# Patient Record
Sex: MALE | Race: BLACK OR AFRICAN AMERICAN | Employment: FULL TIME | URBAN - METROPOLITAN AREA
[De-identification: names, ages, dates, MRNs, and addresses within clinical notes are randomized per-mention and may not be internally consistent; named-entity substitution may affect disease eponyms.]

---

## 2017-01-16 RX ORDER — SILDENAFIL CITRATE 100 MG/1
TABLET, FILM COATED ORAL
Qty: 9 TAB | Refills: 0 | Status: SHIPPED | OUTPATIENT
Start: 2017-01-16 | End: 2017-02-17 | Stop reason: SDUPTHER

## 2017-02-19 RX ORDER — SILDENAFIL CITRATE 100 MG/1
TABLET, FILM COATED ORAL
Qty: 9 TAB | Refills: 0 | Status: SHIPPED | OUTPATIENT
Start: 2017-02-19 | End: 2017-09-14 | Stop reason: SDUPTHER

## 2017-06-06 ENCOUNTER — OFFICE VISIT (OUTPATIENT)
Dept: INTERNAL MEDICINE CLINIC | Age: 47
End: 2017-06-06

## 2017-06-06 VITALS
BODY MASS INDEX: 39.17 KG/M2 | HEIGHT: 75 IN | RESPIRATION RATE: 18 BRPM | SYSTOLIC BLOOD PRESSURE: 127 MMHG | DIASTOLIC BLOOD PRESSURE: 82 MMHG | WEIGHT: 315 LBS | OXYGEN SATURATION: 96 % | HEART RATE: 83 BPM | TEMPERATURE: 97.3 F

## 2017-06-06 DIAGNOSIS — E78.5 HYPERLIPIDEMIA, UNSPECIFIED HYPERLIPIDEMIA TYPE: ICD-10-CM

## 2017-06-06 DIAGNOSIS — G47.30 SLEEP APNEA, UNSPECIFIED TYPE: ICD-10-CM

## 2017-06-06 DIAGNOSIS — Z79.4 UNCONTROLLED TYPE 2 DIABETES MELLITUS WITH HYPERGLYCEMIA, WITH LONG-TERM CURRENT USE OF INSULIN (HCC): Primary | ICD-10-CM

## 2017-06-06 DIAGNOSIS — E11.65 UNCONTROLLED TYPE 2 DIABETES MELLITUS WITH HYPERGLYCEMIA, WITH LONG-TERM CURRENT USE OF INSULIN (HCC): Primary | ICD-10-CM

## 2017-06-06 LAB
ALBUMIN UR QL STRIP: 150 MG/L
CHOLEST SERPL-MCNC: 200 MG/DL
CREATININE, URINE POC: 300 MG/DL
GLUCOSE POC: 188 MG/DL
HBA1C MFR BLD HPLC: 11.3 %
HDLC SERPL-MCNC: 47 MG/DL
LDL CHOLESTEROL POC: 132
MICROALBUMIN/CREAT RATIO POC: NORMAL MG/G
NON-HDL GOAL (POC): 153
TCHOL/HDL RATIO (POC): 2.8
TRIGL SERPL-MCNC: 109 MG/DL

## 2017-06-06 RX ORDER — ROSUVASTATIN CALCIUM 40 MG/1
40 TABLET, COATED ORAL
Qty: 30 TAB | Refills: 11 | Status: SHIPPED | OUTPATIENT
Start: 2017-06-06 | End: 2017-09-11 | Stop reason: SDUPTHER

## 2017-06-06 NOTE — PROGRESS NOTES
Subjective: (As above and below)     Chief Complaint   Patient presents with    Complete Physical    Foot Swelling     At night while sleeping     Moy Rojas is a 52y.o. year old male who presents for annual exam and DM2 check up. Diabetic Review of Systems - medication compliance: noncompliant much of the time, diabetic diet compliance: noncompliant much of the time, home glucose monitoring: is performed sporadically, last eye exam approximately 1 ago. He states that he checks his blood sugar occasionally and it is in the 120's. He admits that he has NOT been taking his insulin. He states that he takes the levemir approx every other day and takes the novolog twice weekly. He admits that he frequently snacks - his fiance buys the groceries and there is always junk food around. He is not physically active. He works at [employer] and is around a lot of snacks at work as well. He continues to smoke. Last eye exam approx 1 year ago. Reviewed PmHx, RxHx, FmHx, SocHx, AllgHx and updated in chart.   Family History   Problem Relation Age of Onset    Diabetes Mother      cancer, liver? hep c    Hypertension Mother     Hypertension Father     Diabetes Brother     Hypertension Brother     Hypertension Maternal Uncle     Diabetes Maternal Grandmother     Diabetes Paternal Grandmother     Hypertension Maternal Uncle     Hypertension Maternal Uncle     Diabetes Paternal Uncle        Past Medical History:   Diagnosis Date    Asthma     Diabetes ([address]     HTN (hypertension) 3/30/2010    Hypertension     Other and unspecified hyperlipidemia 3/30/2010    Sleep apnea     Type II or unspecified type diabetes mellitus without mention of complication, uncontrolled 3/30/2010      Social History     Social History    Marital status: SINGLE     Spouse name: N/A    Number of children: N/A    Years of education: N/A     Social History Main Topics    Smoking status: Current Some Day Smoker Packs/day: 1.00     Years: 15.00     Types: Cigars    Smokeless tobacco: Never Used      Comment: black and mild    Alcohol use 6.0 oz/week     10 Standard drinks or equivalent per week      Comment: rare    Drug use: No    Sexual activity: Yes     Partners: Female     Birth control/ protection: Condom     Other Topics Concern    None     Social History Narrative    6/6/17: works at [employer]          Current Outpatient Prescriptions   Medication Sig    rosuvastatin (CRESTOR) 40 mg tablet Take 1 Tab by mouth nightly.  VIAGRA 100 mg tablet TAKE 1 TABLET BY MOUTH AS NEEDED FOR SEX AS DIRECTED    albuterol (PROVENTIL HFA, VENTOLIN HFA, PROAIR HFA) 90 mcg/actuation inhaler Take 1-2 Puffs by inhalation every four (4) hours as needed for Wheezing.  enalapril-hydrochlorthiazide (VASERETIC) 10-25 mg tablet Take 2 Tabs by mouth daily.  insulin detemir (LEVEMIR FLEXTOUCH) 100 unit/mL (3 mL) inpn Inject 65 units once daily.  sitaGLIPtin-metFORMIN (JANUMET) 50-1,000 mg per tablet Take 1 Tab by mouth two (2) times daily (with meals).  insulin aspart (NOVOLOG) 100 unit/mL inpn 15 units before meals  Indications: TYPE 2 DIABETES MELLITUS    Insulin Needles, Disposable, 31 gauge x 5/16\" ndle Use with insulin 4 times a day    aspirin delayed-release 81 mg tablet Take 1 Tab by mouth daily.  amLODIPine (NORVASC) 5 mg tablet Take 1 Tab by mouth daily. No current facility-administered medications for this visit. Review of Systems:   Constitutional:    Negative for fever and chills, negative diaphoresis. HEENT:              Negative for neck pain and stiffness. Eyes:                  Negative for visual disturbance, itching, redness or discharge. Respiratory:        Negative for cough and shortness of breath. Cardiovascular:  Negative for chest pain and palpitations. Gastrointestinal: Negative for nausea, vomiting, abdominal pain, diarrhea or constipation.   Genitourinary:     Negative for dysuria and frequency. Musculoskeletal: Negative for falls, tenderness and swelling. Skin:                    Negative for rash, masses or lesions. Neurological:       Negative for dizzyness, seizure, loss of consciousness, weakness and numbness.      Objective:     Vitals:    06/06/17 0817   BP: 127/82   Pulse: 83   Resp: 18   Temp: 97.3 °F (36.3 °C)   TempSrc: Oral   SpO2: 96%   Weight: (!) 350 lb 14.4 oz (159.2 kg)   Height: 6' 3\" (1.905 m)       Results for orders placed or performed in visit on 06/06/17   AMB POC GLUCOSE BLOOD, BY GLUCOSE MONITORING DEVICE   Result Value Ref Range    Glucose  mg/dL   AMB POC HEMOGLOBIN A1C   Result Value Ref Range    Hemoglobin A1c (POC) 11.3 %   AMB POC URINE, MICROALBUMIN, SEMIQUANT (3 RESULTS)   Result Value Ref Range    ALBUMIN, URINE  Negative mg/L    CREATININE, URINE  mg/dL    Microalbumin/creat ratio (POC) 30-300mg/g mg/g   AMB POC LIPID PROFILE   Result Value Ref Range    Cholesterol (POC) 200     Triglycerides (POC) 109     HDL Cholesterol (POC) 47     LDL Cholesterol (POC) 132     Non-HDL Goal (POC) 153     TChol/HDL Ratio (POC) 2.8          Physical Examination: General appearance - alert, well appearing, and in no distress  Mental status - alert, oriented to person, place, and time  Ears - bilateral TM's and external ear canals normal  Neck - supple, no significant adenopathy  Chest - clear to auscultation, no wheezes, rales or rhonchi, symmetric air entry  Heart - normal rate, regular rhythm, normal S1, S2, no murmurs, rubs, clicks or gallops  Extremities - peripheral pulses normal, no pedal edema, no clubbing or cyanosis      Diabetic foot exam:     Left:    Vibratory sensation normal    Proprioception normal   Sharp/dull discrimination normal    Filament test normal sensation with micro filament   Pulse DP: 2+ (normal)   Pulse PT: 2+ (normal)   Deformities: dry skin  Right:    Vibratory sensation normal   Proprioception normal   Sharp/dull discrimination normal   Filament test normal sensation with micro filament   Pulse DP: 2+ (normal)   Pulse PT: 2+ (normal)   Deformities: Mild - dry skin    Assessment/ Plan:   Follow-up Disposition:  Return in about 3 months (around 9/6/2017). NP student reviewed foot care basics with patient. Ting Jones discussion about need to take meds and improve lifestyle - discussed complications of DM2. [facility] endo, DM ed    1. Uncontrolled type 2 diabetes mellitus with hyperglycemia, with long-term current use of insulin ([facility])    - AMB POC GLUCOSE BLOOD, BY GLUCOSE MONITORING DEVICE  - AMB POC HEMOGLOBIN A1C  - AMB POC URINE, MICROALBUMIN, SEMIQUANT (3 RESULTS)  - AMB POC LIPID PROFILE  -  DIABETES FOOT EXAM  - REFERRAL TO DIABETIC EDUCATION  - REFERRAL TO ENDOCRINOLOGY  - METABOLIC PANEL, BASIC    2. Hyperlipidemia, unspecified hyperlipidemia type  increase  - rosuvastatin (CRESTOR) 40 mg tablet; Take 1 Tab by mouth nightly. Dispense: 30 Tab; Refill: 11    3. Sleep apnea, unspecified type  Routine follow up  - REFERRAL TO SLEEP STUDIES        I have discussed the diagnosis with the patient and the intended plan as seen in the above orders. The patient has received an after-visit summary and questions were answered concerning future plans. Pt conveyed understanding of plan. Medication Side Effects and Warnings were discussed with patient: yes  Patient Labs were reviewed: yes  Patient Past Records were reviewed:  yes    Aida Jung.  Diana Officer, NP

## 2017-06-06 NOTE — MR AVS SNAPSHOT
Visit Information Date & Time Provider Department Dept. Phone Encounter #  
 6/6/2017  8:15 AM Lesa Remy, [address] [id number] Follow-up Instructions Return in about 3 months (around 9/6/2017). Upcoming Health Maintenance Date Due  
 FOOT EXAM Q1 5/6/1980 Pneumococcal 19-64 Medium Risk (1 of 1 - PPSV23) 5/6/1989 DTaP/Tdap/Td series (1 - Tdap) 5/6/1991 MICROALBUMIN Q1 3/5/2015 EYE EXAM RETINAL OR DILATED Q1 2/10/2016 HEMOGLOBIN A1C Q6M 12/15/2016 LIPID PANEL Q1 6/15/2017 INFLUENZA AGE 9 TO ADULT 8/1/2017 Allergies as of 6/6/2017  Review Complete On: 6/6/2017 By: Keyanna Goel LPN Severity Noted Reaction Type Reactions Diazepam  08/17/2016    Other (comments) Caused hallucinations, paranoia Shellfish Derived  05/10/2015    Angioedema Current Immunizations  Never Reviewed No immunizations on file. Not reviewed this visit You Were Diagnosed With   
  
 Codes Comments Uncontrolled type 2 diabetes mellitus with hyperglycemia, with long-term current use of insulin (HCC)    -  Primary ICD-10-CM: E11.65, Z79.4 ICD-9-CM: 250.02, V58.67 Hyperlipidemia, unspecified hyperlipidemia type     ICD-10-CM: E78.5 ICD-9-CM: 272.4 Sleep apnea, unspecified type     ICD-10-CM: G47.30 ICD-9-CM: 780.57 Vitals BP Pulse Temp Resp Height(growth percentile) Weight(growth percentile) 127/82 (BP 1 Location: Right arm, BP Patient Position: Sitting) 83 97.3 °F (36.3 °C) (Oral) 18 6' 3\" (1.905 m) (!) 350 lb 14.4 oz (159.2 kg) SpO2 BMI Smoking Status 96% 43.86 kg/m2 Current Some Day Smoker BMI and BSA Data Body Mass Index Body Surface Area  
 43.86 kg/m 2 2.9 m 2 Preferred Pharmacy Pharmacy Name Phone [facility] [address] [employer] [address] [place] [address]  [place] [phone] Your Updated Medication List  
  
   
This list is accurate as of: 6/6/17  9:05 AM.  Always use your most recent med list.  
  
  
  
  
 albuterol 90 mcg/actuation inhaler Commonly known as:  PROVENTIL HFA, VENTOLIN HFA, PROAIR HFA Take 1-2 Puffs by inhalation every four (4) hours as needed for Wheezing. amLODIPine 5 mg tablet Commonly known as:  [place]radle Take 1 Tab by mouth daily. aspirin delayed-release 81 mg tablet Take 1 Tab by mouth daily. enalapril-hydroCHLOROthiazide 10-25 mg tablet Commonly known as:  Espinoza Chiquito Take 2 Tabs by mouth daily. insulin aspart 100 unit/mL Inpn Commonly known as:  Kenyatta Fus 15 units before meals  Indications: TYPE 2 DIABETES MELLITUS  
  
 insulin detemir 100 unit/mL (3 mL) Inpn Commonly known as:  Cruzito Caballero Inject 65 units once daily. Insulin Needles (Disposable) 31 gauge x 5/16\" Ndle Use with insulin 4 times a day  
  
 rosuvastatin 40 mg tablet Commonly known as:  CRESTOR Take 1 Tab by mouth nightly. SITagliptin-metFORMIN 50-1,000 mg per tablet Commonly known as:  Sherol Yulissa Take 1 Tab by mouth two (2) times daily (with meals). VIAGRA 100 mg tablet Generic drug:  sildenafil citrate TAKE 1 TABLET BY MOUTH AS NEEDED FOR SEX AS DIRECTED Prescriptions Sent to Pharmacy Refills  
 rosuvastatin (CRESTOR) 40 mg tablet 11 Sig: Take 1 Tab by mouth nightly. Class: Normal  
 Pharmacy: [facility] [address] Ph #: [phone] Route: Oral  
  
We Performed the Following AMB POC GLUCOSE BLOOD, BY GLUCOSE MONITORING DEVICE [75723 CPT(R)] AMB POC HEMOGLOBIN A1C [28634 CPT(R)] AMB POC LIPID PROFILE [34231 CPT(R)] AMB POC URINE, MICROALBUMIN, SEMIQUANT (3 RESULTS) [69731 CPT(R)]  DIABETES FOOT EXAM [7 Custom] METABOLIC PANEL, BASIC [40907 CPT(R)] REFERRAL TO DIABETIC EDUCATION [REF20 Custom] Comments:  
 Please evaluate patient for DM2 Please schedule and authorize patient for services monthly REFERRAL TO ENDOCRINOLOGY [WWG82 Custom] REFERRAL TO SLEEP STUDIES [REF99 Custom] Comments:  
 Please evaluate patient for general follow up for sleep apnea Follow-up Instructions Return in about 3 months (around 9/6/2017). Referral Information Referral ID Referred By Referred To [id number] Torsten GIMENEZ Not Available Visits Status Start Date End Date 1 New Request 6/6/17 6/6/18 If your referral has a status of pending review or denied, additional information will be sent to support the outcome of this decision. Referral ID Referred By Referred To [id number] Torsten Tanner MD  
   [address] [place] [address] [place] [address] Phone: [phone] Fax: [phone] Visits Status Start Date End Date 1 New Request 6/6/17 6/6/18 If your referral has a status of pending review or denied, additional information will be sent to support the outcome of this decision. Referral ID Referred By Referred To [id number] Torsten Lowe [address] [address] [address] Phone: [phone] Fax: [phone] Visits Status Start Date End Date 1 New Request 6/6/17 6/6/18 If your referral has a status of pending review or denied, additional information will be sent to support the outcome of this decision. Patient Instructions 1. You diabetes is very poorly controlled - you MUST take your medication as prescribed - your kidneys are already showing some changes from diabetes 2. At this point, I would like you to set up with an endocrinologist - a diabetes specialist - please call to make this appointment. 3. If you are due for you eye exam, please schedule this 4. The diabetic education class will be calling you - it would be nice if you can arrange to have some classes with them 5. Follow up with sleep studies for sleep apnea 6. Your blood pressure is great today! 7. We are going to increase your cholesterol medication crestor to 40mg nightly - the refills will be 40mg tabs but you can take 2 of the 20mg tabs until you run out Learning About Meal Planning for Diabetes Why plan your meals? Meal planning can be a key part of managing diabetes. Planning meals and snacks with the right balance of carbohydrate, protein, and fat can help you keep your blood sugar at the target level you set with your doctor. You don't have to eat special foods. You can eat what your family eats, including sweets once in a while. But you do have to pay attention to how often you eat and how much you eat of certain foods. You may want to work with a dietitian or a certified diabetes educator. He or she can give you tips and meal ideas and can answer your questions about meal planning. This health professional can also help you reach a healthy weight if that is one of your goals. What plan is right for you? Your dietitian or diabetes educator may suggest that you start with the plate format or carbohydrate counting. The plate format The plate format is a simple way to help you manage how you eat. You plan meals by learning how much space each food should take on a plate. Using the plate format helps you spread carbohydrate throughout the day. It can make it easier to keep your blood sugar level within your target range. It also helps you see if you're eating healthy portion sizes. To use the plate format, you put non-starchy vegetables on half your plate. Add meat or meat substitutes on one-quarter of the plate. Put a grain or starchy vegetable (such as brown rice or a potato) on the final quarter of the plate.  You can add a small piece of fruit and some low-fat or fat-free milk or yogurt, depending on your carbohydrate goal for each meal. 
Here are some tips for using the plate format: · Make sure that you are not using an oversized plate. A 9-inch plate is best. Many restaurants use larger plates. · Get used to using the plate format at home. Then you can use it when you eat out. · Write down your questions about using the plate format. Talk to your doctor, a dietitian, or a diabetes educator about your concerns. Carbohydrate counting With carbohydrate counting, you plan meals based on the amount of carbohydrate in each food. Carbohydrate raises blood sugar higher and more quickly than any other nutrient. It is found in desserts, breads and cereals, and fruit. It's also found in starchy vegetables such as potatoes and corn, grains such as rice and pasta, and milk and yogurt. Spreading carbohydrate throughout the day helps keep your blood sugar levels within your target range. Your daily amount depends on several things, including your weight, how active you are, which diabetes medicines you take, and what your goals are for your blood sugar levels. A registered dietitian or diabetes educator can help you plan how much carbohydrate to include in each meal and snack. A guideline for your daily amount of carbohydrate is: · 45 to 60 grams at each meal. That's about the same as 3 to 4 carbohydrate servings. · 15 to 20 grams at each snack. That's about the same as 1 carbohydrate serving. The Nutrition Facts label on packaged foods tells you how much carbohydrate is in a serving of the food. First, look at the serving size on the food label. Is that the amount you eat in a serving? All of the nutrition information on a food label is based on that serving size. So if you eat more or less than that, you'll need to adjust the other numbers. Total carbohydrate is the next thing you need to look for on the label.  If you count carbohydrate servings, one serving of carbohydrate is 15 grams. For foods that don't come with labels, such as fresh fruits and vegetables, you'll need a guide that lists carbohydrate in these foods. Ask your doctor, dietitian, or diabetes educator about books or other nutrition guides you can use. If you take insulin, you need to know how many grams of carbohydrate are in a meal. This lets you know how much rapid-acting insulin to take before you eat. If you use an insulin pump, you get a constant rate of insulin during the day. So the pump must be programmed at meals to give you extra insulin to cover the rise in blood sugar after meals. When you know how much carbohydrate you will eat, you can take the right amount of insulin. Or, if you always use the same amount of insulin, you need to make sure that you eat the same amount of carbohydrate at meals. If you need more help to understand carbohydrate counting and food labels, ask your doctor, dietitian, or diabetes educator. How do you get started with meal planning? Here are some tips to get started: 
· Plan your meals a week at a time. Don't forget to include snacks too. · Use cookbooks or online recipes to plan several main meals. Plan some quick meals for busy nights. You also can double some recipes that freeze well. Then you can save half for other busy nights when you don't have time to cook. · Make sure you have the ingredients you need for your recipes. If you're running low on basic items, put these items on your shopping list too. · List foods that you use to make breakfasts, lunches, and snacks. List plenty of fruits and vegetables. · Post this list on the refrigerator. Add to it as you think of more things you need. · Take the list to the store to do your weekly shopping. Follow-up care is a key part of your treatment and safety.  Be sure to make and go to all appointments, and call your doctor if you are having problems. It's also a good idea to know your test results and keep a list of the medicines you take. Where can you learn more? Go to http://kimo-cherry.info/. Priya Cramer in the search box to learn more about \"Learning About Meal Planning for Diabetes. \" Current as of: October 26, 2016 Content Version: 11.2 © 6908-7932 [employer]. Care instructions adapted under license by [employer] (which disclaims liability or warranty for this information). If you have questions about a medical condition or this instruction, always ask your healthcare professional. [place] [address] any warranty or liability for your use of this information. Diabetes Foot Health: Care Instructions Your Care Instructions When you have diabetes, your feet need extra care and attention. Diabetes can damage the nerve endings and blood vessels in your feet, making you less likely to notice when your feet are injured. Diabetes also limits your body's ability to fight infection and get blood to areas that need it. If you get a minor foot injury, it could become an ulcer or a serious infection. With good foot care, you can prevent most of these problems. Caring for your feet can be quick and easy. Most of the care can be done when you are bathing or getting ready for bed. Follow-up care is a key part of your treatment and safety. Be sure to make and go to all appointments, and call your doctor if you are having problems. Its also a good idea to know your test results and keep a list of the medicines you take. How can you care for yourself at home? · Keep your blood sugar close to normal by watching what and how much you eat, monitoring blood sugar, taking medicines if prescribed, and getting regular exercise. · Do not smoke. Smoking affects blood flow and can make foot problems worse.  If you need help quitting, talk to your doctor about stop-smoking programs and medicines. These can increase your chances of quitting for good. · Eat a diet that is low in fats. High fat intake can cause fat to build up in your blood vessels and decrease blood flow. · Inspect your feet daily for blisters, cuts, cracks, or sores. If you cannot see well, use a mirror or have someone help you. · Take care of your feet: 
[facility] AUTHORITY your feet every day. Use warm (not hot) water. Check the water temperature with your wrists or other part of your body, not your feet. ¨ Dry your feet well. Pat them dry. Do not rub the skin on your feet too hard. Dry well between your toes. If the skin on your feet stays moist, bacteria or a fungus can grow, which can lead to infection. ¨ Keep your skin soft. Use moisturizing skin cream to keep the skin on your feet soft and prevent calluses and cracks. But do not put the cream between your toes, and stop using any cream that causes a rash. ¨ Clean underneath your toenails carefully. Do not use a sharp object to clean underneath your toenails. Use the blunt end of a nail file or other rounded tool. ¨ Trim and file your toenails straight across to prevent ingrown toenails. Use a nail clipper, not scissors. Use an emery board to smooth the edges. · Change socks daily. Socks without seams are best, because seams often rub the feet. You can find socks for people with diabetes from specialty catalogs. · Look inside your shoes every day for things like gravel or torn linings, which could cause blisters or sores. · Buy shoes that fit well: 
¨ Look for shoes that have plenty of space around the toes. This helps prevent bunions and blisters. ¨ Try on shoes while wearing the kind of socks you will usually wear with the shoes. ¨ Avoid plastic shoes. They may rub your feet and cause blisters. Good shoes should be made of materials that are flexible and breathable, such as leather or cloth. ¨ Break in new shoes slowly by wearing them for no more than an hour a day for several days. Take extra time to check your feet for red areas, blisters, or other problems after you wear new shoes. · Do not go barefoot. Do not wear sandals, and do not wear shoes with very thin soles. Thin soles are easy to puncture. They also do not protect your feet from hot pavement or cold weather. · Have your doctor check your feet during each visit. If you have a foot problem, see your doctor. Do not try to treat an early foot problem at home. Home remedies or treatments that you can buy without a prescription (such as corn removers) can be harmful. · Always get early treatment for foot problems. A minor irritation can lead to a major problem if not properly cared for early. When should you call for help? Call your doctor now or seek immediate medical care if: 
· You have a foot sore, an ulcer or break in the skin that is not healing after 4 days, bleeding corns or calluses, or an ingrown toenail. · You have blue or black areas, which can mean bruising or blood flow problems. · You have peeling skin or tiny blisters between your toes or cracking or oozing of the skin. · You have a fever for more than 24 hours and a foot sore. · You have new numbness or tingling in your feet that does not go away after you move your feet or change positions. · You have unexplained or unusual swelling of the foot or ankle. Watch closely for changes in your health, and be sure to contact your doctor if: 
· You cannot do proper foot care. Where can you learn more? Go to http://kimo-cherry.info/. Enter A739 in the search box to learn more about \"Diabetes Foot Health: Care Instructions. \" Current as of: May 23, 2016 Content Version: 11.2 © 6590-6279 [employer].  Care instructions adapted under license by [employer] (which disclaims liability or warranty for this information). If you have questions about a medical condition or this instruction, always ask your healthcare professional. [address] any warranty or liability for your use of this information. Introducing [facility] HEALTH SERVICES! [facility] introduces The Society patient portal. Now you can access parts of your medical record, email your doctor's office, and request medication refills online. 1. In your internet browser, go to [email or web address] 2. Click on the First Time User? Click Here link in the Sign In box. You will see the New Member Sign Up page. 3. Enter your The Society Access Code exactly as it appears below. You will not need to use this code after youve completed the sign-up process. If you do not sign up before the expiration date, you must request a new code. · The Society Access Code: [email or web address] Expires: 9/4/2017  8:07 AM 
 
4. Enter the last four digits of your Social Security Number (xxxx) and Date of Birth (mm/dd/yyyy) as indicated and click Submit. You will be taken to the next sign-up page. 5. Create a The Society ID. This will be your The Society login ID and cannot be changed, so think of one that is secure and easy to remember. 6. Create a The Society password. You can change your password at any time. 7. Enter your Password Reset Question and Answer. This can be used at a later time if you forget your password. 8. Enter your e-mail address. You will receive e-mail notification when new information is available in [address] 9. Click Sign Up. You can now view and download portions of your medical record. 10. Click the Download Summary menu link to download a portable copy of your medical information. If you have questions, please visit the Frequently Asked Questions section of the The Society website. Remember, The Society is NOT to be used for urgent needs. For medical emergencies, dial 911. Now available from your iPhone and Android! Please provide this summary of care documentation to your next provider. Your primary care clinician is listed as Rodolfo Vasquez. If you have any questions after today's visit, please call [phone].

## 2017-06-06 NOTE — PROGRESS NOTES
Pt here for   Chief Complaint   Patient presents with    Complete Physical    Foot Swelling     At night while sleeping     1. Have you been to the ER, urgent care clinic since your last visit? Hospitalized since your last visit? No    2. Have you seen or consulted any other health care providers outside of the [address] [place] [address] since your last visit? Include any pap smears or colon screening.  no     Pt denies pain at this time

## 2017-06-06 NOTE — PATIENT INSTRUCTIONS
1. You diabetes is very poorly controlled - you MUST take your medication as prescribed - your kidneys are already showing some changes from diabetes    2. At this point, I would like you to set up with an endocrinologist - a diabetes specialist - please call to make this appointment. 3. If you are due for you eye exam, please schedule this    4. The diabetic education class will be calling you - it would be nice if you can arrange to have some classes with them    5. Follow up with sleep studies for sleep apnea    6. Your blood pressure is great today! 7. We are going to increase your cholesterol medication crestor to 40mg nightly - the refills will be 40mg tabs but you can take 2 of the 20mg tabs until you run out         Learning About Meal Planning for Diabetes  Why plan your meals? Meal planning can be a key part of managing diabetes. Planning meals and snacks with the right balance of carbohydrate, protein, and fat can help you keep your blood sugar at the target level you set with your doctor. You don't have to eat special foods. You can eat what your family eats, including sweets once in a while. But you do have to pay attention to how often you eat and how much you eat of certain foods. You may want to work with a dietitian or a certified diabetes educator. He or she can give you tips and meal ideas and can answer your questions about meal planning. This health professional can also help you reach a healthy weight if that is one of your goals. What plan is right for you? Your dietitian or diabetes educator may suggest that you start with the plate format or carbohydrate counting. The plate format  The plate format is a simple way to help you manage how you eat. You plan meals by learning how much space each food should take on a plate. Using the plate format helps you spread carbohydrate throughout the day. It can make it easier to keep your blood sugar level within your target range.  It also helps you see if you're eating healthy portion sizes. To use the plate format, you put non-starchy vegetables on half your plate. Add meat or meat substitutes on one-quarter of the plate. Put a grain or starchy vegetable (such as brown rice or a potato) on the final quarter of the plate. You can add a small piece of fruit and some low-fat or fat-free milk or yogurt, depending on your carbohydrate goal for each meal.  Here are some tips for using the plate format:  · Make sure that you are not using an oversized plate. A 9-inch plate is best. Many restaurants use larger plates. · Get used to using the plate format at home. Then you can use it when you eat out. · Write down your questions about using the plate format. Talk to your doctor, a dietitian, or a diabetes educator about your concerns. Carbohydrate counting  With carbohydrate counting, you plan meals based on the amount of carbohydrate in each food. Carbohydrate raises blood sugar higher and more quickly than any other nutrient. It is found in desserts, breads and cereals, and fruit. It's also found in starchy vegetables such as potatoes and corn, grains such as rice and pasta, and milk and yogurt. Spreading carbohydrate throughout the day helps keep your blood sugar levels within your target range. Your daily amount depends on several things, including your weight, how active you are, which diabetes medicines you take, and what your goals are for your blood sugar levels. A registered dietitian or diabetes educator can help you plan how much carbohydrate to include in each meal and snack. A guideline for your daily amount of carbohydrate is:  · 45 to 60 grams at each meal. That's about the same as 3 to 4 carbohydrate servings. · 15 to 20 grams at each snack. That's about the same as 1 carbohydrate serving. The Nutrition Facts label on packaged foods tells you how much carbohydrate is in a serving of the food.  First, look at the serving size on the food label. Is that the amount you eat in a serving? All of the nutrition information on a food label is based on that serving size. So if you eat more or less than that, you'll need to adjust the other numbers. Total carbohydrate is the next thing you need to look for on the label. If you count carbohydrate servings, one serving of carbohydrate is 15 grams. For foods that don't come with labels, such as fresh fruits and vegetables, you'll need a guide that lists carbohydrate in these foods. Ask your doctor, dietitian, or diabetes educator about books or other nutrition guides you can use. If you take insulin, you need to know how many grams of carbohydrate are in a meal. This lets you know how much rapid-acting insulin to take before you eat. If you use an insulin pump, you get a constant rate of insulin during the day. So the pump must be programmed at meals to give you extra insulin to cover the rise in blood sugar after meals. When you know how much carbohydrate you will eat, you can take the right amount of insulin. Or, if you always use the same amount of insulin, you need to make sure that you eat the same amount of carbohydrate at meals. If you need more help to understand carbohydrate counting and food labels, ask your doctor, dietitian, or diabetes educator. How do you get started with meal planning? Here are some tips to get started:  · Plan your meals a week at a time. Don't forget to include snacks too. · Use cookbooks or online recipes to plan several main meals. Plan some quick meals for busy nights. You also can double some recipes that freeze well. Then you can save half for other busy nights when you don't have time to cook. · Make sure you have the ingredients you need for your recipes. If you're running low on basic items, put these items on your shopping list too. · List foods that you use to make breakfasts, lunches, and snacks. List plenty of fruits and vegetables.   · Post this list on the refrigerator. Add to it as you think of more things you need. · Take the list to the store to do your weekly shopping. Follow-up care is a key part of your treatment and safety. Be sure to make and go to all appointments, and call your doctor if you are having problems. It's also a good idea to know your test results and keep a list of the medicines you take. Where can you learn more? Go to http://kmio-cherry.info/. Virginia Roman in the search box to learn more about \"Learning About Meal Planning for Diabetes. \"  Current as of: October 26, 2016  Content Version: 11.2  © 0661-1888 [employer]. Care instructions adapted under license by [employer] (which disclaims liability or warranty for this information). If you have questions about a medical condition or this instruction, always ask your healthcare professional. [place] [address] any warranty or liability for your use of this information. Diabetes Foot Health: Care Instructions  Your Care Instructions    When you have diabetes, your feet need extra care and attention. Diabetes can damage the nerve endings and blood vessels in your feet, making you less likely to notice when your feet are injured. Diabetes also limits your body's ability to fight infection and get blood to areas that need it. If you get a minor foot injury, it could become an ulcer or a serious infection. With good foot care, you can prevent most of these problems. Caring for your feet can be quick and easy. Most of the care can be done when you are bathing or getting ready for bed. Follow-up care is a key part of your treatment and safety. Be sure to make and go to all appointments, and call your doctor if you are having problems. Its also a good idea to know your test results and keep a list of the medicines you take. How can you care for yourself at home?   · Keep your blood sugar close to normal by watching what and how much you eat, monitoring blood sugar, taking medicines if prescribed, and getting regular exercise. · Do not smoke. Smoking affects blood flow and can make foot problems worse. If you need help quitting, talk to your doctor about stop-smoking programs and medicines. These can increase your chances of quitting for good. · Eat a diet that is low in fats. High fat intake can cause fat to build up in your blood vessels and decrease blood flow. · Inspect your feet daily for blisters, cuts, cracks, or sores. If you cannot see well, use a mirror or have someone help you. · Take care of your feet:  [facility] your feet every day. Use warm (not hot) water. Check the water temperature with your wrists or other part of your body, not your feet. ¨ Dry your feet well. Pat them dry. Do not rub the skin on your feet too hard. Dry well between your toes. If the skin on your feet stays moist, bacteria or a fungus can grow, which can lead to infection. ¨ Keep your skin soft. Use moisturizing skin cream to keep the skin on your feet soft and prevent calluses and cracks. But do not put the cream between your toes, and stop using any cream that causes a rash. ¨ Clean underneath your toenails carefully. Do not use a sharp object to clean underneath your toenails. Use the blunt end of a nail file or other rounded tool. ¨ Trim and file your toenails straight across to prevent ingrown toenails. Use a nail clipper, not scissors. Use an emery board to smooth the edges. · Change socks daily. Socks without seams are best, because seams often rub the feet. You can find socks for people with diabetes from specialty catalogs. · Look inside your shoes every day for things like gravel or torn linings, which could cause blisters or sores. · Buy shoes that fit well:  ¨ Look for shoes that have plenty of space around the toes. This helps prevent bunions and blisters.   ¨ Try on shoes while wearing the kind of socks you will usually wear with the shoes. ¨ Avoid plastic shoes. They may rub your feet and cause blisters. Good shoes should be made of materials that are flexible and breathable, such as leather or cloth. ¨ Break in new shoes slowly by wearing them for no more than an hour a day for several days. Take extra time to check your feet for red areas, blisters, or other problems after you wear new shoes. · Do not go barefoot. Do not wear sandals, and do not wear shoes with very thin soles. Thin soles are easy to puncture. They also do not protect your feet from hot pavement or cold weather. · Have your doctor check your feet during each visit. If you have a foot problem, see your doctor. Do not try to treat an early foot problem at home. Home remedies or treatments that you can buy without a prescription (such as corn removers) can be harmful. · Always get early treatment for foot problems. A minor irritation can lead to a major problem if not properly cared for early. When should you call for help? Call your doctor now or seek immediate medical care if:  · You have a foot sore, an ulcer or break in the skin that is not healing after 4 days, bleeding corns or calluses, or an ingrown toenail. · You have blue or black areas, which can mean bruising or blood flow problems. · You have peeling skin or tiny blisters between your toes or cracking or oozing of the skin. · You have a fever for more than 24 hours and a foot sore. · You have new numbness or tingling in your feet that does not go away after you move your feet or change positions. · You have unexplained or unusual swelling of the foot or ankle. Watch closely for changes in your health, and be sure to contact your doctor if:  · You cannot do proper foot care. Where can you learn more? Go to http://kimo-cherry.info/. Enter A739 in the search box to learn more about \"Diabetes Foot Health: Care Instructions. \"  Current as of: May 23, 2016  Content Version: 11.2  © 4828-3993 [employer]. Care instructions adapted under license by [employer] (which disclaims liability or warranty for this information). If you have questions about a medical condition or this instruction, always ask your healthcare professional. [address] any warranty or liability for your use of this information.

## 2017-07-27 ENCOUNTER — TELEPHONE (OUTPATIENT)
Dept: INTERNAL MEDICINE CLINIC | Age: 47
End: 2017-07-27

## 2017-07-27 NOTE — TELEPHONE ENCOUNTER
Pt is calling to get your medication released because they have been delayed due to Dr. Fuad Benson leaving the practice.  Pt is having trouble acquiring his Rs.'s Now and best contact number is [phone].

## 2017-07-28 RX ORDER — INSULIN DETEMIR 100 [IU]/ML
INJECTION, SOLUTION SUBCUTANEOUS
Qty: 15 ML | Refills: 0 | Status: SHIPPED | OUTPATIENT
Start: 2017-07-28 | End: 2017-09-11 | Stop reason: SDUPTHER

## 2017-08-07 ENCOUNTER — TELEPHONE (OUTPATIENT)
Dept: INTERNAL MEDICINE CLINIC | Age: 47
End: 2017-08-07

## 2017-08-07 RX ORDER — ALBUTEROL SULFATE 90 UG/1
1-2 AEROSOL, METERED RESPIRATORY (INHALATION)
Qty: 1 INHALER | Refills: 11 | Status: SHIPPED | OUTPATIENT
Start: 2017-08-07 | End: 2018-10-12 | Stop reason: SDUPTHER

## 2017-08-08 VITALS
TEMPERATURE: 98 F | BODY MASS INDEX: 39.17 KG/M2 | WEIGHT: 315 LBS | OXYGEN SATURATION: 98 % | DIASTOLIC BLOOD PRESSURE: 95 MMHG | HEART RATE: 99 BPM | RESPIRATION RATE: 20 BRPM | HEIGHT: 75 IN | SYSTOLIC BLOOD PRESSURE: 147 MMHG

## 2017-08-08 PROCEDURE — 99282 EMERGENCY DEPT VISIT SF MDM: CPT

## 2017-08-09 ENCOUNTER — HOSPITAL ENCOUNTER (EMERGENCY)
Age: 47
Discharge: HOME | End: 2017-08-09
Attending: EMERGENCY MEDICINE
Payer: COMMERCIAL

## 2017-08-09 ENCOUNTER — APPOINTMENT (OUTPATIENT)
Dept: GENERAL RADIOLOGY | Age: 47
End: 2017-08-09
Attending: EMERGENCY MEDICINE
Payer: COMMERCIAL

## 2017-08-09 DIAGNOSIS — J06.9 ACUTE UPPER RESPIRATORY INFECTION: Primary | ICD-10-CM

## 2017-08-09 DIAGNOSIS — R03.0 ELEVATED BLOOD PRESSURE READING: ICD-10-CM

## 2017-08-09 PROCEDURE — 74011250637 HC RX REV CODE- 250/637: Performed by: PHYSICIAN ASSISTANT

## 2017-08-09 PROCEDURE — 71020 XR CHEST PA LAT: CPT

## 2017-08-09 RX ORDER — HYDROCODONE BITARTRATE AND ACETAMINOPHEN 5; 325 MG/1; MG/1
1 TABLET ORAL
Qty: 20 TAB | Refills: 0 | Status: SHIPPED | OUTPATIENT
Start: 2017-08-09 | End: 2017-09-11

## 2017-08-09 RX ORDER — PREDNISONE 20 MG/1
60 TABLET ORAL
Status: DISCONTINUED | OUTPATIENT
Start: 2017-08-09 | End: 2017-08-09

## 2017-08-09 RX ORDER — NAPROXEN 250 MG/1
500 TABLET ORAL
Status: COMPLETED | OUTPATIENT
Start: 2017-08-09 | End: 2017-08-09

## 2017-08-09 RX ORDER — PREDNISONE 20 MG/1
TABLET ORAL
Status: DISCONTINUED
Start: 2017-08-09 | End: 2017-08-09 | Stop reason: HOSPADM

## 2017-08-09 RX ORDER — NAPROXEN 500 MG/1
500 TABLET ORAL
Qty: 20 TAB | Refills: 0 | Status: SHIPPED | OUTPATIENT
Start: 2017-08-09 | End: 2017-09-11

## 2017-08-09 RX ORDER — BENZONATATE 100 MG/1
200 CAPSULE ORAL
Qty: 30 CAP | Refills: 0 | Status: SHIPPED | OUTPATIENT
Start: 2017-08-09 | End: 2017-08-16

## 2017-08-09 RX ORDER — NAPROXEN 250 MG/1
TABLET ORAL
Status: DISCONTINUED
Start: 2017-08-09 | End: 2017-08-09 | Stop reason: HOSPADM

## 2017-08-09 RX ADMIN — NAPROXEN 500 MG: 250 TABLET ORAL at 01:34

## 2017-08-09 NOTE — ED PROVIDER NOTES
HPI Comments: Lyric Perez is a 52 y.o. male with PMHx of asthma, DM, HTN, and sleep apnea, presenting ambulatory to ED c/o persistent cough that is intermittently productive for the past four weeks. Pt notes his symptoms first began in conjunction with cold symptoms and fever, which resolved but the cough remained. He reports he is having intermittent SOB with the persistent cough. Pt notes symptoms are unchanged with taking OTC cold/cough medications and his inhaler. He reports history of asthma. Pt denies recent long trips or hormone therapy. He denies history of MI or kidney/liver/thyroid disease. Pt reports he is diabetic and notes his BGL has been ~ 120 recently. He notes he currently smokes. Pt specifically denies fever, sore throat, ear pain, or CP. PCP: Toni Dixon MD  Social Hx: current every day smoker (0.5 ppd);  + EtOH (6 oz/week); - drug use. There are no other complaints, changes, or physical findings at this time. Written by SYDNEY Machado, as dictated by Carmen Tipton PA-C. The history is provided by the patient.         Past Medical History:   Diagnosis Date    Asthma     Diabetes ([facility] [address]     HTN (hypertension) 3/30/2010    Hypertension     Other and unspecified hyperlipidemia 3/30/2010    Sleep apnea     Type II or unspecified type diabetes mellitus without mention of complication, uncontrolled 3/30/2010       Past Surgical History:   Procedure Laterality Date    ABDOMEN SURGERY PROC UNLISTED      hernia repair         Family History:   Problem Relation Age of Onset    Diabetes Mother      cancer, liver? hep c    Hypertension Mother     Hypertension Father     Diabetes Brother     Hypertension Brother     Hypertension Maternal Uncle     Diabetes Maternal Grandmother     Diabetes Paternal Grandmother     Hypertension Maternal Uncle     Hypertension Maternal Uncle     Diabetes Paternal Uncle        Social History     Social History    Marital status: SINGLE     Spouse name: N/A    Number of children: N/A    Years of education: N/A     Occupational History    Not on file. Social History Main Topics    Smoking status: Current Every Day Smoker     Packs/day: 0.50     Years: 22.00     Types: Cigars    Smokeless tobacco: Never Used      Comment: black and mild    Alcohol use 6.0 oz/week     10 Standard drinks or equivalent per week      Comment: rare    Drug use: No    Sexual activity: Yes     Partners: Female     Birth control/ protection: Condom     Other Topics Concern    Not on file     Social History Narrative    6/6/17: works at [employer]: D[employer]am and [employer] derived    Review of Systems   Constitutional: Negative. Negative for fever. HENT: Negative. Negative for ear pain and sore throat. Eyes: Negative. Respiratory: Positive for cough and shortness of breath. Cardiovascular: Negative. Negative for chest pain. Gastrointestinal: Negative. Negative for constipation, diarrhea, nausea and vomiting. Denies liver disease   Endocrine:        Denies thyroid disease   Genitourinary: Negative. Negative for dysuria. Denies kidney disease   Musculoskeletal: Negative. Skin: Negative. Neurological: Negative. All other systems reviewed and are negative. Vitals:    08/08/17 2354   BP: (!) 147/95   Pulse: 99   Resp: 20   Temp: 98 °F (36.7 °C)   SpO2: 98%   Weight: (!) 160.9 kg (354 lb 11.5 oz)   Height: 6' 3\" (1.905 m)            Physical Exam   Constitutional: He is oriented to person, place, and time. He appears well-developed and well-nourished. No distress. Overweight;   HENT:   Head: Normocephalic and atraumatic. Right Ear: External ear normal.   Left Ear: External ear normal.   Nose: Nose normal.   Mouth/Throat: Oropharynx is clear and moist. No oropharyngeal exudate. Eyes: Conjunctivae and EOM are normal. Pupils are equal, round, and reactive to light.  Right eye exhibits no discharge. Left eye exhibits no discharge. No scleral icterus. Neck: Normal range of motion. Neck supple. No tracheal deviation present. Cardiovascular: Normal rate, regular rhythm, normal heart sounds and intact distal pulses. Exam reveals no gallop and no friction rub. No murmur heard. Pulmonary/Chest: Effort normal and breath sounds normal. No respiratory distress. He has no wheezes. He has no rales. He exhibits no tenderness. Slight dry cough on exam;   Abdominal: Soft. Bowel sounds are normal. He exhibits no distension and no mass. There is no tenderness. There is no rebound and no guarding. Musculoskeletal: He exhibits no edema or tenderness. Lymphadenopathy:     He has no cervical adenopathy. Neurological: He is alert and oriented to person, place, and time. No cranial nerve deficit. Coordination normal.   Skin: Skin is warm and dry. No rash noted. No erythema. Psychiatric: He has a normal mood and affect. His behavior is normal. Judgment and thought content normal.   Nursing note and vitals reviewed. MDM  Number of Diagnoses or Management Options  Acute upper respiratory infection:   Elevated blood pressure reading:   Diagnosis management comments: DDx: PNA, URI. Amount and/or Complexity of Data Reviewed  Tests in the radiology section of CPT®: ordered and reviewed  Review and summarize past medical records: yes  Independent visualization of images, tracings, or specimens: yes    Patient Progress  Patient progress: stable    Procedures    TOBACCO COUNSELING:  Upon evaluation, pt expressed that they are a current tobacco user. Pt has been counseled on the dangers of smoking and was encouraged to quit as soon as possible in order to decrease further risks to their health. Pt has conveyed their understanding of the risks involved should they continue to use tobacco products. Progress Note:  1:35 AM  Pt reports he takes BP medication and took his medication today.  He denies HA, dizziness, or blurry vision. Written by Temitope Stevenson ED Scribe, as dictated by Deidre Georges PA-C. IMAGING RESULTS:  INDICATION:  cough      COMPARISON: 10/29/2016     FINDINGS: PA and lateral views of the chest demonstrate a stable  cardiomediastinal silhouette and clear lungs bilaterally. The visualized osseous  structures are unremarkable.     IMPRESSION  IMPRESSION: No acute process            MEDICATIONS GIVEN:  Medications   naproxen (NAPROSYN) 250 mg tablet (not administered)   predniSONE (DELTASONE) 20 mg tablet (not administered)   naproxen (NAPROSYN) tablet 500 mg (500 mg Oral Given 8/9/17 0134)       IMPRESSION:  1. Acute upper respiratory infection    2. Elevated blood pressure reading        PLAN:  1. Current Discharge Medication List      START taking these medications    Details   benzonatate (TESSALON PERLES) 100 mg capsule Take 2 Caps by mouth three (3) times daily as needed for Cough for up to 7 days. Qty: 30 Cap, Refills: 0      HYDROcodone-acetaminophen (NORCO) 5-325 mg per tablet Take 1 Tab by mouth every six (6) hours as needed for Pain. Max Daily Amount: 4 Tabs. Qty: 20 Tab, Refills: 0      naproxen (NAPROSYN) 500 mg tablet Take 1 Tab by mouth every twelve (12) hours as needed for Pain. Qty: 20 Tab, Refills: 0           2. Follow-up Information     Follow up With Details Comments 0902 Keira De La Torre MD   [facility]  [address] [facility]  [phone]      [facility] EMERGENCY DEPT  If symptoms worsen [address] [place] [address] Drive  [address] [facility]  [phone]        Return to ED if worse     DISCHARGE NOTE  1:37 AM  The patient has been re-evaluated and is ready for discharge. Reviewed available results with patient. Counseled pt on diagnosis and care plan. Pt has expressed understanding, and all questions have been answered. Pt agrees with plan and agrees to F/U as recommended, or return to the ED if their sxs worsen.  Discharge instructions have been provided and explained to the pt, along with reasons to return to the ED. Written by Bob Garner, ED Scribe, as dictated by Shon Cole PA-C. This note is prepared by Bob Garner, acting as Scribe for [employer]. Shon Cole PA-C: The scribe's documentation has been prepared under my direction and personally reviewed by me in its entirety. I confirm that the note above accurately reflects all work, treatment, procedures, and medical decision making performed by me.

## 2017-08-09 NOTE — ED NOTES
ORESTES Bowden at bedside to provide discharge paperwork. Vital signs stable. Pt in no apparent distress at this time. Mental status at baseline. Ambulatory to waiting room with steady gate, discharge paperwork in hand. Accompanied by self.

## 2017-08-09 NOTE — ED TRIAGE NOTES
Patient arrives ambulatory to ED with complaint of cough for 4 weeks; patient states sputum was green and is now more clear. Patient has had upper mid back pain that started about two weeks ago. Patient has some shortness of breath. Patient denies chest pain. Patient had a fever when he \"initially caught cold. \" Patient denies N/V/D.

## 2017-08-09 NOTE — LETTER
[address] 
[facility] EMERGENCY DEPT 
[address] [place] [address] [address] [id number] [phone] Work/School Note Date: 8/8/2017 To Whom It May concern: 
 
Lyric Perez was seen and treated today in the emergency room by the following provider(s): 
Attending Provider: Kaiden Catalan. MD Padmini 
Physician Assistant: Kennis Cogan., PA. Lyric Perez may return to work on 8/12/17 or sooner, if feeling better. Sincerely, Kennis Cogan., PA

## 2017-08-09 NOTE — DISCHARGE INSTRUCTIONS
Elevated Blood Pressure: Care Instructions  Your Care Instructions    Blood pressure is a measure of how hard the blood pushes against the walls of your arteries. It's normal for blood pressure to go up and down throughout the day. But if it stays up over time, you have high blood pressure. Two numbers tell you your blood pressure. The first number is the systolic pressure. It shows how hard the blood pushes when your heart is pumping. The second number is the diastolic pressure. It shows how hard the blood pushes between heartbeats, when your heart is relaxed and filling with blood. An ideal blood pressure in adults is less than 120/80 (say \"120 over 80\"). High blood pressure is 140/90 or higher. You have high blood pressure if your top number is 140 or higher or your bottom number is 90 or higher, or both. The main test for high blood pressure is simple, fast, and painless. To diagnose high blood pressure, your doctor will test your blood pressure at different times. After testing your blood pressure, your doctor may ask you to test it again when you are home. If you are diagnosed with high blood pressure, you can work with your doctor to make a long-term plan to manage it. Follow-up care is a key part of your treatment and safety. Be sure to make and go to all appointments, and call your doctor if you are having problems. It's also a good idea to know your test results and keep a list of the medicines you take. How can you care for yourself at home? · Do not smoke. Smoking increases your risk for heart attack and stroke. If you need help quitting, talk to your doctor about stop-smoking programs and medicines. These can increase your chances of quitting for good. · Stay at a healthy weight. · Try to limit how much sodium you eat to less than 2,300 milligrams (mg) a day. Your doctor may ask you to try to eat less than 1,500 mg a day. · Be physically active.  Get at least 30 minutes of exercise on most days of the week. Walking is a good choice. You also may want to do other activities, such as running, swimming, cycling, or playing tennis or team sports. · Avoid or limit alcohol. Talk to your doctor about whether you can drink any alcohol. · Eat plenty of fruits, vegetables, and low-fat dairy products. Eat less saturated and total fats. · Learn how to check your blood pressure at home. When should you call for help? Call your doctor now or seek immediate medical care if:  · Your blood pressure is much higher than normal (such as 180/110 or higher). · You think high blood pressure is causing symptoms such as:  ¨ Severe headache. ¨ Blurry vision. Watch closely for changes in your health, and be sure to contact your doctor if:  · You do not get better as expected. Where can you learn more? Go to http://kimoAeroFarmscherry.info/. Enter O597 in the search box to learn more about \"Elevated Blood Pressure: Care Instructions. \"  Current as of: April 3, 2017  Content Version: 11.3  © 2330-7887 [employer]. Care instructions adapted under license by [employer] (which disclaims liability or warranty for this information). If you have questions about a medical condition or this instruction, always ask your healthcare professional. [address] any warranty or liability for your use of this information. Saline Nasal Washes: Care Instructions  Your Care Instructions  Saline nasal washes help keep the nasal passages open by washing out thick or dried mucus. This simple remedy can help relieve symptoms of allergies, sinusitis, and colds. It also can make the nose feel more comfortable by keeping the mucous membranes moist. You may notice a little burning sensation in your nose the first few times you use the solution, but this usually gets better in a few days. Follow-up care is a key part of your treatment and safety.  Be sure to make and go to all appointments, and call your doctor if you are having problems. It's also a good idea to know your test results and keep a list of the medicines you take. How can you care for yourself at home? · You can buy premixed saline solution in a squeeze bottle or other sinus rinse products at a drugstore. Read and follow the instructions on the label. · You also can make your own saline solution by adding 1 teaspoon of salt and 1 teaspoon of baking soda to 2 cups of distilled water. · If you use a homemade solution, pour a small amount into a clean bowl. Using a rubber bulb syringe, squeeze the syringe and place the tip in the salt water. Pull a small amount of the salt water into the syringe by relaxing your hand. · Sit down with your head tilted slightly back. Do not lie down. Put the tip of the bulb syringe or the squeeze bottle a little way into one of your nostrils. Gently drip or squirt a few drops into the nostril. Repeat with the other nostril. Some sneezing and gagging are normal at first.  · Gently blow your nose. · Wipe the syringe or bottle tip clean after each use. · Repeat this 2 or 3 times a day. · Use nasal washes gently if you have nosebleeds often. When should you call for help? Watch closely for changes in your health, and be sure to contact your doctor if:  · You often get nosebleeds. · You have problems doing the nasal washes. Where can you learn more? Go to http://kimo-cherry.info/. Enter [id number] [id number] in the search box to learn more about \"Saline Nasal Washes: Care Instructions. \"  Current as of: May 4, 2017  Content Version: 11.3  © 0957-5027 [employer]. Care instructions adapted under license by [employer] (which disclaims liability or warranty for this information).  If you have questions about a medical condition or this instruction, always ask your healthcare professional. [address] any warranty or liability for your use of this information. Upper Respiratory Infection (Cold): Care Instructions  Your Care Instructions    An upper respiratory infection, or URI, is an infection of the nose, sinuses, or throat. URIs are spread by coughs, sneezes, and direct contact. The common cold is the most frequent kind of URI. The flu and sinus infections are other kinds of URIs. Almost all URIs are caused by viruses. Antibiotics won't cure them. But you can treat most infections with home care. This may include drinking lots of fluids and taking over-the-counter pain medicine. You will probably feel better in 4 to 10 days. The doctor has checked you carefully, but problems can develop later. If you notice any problems or new symptoms, get medical treatment right away. Follow-up care is a key part of your treatment and safety. Be sure to make and go to all appointments, and call your doctor if you are having problems. It's also a good idea to know your test results and keep a list of the medicines you take. How can you care for yourself at home? · To prevent dehydration, drink plenty of fluids, enough so that your urine is light yellow or clear like water. Choose water and other caffeine-free clear liquids until you feel better. If you have kidney, heart, or liver disease and have to limit fluids, talk with your doctor before you increase the amount of fluids you drink. · Take an over-the-counter pain medicine, such as acetaminophen (Tylenol), ibuprofen (Advil, Motrin), or naproxen (Aleve). Read and follow all instructions on the label. · Before you use cough and cold medicines, check the label. These medicines may not be safe for young children or for people with certain health problems. · Be careful when taking over-the-counter cold or flu medicines and Tylenol at the same time. Many of these medicines have acetaminophen, which is Tylenol. Read the labels to make sure that you are not taking more than the recommended dose.  Too much acetaminophen (Tylenol) can be harmful. · Get plenty of rest.  · Do not smoke or allow others to smoke around you. If you need help quitting, talk to your doctor about stop-smoking programs and medicines. These can increase your chances of quitting for good. When should you call for help? Call 911 anytime you think you may need emergency care. For example, call if:  · You have severe trouble breathing. Call your doctor now or seek immediate medical care if:  · You seem to be getting much sicker. · You have new or worse trouble breathing. · You have a new or higher fever. · You have a new rash. Watch closely for changes in your health, and be sure to contact your doctor if:  · You have a new symptom, such as a sore throat, an earache, or sinus pain. · You cough more deeply or more often, especially if you notice more mucus or a change in the color of your mucus. · You do not get better as expected. Where can you learn more? Go to http://kimo-cherry.info/. Enter W791 in the search box to learn more about \"Upper Respiratory Infection (Cold): Care Instructions. \"  Current as of: March 25, 2017  Content Version: 11.3  © 0182-1414 [employer]. Care instructions adapted under license by [employer] (which disclaims liability or warranty for this information). If you have questions about a medical condition or this instruction, always ask your healthcare professional. [place] [address] any warranty or liability for your use of this information.

## 2017-08-29 ENCOUNTER — TELEPHONE (OUTPATIENT)
Dept: DIABETES SERVICES | Age: 47
End: 2017-08-29

## 2017-09-11 ENCOUNTER — OFFICE VISIT (OUTPATIENT)
Dept: INTERNAL MEDICINE CLINIC | Age: 47
End: 2017-09-11

## 2017-09-11 VITALS
HEIGHT: 75 IN | SYSTOLIC BLOOD PRESSURE: 126 MMHG | WEIGHT: 315 LBS | TEMPERATURE: 97.7 F | HEART RATE: 91 BPM | DIASTOLIC BLOOD PRESSURE: 74 MMHG | RESPIRATION RATE: 18 BRPM | BODY MASS INDEX: 39.17 KG/M2 | OXYGEN SATURATION: 95 %

## 2017-09-11 DIAGNOSIS — I10 ESSENTIAL HYPERTENSION: ICD-10-CM

## 2017-09-11 DIAGNOSIS — Z79.4 UNCONTROLLED TYPE 2 DIABETES MELLITUS WITH HYPERGLYCEMIA, WITH LONG-TERM CURRENT USE OF INSULIN (HCC): Primary | ICD-10-CM

## 2017-09-11 DIAGNOSIS — E11.65 UNCONTROLLED TYPE 2 DIABETES MELLITUS WITH HYPERGLYCEMIA, WITH LONG-TERM CURRENT USE OF INSULIN (HCC): Primary | ICD-10-CM

## 2017-09-11 DIAGNOSIS — Z23 ENCOUNTER FOR IMMUNIZATION: ICD-10-CM

## 2017-09-11 DIAGNOSIS — E78.2 MIXED HYPERLIPIDEMIA: ICD-10-CM

## 2017-09-11 PROBLEM — G47.30 SLEEP APNEA: Status: ACTIVE | Noted: 2017-09-11

## 2017-09-11 LAB
CHOLEST SERPL-MCNC: 213 MG/DL
GLUCOSE POC: 264 MG/DL
HBA1C MFR BLD HPLC: 12.7 %
HDLC SERPL-MCNC: 49 MG/DL
LDL CHOLESTEROL POC: 126 MG/DL
NON-HDL GOAL (POC): 164
TCHOL/HDL RATIO (POC): 2.6
TRIGL SERPL-MCNC: 192 MG/DL

## 2017-09-11 RX ORDER — ENALAPRIL MALEATE AND HYDROCHLOROTHIAZIDE 10; 25 MG/1; MG/1
TABLET ORAL
Qty: 180 TAB | Refills: 0 | Status: SHIPPED | OUTPATIENT
Start: 2017-09-11 | End: 2017-12-20 | Stop reason: SDUPTHER

## 2017-09-11 RX ORDER — INSULIN ASPART 100 [IU]/ML
INJECTION, SOLUTION INTRAVENOUS; SUBCUTANEOUS
Qty: 5 PEN | Refills: 11 | Status: CANCELLED | OUTPATIENT
Start: 2017-09-11

## 2017-09-11 RX ORDER — ATORVASTATIN CALCIUM 80 MG/1
80 TABLET, FILM COATED ORAL DAILY
Qty: 90 TAB | Refills: 3 | Status: SHIPPED | OUTPATIENT
Start: 2017-09-11 | End: 2018-02-01

## 2017-09-11 RX ORDER — INSULIN ASPART 100 [IU]/ML
INJECTION, SOLUTION INTRAVENOUS; SUBCUTANEOUS
Qty: 5 PEN | Refills: 11 | Status: SHIPPED | OUTPATIENT
Start: 2017-09-11 | End: 2018-02-01

## 2017-09-11 RX ORDER — ROSUVASTATIN CALCIUM 40 MG/1
40 TABLET, COATED ORAL
Qty: 90 TAB | Refills: 3 | Status: SHIPPED | OUTPATIENT
Start: 2017-09-11 | End: 2017-09-11

## 2017-09-11 NOTE — PROGRESS NOTES
Subjective: (As above and below)     Chief Complaint   Patient presents with    Follow-up     Diabetes recheck    Medication Refill     Kateryna Ramachandran is a 52y.o. year old male who presents for follow up on DM2. At last visit, A1c was very uncontrolled and he admitted to poor adherence. Since visit, he has NOT followed up with DM ed - states they were playing phone tag. He has also not seen sleep studies for routine f/u on cpap machine. He has also not scheduled with endo. Diabetic Review of Systems - medication compliance: noncompliant much of the time, diabetic diet compliance: probably noncompliant though I cannot elicit that specific history, home glucose monitoring: is performed sporadically, acute symptoms are: none    He states that he takes his levemir daily but takes humalog maybe once daily. He attributes this to not eating regularly. He does, however state that he eats 2 meals daily. He admits to snacking at work through [employer]. He does not bring DM log, he reports he gets sugars in the 120s-200's. Reviewed PmHx, RxHx, FmHx, SocHx, AllgHx and updated in chart.   Family History   Problem Relation Age of Onset    Diabetes Mother      cancer, liver? hep c    Hypertension Mother     Hypertension Father     Diabetes Brother     Hypertension Brother     Hypertension Maternal Uncle     Diabetes Maternal Grandmother     Diabetes Paternal Grandmother     Hypertension Maternal Uncle     Hypertension Maternal Uncle     Diabetes Paternal Uncle        Past Medical History:   Diagnosis Date    Asthma     Diabetes ([facility] [address]     HTN (hypertension) 3/30/2010    Hypertension     Other and unspecified hyperlipidemia 3/30/2010    Sleep apnea     Type II or unspecified type diabetes mellitus without mention of complication, uncontrolled 3/30/2010      Social History     Social History    Marital status: SINGLE     Spouse name: N/A    Number of children: N/A    Years of education: N/A Social History Main Topics    Smoking status: Current Every Day Smoker     Packs/day: 0.50     Years: 22.00     Types: Cigars    Smokeless tobacco: Never Used      Comment: black and mild    Alcohol use 6.0 oz/week     10 Standard drinks or equivalent per week      Comment: rare    Drug use: No    Sexual activity: Yes     Partners: Female     Birth control/ protection: Condom     Other Topics Concern    None     Social History Narrative    6/6/17: works at [employer]          Current Outpatient Prescriptions   Medication Sig    SITagliptin-metFORMIN (JANUMET) 50-1,000 mg per tablet Take 1 Tab by mouth two (2) times daily (with meals).  enalapril-hydroCHLOROthiazide (VASERETIC) 10-25 mg tablet TAKE 2 TABLETS BY MOUTH DAILY    insulin aspart (NOVOLOG) 100 unit/mL inpn 15 units before meals  Indications: type 2 diabetes mellitus (Patient taking differently: Before breakfast, lunch, and dinner. 15 units before meals  Indications: type 2 diabetes mellitus)    insulin detemir (LEVEMIR FLEXTOUCH) 100 unit/mL (3 mL) inpn INJECT 65 UNITS UNDER THE SKIN DAILY    atorvastatin (LIPITOR) 80 mg tablet Take 1 Tab by mouth daily.  albuterol (PROVENTIL HFA, VENTOLIN HFA, PROAIR HFA) 90 mcg/actuation inhaler Take 1-2 Puffs by inhalation every four (4) hours as needed for Wheezing.  VIAGRA 100 mg tablet TAKE 1 TABLET BY MOUTH AS NEEDED FOR SEX AS DIRECTED    Insulin Needles, Disposable, 31 gauge x 5/16\" ndle Use with insulin 4 times a day    aspirin delayed-release 81 mg tablet Take 1 Tab by mouth daily. No current facility-administered medications for this visit. Review of Systems:   Constitutional:    Negative for fever and chills, negative diaphoresis. HEENT:              Negative for neck pain and stiffness. Eyes:                  Negative for visual disturbance, itching, redness or discharge. Respiratory:        Negative for cough and shortness of breath.    Cardiovascular:  Negative for chest pain and palpitations. Gastrointestinal: Negative for nausea, vomiting, abdominal pain, diarrhea or constipation. Genitourinary:     Negative for dysuria and frequency. Musculoskeletal: Negative for falls, tenderness and swelling. Skin:                    Negative for rash, masses or lesions. Neurological:       Negative for dizzyness, seizure, loss of consciousness, weakness and numbness. Objective:     Vitals:    09/11/17 0850   BP: 126/74   Pulse: 91   Resp: 18   Temp: 97.7 °F (36.5 °C)   TempSrc: Oral   SpO2: 95%   Weight: (!) 352 lb 1.6 oz (159.7 kg)   Height: 6' 3\" (1.905 m)       Results for orders placed or performed in visit on 09/11/17   AMB POC HEMOGLOBIN A1C   Result Value Ref Range    Hemoglobin A1c (POC) 12.7 %   AMB POC GLUCOSE BLOOD, BY GLUCOSE MONITORING DEVICE   Result Value Ref Range    Glucose  mg/dL   AMB POC LIPID PROFILE   Result Value Ref Range    Cholesterol (POC) 213     Triglycerides (POC) 192     HDL Cholesterol (POC) 49     LDL Cholesterol (POC) 126 MG/DL    Non-HDL Goal (POC) 164     TChol/HDL Ratio (POC) 2.6          Physical Examination: General appearance - alert, well appearing, and in no distress  Chest - clear to auscultation, no wheezes, rales or rhonchi, symmetric air entry  Heart - normal rate, regular rhythm, normal S1, S2, no murmurs, rubs, clicks or gallops  Extremities -No lower extremity edema       Assessment/ Plan:   Follow-up Disposition:  Return in about 3 months (around 12/11/2017) for dm. He is not taking insulin regularly. A1c is WORSE than before, discussed ramifications of these numbers (risk for stroke, blindness, kidney failure). He is provided with DM ed contact info, reprinted sleep study info and endo. Informed him that he NEEDS to start taking his meds as instructed.      1. Uncontrolled type 2 diabetes mellitus with hyperglycemia, with long-term current use of insulin (HCC)    - AMB POC HEMOGLOBIN A1C  - AMB POC GLUCOSE BLOOD, BY GLUCOSE MONITORING DEVICE  - SITagliptin-metFORMIN (JANUMET) 50-1,000 mg per tablet; Take 1 Tab by mouth two (2) times daily (with meals). Dispense: 180 Tab; Refill: 0  - insulin aspart (NOVOLOG) 100 unit/mL inpn; 15 units before meals  Indications: type 2 diabetes mellitus (Patient taking differently: Before breakfast, lunch, and dinner. 15 units before meals  Indications: type 2 diabetes mellitus)  Dispense: 5 Pen; Refill: 11    2. Mixed hyperlipidemia  Change from rosuvastatin to atorvastatin  - AMB POC LIPID PROFILE  - atorvastatin (LIPITOR) 80 mg tablet; Take 1 Tab by mouth daily. Dispense: 90 Tab; Refill: 3    3. Essential hypertension    - enalapril-hydroCHLOROthiazide (VASERETIC) 10-25 mg tablet; TAKE 2 TABLETS BY MOUTH DAILY  Dispense: 180 Tab; Refill: 0    4. Encounter for immunization    - Pneumococcal polysaccharide vaccine, 23-valent, adult or immunosuppressed pt dose  - Influenza virus vaccine (QUADRIVALENT PRES FREE SYRINGE) IM (61981)      I have discussed the diagnosis with the patient and the intended plan as seen in the above orders. The patient has received an after-visit summary and questions were answered concerning future plans. Pt conveyed understanding of plan. Medication Side Effects and Warnings were discussed with patient: yes  Patient Labs were reviewed: yes  Patient Past Records were reviewed:  yes    Karli Moreno.  Dara Fraser NP

## 2017-09-11 NOTE — MR AVS SNAPSHOT
Visit Information Date & Time Provider Department Dept. Phone Encounter #  
 9/11/2017  8:45 AM Lesa SALAS Martha Martinezs, [address] [id number] Follow-up Instructions Return in about 3 months (around 12/11/2017) for dm. Upcoming Health Maintenance Date Due Pneumococcal 19-64 Medium Risk (1 of 1 - PPSV23) 5/6/1989 DTaP/Tdap/Td series (1 - Tdap) 5/6/1991 EYE EXAM RETINAL OR DILATED Q1 2/10/2016 INFLUENZA AGE 9 TO ADULT 8/1/2017 HEMOGLOBIN A1C Q6M 12/6/2017 FOOT EXAM Q1 6/6/2018 MICROALBUMIN Q1 6/6/2018 LIPID PANEL Q1 6/6/2018 Allergies as of 9/11/2017  Review Complete On: 9/11/2017 By: Odilon Dillard LPN Severity Noted Reaction Type Reactions Diazepam  08/17/2016    Other (comments) Caused hallucinations, paranoia Shellfish Derived  05/10/2015    Angioedema Current Immunizations  Never Reviewed No immunizations on file. Not reviewed this visit You Were Diagnosed With   
  
 Codes Comments Uncontrolled type 2 diabetes mellitus with hyperglycemia, with long-term current use of insulin (HCC)    -  Primary ICD-10-CM: E11.65, Z79.4 ICD-9-CM: 250.02, V58.67 Mixed hyperlipidemia     ICD-10-CM: E78.2 ICD-9-CM: 272.2 Essential hypertension     ICD-10-CM: I10 
ICD-9-CM: 401.9 Encounter for immunization     ICD-10-CM: J16 ICD-9-CM: V03.89 Vitals BP Pulse Temp Resp Height(growth percentile) Weight(growth percentile) 126/74 (BP 1 Location: Left arm, BP Patient Position: Sitting) 91 97.7 °F (36.5 °C) (Oral) 18 6' 3\" (1.905 m) (!) 352 lb 1.6 oz (159.7 kg) SpO2 BMI Smoking Status 95% 44.01 kg/m2 Current Every Day Smoker BMI and BSA Data Body Mass Index Body Surface Area 44.01 kg/m 2 2.91 m 2 Preferred Pharmacy Pharmacy Name Phone  [address] [facility],[address], [address] [phone] Your Updated Medication List  
  
   
This list is accurate as of: 9/11/17  9:22 AM.  Always use your most recent med list.  
  
  
  
  
 albuterol 90 mcg/actuation inhaler Commonly known as:  PROVENTIL HFA, VENTOLIN HFA, PROAIR HFA Take 1-2 Puffs by inhalation every four (4) hours as needed for Wheezing. aspirin delayed-release 81 mg tablet Take 1 Tab by mouth daily. atorvastatin 80 mg tablet Commonly known as:  LIPITOR Take 1 Tab by mouth daily. enalapril-hydroCHLOROthiazide 10-25 mg tablet Commonly known as:  VASERETIC  
TAKE 2 TABLETS BY MOUTH DAILY  
  
 insulin aspart 100 unit/mL Inpn Commonly known as:  Sanaz Downing 15 units before meals  Indications: type 2 diabetes mellitus  
  
 insulin detemir 100 unit/mL (3 mL) Inpn Commonly known as:  Myrtle Still INJECT 65 UNITS UNDER THE SKIN DAILY Insulin Needles (Disposable) 31 gauge x 5/16\" Ndle Use with insulin 4 times a day SITagliptin-metFORMIN 50-1,000 mg per tablet Commonly known as:  Robert Aviles Take 1 Tab by mouth two (2) times daily (with meals). VIAGRA 100 mg tablet Generic drug:  sildenafil citrate TAKE 1 TABLET BY MOUTH AS NEEDED FOR SEX AS DIRECTED Prescriptions Sent to Pharmacy Refills SITagliptin-metFORMIN (JANUMET) 50-1,000 mg per tablet 0 Sig: Take 1 Tab by mouth two (2) times daily (with meals). Class: Normal  
 Pharmacy: [facility] [facility], [address] [place] [address] Ph #: [phone] Route: Oral  
 enalapril-hydroCHLOROthiazide (VASERETIC) 10-25 mg tablet 0 Sig: TAKE 2 TABLETS BY MOUTH DAILY Class: Normal  
 Pharmacy: [facility] [facility], [address] Ph #: [phone]  insulin aspart (NOVOLOG) 100 unit/mL inpn 11  
 Sig: 15 units before meals  Indications: type 2 diabetes mellitus Class: Normal  
 Pharmacy: [facility], [address] [facility] [address] [place] [address] Road Ph #: [phone] insulin detemir (LEVEMIR FLEXTOUCH) 100 unit/mL (3 mL) inpn 3 Sig: INJECT 65 UNITS UNDER THE SKIN DAILY Class: Normal  
 Pharmacy: [facility] [address] [facility] AT [address] [place] [address] Road Ph #: [phone]  
 atorvastatin (LIPITOR) 80 mg tablet 3 Sig: Take 1 Tab by mouth daily. Class: Normal  
 Pharmacy: [facility], [address] [facility] [address] [place] [address] Ph #: [phone] Route: Oral  
  
We Performed the Following AMB POC GLUCOSE BLOOD, BY GLUCOSE MONITORING DEVICE [53242 CPT(R)] AMB POC HEMOGLOBIN A1C [89940 CPT(R)] AMB POC LIPID PROFILE [46010 CPT(R)] Follow-up Instructions Return in about 3 months (around 12/11/2017) for dm. Patient Instructions 1. Diabetes education: [phone] 
2. Your diabetes numbers are WORSE than before, I need you to go to the diabetes specialist to help get these numbers under control, but they will never get better unless you start taking your medicine as directed. 3. Sleep apnea dr info given again 4. Your cholesterol shows some improvement, but not at goal. You are maxed out on your previous cholesterol medication so I am changing your to atorvastatin instead (stop rosuvastatin) Introducing [address]! Jennifer Burger introduces [employer] patient portal. Now you can access parts of your medical record, email your doctor's office, and request medication refills online. 1. In your internet browser, go to [email or web address] 2. Click on the First Time User? Click Here link in the Sign In box. You will see the New Member Sign Up page. 3. Enter your Improveit! 360 Access Code exactly as it appears below. You will not need to use this code after youve completed the sign-up process. If you do not sign up before the expiration date, you must request a new code. · Improveit! 360 Access Code: [email or web address] Expires: 12/10/2017  8:47 AM 
 
4. Enter the last four digits of your Social Security Number (xxxx) and Date of Birth (mm/dd/yyyy) as indicated and click Submit. You will be taken to the next sign-up page. 5. Create a Improveit! 360 ID. This will be your Improveit! 360 login ID and cannot be changed, so think of one that is secure and easy to remember. 6. Create a [employer] 360 password. You can change your password at any time. 7. Enter your Password Reset Question and Answer. This can be used at a later time if you forget your password. 8. Enter your e-mail address. You will receive e-mail notification when new information is available in [address]. 9. Click Sign Up. You can now view and download portions of your medical record. 10. Click the Download Summary menu link to download a portable copy of your medical information. If you have questions, please visit the Frequently Asked Questions section of the Improveit! 360 website. Remember, Improveit! 360 is NOT to be used for urgent needs. For medical emergencies, dial 911. Now available from your iPhone and Android! Please provide this summary of care documentation to your next provider. Your primary care clinician is listed as Leidy Silva. If you have any questions after today's visit, please call [phone].

## 2017-09-11 NOTE — PATIENT INSTRUCTIONS
1. Diabetes education: [phone]  2. Your diabetes numbers are WORSE than before, I need you to go to the diabetes specialist to help get these numbers under control, but they will never get better unless you start taking your medicine as directed. 3. Sleep apnea dr info given again  4.  Your cholesterol shows some improvement, but not at goal. You are maxed out on your previous cholesterol medication so I am changing your to atorvastatin instead (stop rosuvastatin)

## 2017-09-11 NOTE — PROGRESS NOTES
Pt here for   Chief Complaint   Patient presents with    Follow-up     Diabetes recheck    Medication Refill     1. Have you been to the ER, urgent care clinic since your last visit? Hospitalized since your last visit? Yes When: [facility] one month ago SOB    2. Have you seen or consulted any other health care providers outside of the [address] [place] [address] since your last visit? Include any pap smears or colon screening.  No     Pt denies pain at this time      PHQ over the last two weeks 9/11/2017   PHQ Not Done -   Little interest or pleasure in doing things Not at all   Feeling down, depressed or hopeless Not at all   Total Score PHQ 2 0

## 2017-09-15 RX ORDER — SILDENAFIL CITRATE 100 MG/1
TABLET, FILM COATED ORAL
Qty: 9 TAB | Refills: 0 | Status: SHIPPED | OUTPATIENT
Start: 2017-09-15 | End: 2017-12-20 | Stop reason: SDUPTHER

## 2017-10-23 ENCOUNTER — OFFICE VISIT (OUTPATIENT)
Dept: ENDOCRINOLOGY | Age: 47
End: 2017-10-23

## 2017-11-17 DIAGNOSIS — E11.65 UNCONTROLLED TYPE 2 DIABETES MELLITUS WITH HYPERGLYCEMIA, WITH LONG-TERM CURRENT USE OF INSULIN (HCC): ICD-10-CM

## 2017-11-17 DIAGNOSIS — Z79.4 UNCONTROLLED TYPE 2 DIABETES MELLITUS WITH HYPERGLYCEMIA, WITH LONG-TERM CURRENT USE OF INSULIN (HCC): ICD-10-CM

## 2017-11-19 RX ORDER — PEN NEEDLE, DIABETIC 30 GX3/16"
NEEDLE, DISPOSABLE MISCELLANEOUS
Qty: 4 PACKAGE | Refills: 11 | Status: SHIPPED | OUTPATIENT
Start: 2017-11-19 | End: 2017-11-20 | Stop reason: SDUPTHER

## 2017-11-20 RX ORDER — PEN NEEDLE, DIABETIC 30 GX3/16"
NEEDLE, DISPOSABLE MISCELLANEOUS
Qty: 4 PACKAGE | Refills: 11 | Status: SHIPPED | OUTPATIENT
Start: 2017-11-20 | End: 2018-12-17

## 2017-12-20 DIAGNOSIS — E11.65 UNCONTROLLED TYPE 2 DIABETES MELLITUS WITH HYPERGLYCEMIA, WITH LONG-TERM CURRENT USE OF INSULIN (HCC): ICD-10-CM

## 2017-12-20 DIAGNOSIS — I10 ESSENTIAL HYPERTENSION: ICD-10-CM

## 2017-12-20 DIAGNOSIS — Z79.4 UNCONTROLLED TYPE 2 DIABETES MELLITUS WITH HYPERGLYCEMIA, WITH LONG-TERM CURRENT USE OF INSULIN (HCC): ICD-10-CM

## 2017-12-21 DIAGNOSIS — I10 ESSENTIAL HYPERTENSION: ICD-10-CM

## 2017-12-21 DIAGNOSIS — E11.65 UNCONTROLLED TYPE 2 DIABETES MELLITUS WITH HYPERGLYCEMIA, WITH LONG-TERM CURRENT USE OF INSULIN (HCC): ICD-10-CM

## 2017-12-21 DIAGNOSIS — Z79.4 UNCONTROLLED TYPE 2 DIABETES MELLITUS WITH HYPERGLYCEMIA, WITH LONG-TERM CURRENT USE OF INSULIN (HCC): ICD-10-CM

## 2017-12-21 RX ORDER — SITAGLIPTIN AND METFORMIN HYDROCHLORIDE 50; 1000 MG/1; MG/1
TABLET, FILM COATED ORAL
Qty: 180 TAB | Refills: 0 | Status: SHIPPED | OUTPATIENT
Start: 2017-12-21 | End: 2017-12-21 | Stop reason: SDUPTHER

## 2017-12-21 RX ORDER — ENALAPRIL MALEATE AND HYDROCHLOROTHIAZIDE 10; 25 MG/1; MG/1
TABLET ORAL
Qty: 180 TAB | Refills: 0 | Status: SHIPPED | OUTPATIENT
Start: 2017-12-21 | End: 2017-12-21 | Stop reason: SDUPTHER

## 2017-12-21 RX ORDER — ENALAPRIL MALEATE AND HYDROCHLOROTHIAZIDE 10; 25 MG/1; MG/1
TABLET ORAL
Qty: 180 TAB | Refills: 0 | Status: SHIPPED | OUTPATIENT
Start: 2017-12-21 | End: 2018-03-31 | Stop reason: SDUPTHER

## 2017-12-21 RX ORDER — SILDENAFIL CITRATE 100 MG/1
TABLET, FILM COATED ORAL
Qty: 9 TAB | Refills: 0 | Status: SHIPPED | OUTPATIENT
Start: 2017-12-21 | End: 2018-03-31 | Stop reason: SDUPTHER

## 2018-01-19 ENCOUNTER — OFFICE VISIT (OUTPATIENT)
Dept: INTERNAL MEDICINE CLINIC | Age: 48
End: 2018-01-19

## 2018-01-19 VITALS
TEMPERATURE: 96.9 F | DIASTOLIC BLOOD PRESSURE: 85 MMHG | SYSTOLIC BLOOD PRESSURE: 133 MMHG | RESPIRATION RATE: 18 BRPM | BODY MASS INDEX: 39.17 KG/M2 | OXYGEN SATURATION: 95 % | HEART RATE: 88 BPM | WEIGHT: 315 LBS | HEIGHT: 75 IN

## 2018-01-19 DIAGNOSIS — Z79.4 UNCONTROLLED TYPE 2 DIABETES MELLITUS WITH HYPERGLYCEMIA, WITH LONG-TERM CURRENT USE OF INSULIN (HCC): Primary | ICD-10-CM

## 2018-01-19 DIAGNOSIS — E11.65 UNCONTROLLED TYPE 2 DIABETES MELLITUS WITH HYPERGLYCEMIA, WITH LONG-TERM CURRENT USE OF INSULIN (HCC): Primary | ICD-10-CM

## 2018-01-19 PROBLEM — E66.01 OBESITY, MORBID (HCC): Status: ACTIVE | Noted: 2018-01-19

## 2018-01-19 PROBLEM — Z91.199 PATIENT NON ADHERENCE: Status: ACTIVE | Noted: 2018-01-19

## 2018-01-19 LAB
GLUCOSE POC: 187 MG/DL
HBA1C MFR BLD HPLC: 10.5 %

## 2018-01-19 NOTE — PROGRESS NOTES
Pt here for   Chief Complaint   Patient presents with    Follow-up     3 month recheck    Diabetes     1. Have you been to the ER, urgent care clinic since your last visit? Hospitalized since your last visit? No    2. Have you seen or consulted any other health care providers outside of the [address] [place] [address] since your last visit? Include any pap smears or colon screening.  No     Pt denies pain at this time      PHQ over the last two weeks 1/19/2018   PHQ Not Done -   Little interest or pleasure in doing things Not at all   Feeling down, depressed or hopeless Not at all   Total Score PHQ 2 0

## 2018-01-19 NOTE — PATIENT INSTRUCTIONS

## 2018-01-19 NOTE — PROGRESS NOTES
Subjective: (As above and below)     Chief Complaint   Patient presents with    Follow-up     3 month recheck    Diabetes     Alessandro Jenkins is a 52y.o. year old male who presents for DM2    Diabetic Review of Systems - medication compliance: noncompliant much of the time, diabetic diet compliance: noncompliant much of the time, home glucose monitoring: is performed. Sporadically. He works variable hours, often night shifts and states that he does not bring his insulin to work because someone \"messed with his meds before\" He offers several reasons for not adhering to his meds: odd hours, only eats 1 large meal per day so doesn't do mealtime insulin, forgets to take the pill. He checks his sugar once nightly after dinner and \"it is high\" did not bring log    Wt Readings from Last 3 Encounters:   01/19/18 349 lb 9.6 oz (158.6 kg)   09/11/17 (!) 352 lb 1.6 oz (159.7 kg)   08/08/17 (!) 354 lb 11.5 oz (160.9 kg)     He was previously ref'd to endo but was not seen. He has not f/u with sleep studies or DM ed bc he was working a lot of overtime. He has more time now and is willing to go to DM ed. Reviewed PmHx, RxHx, FmHx, SocHx, AllgHx and updated in chart.   Family History   Problem Relation Age of Onset    Diabetes Mother      cancer, liver? hep c    Hypertension Mother     Hypertension Father     Diabetes Brother     Hypertension Brother     Hypertension Maternal Uncle     Diabetes Maternal Grandmother     Diabetes Paternal Grandmother     Hypertension Maternal Uncle     Hypertension Maternal Uncle     Diabetes Paternal Uncle        Past Medical History:   Diagnosis Date    Asthma     Diabetes ([facility] [address]     HTN (hypertension) 3/30/2010    Hypertension     Other and unspecified hyperlipidemia 3/30/2010    Sleep apnea     Type II or unspecified type diabetes mellitus without mention of complication, uncontrolled 3/30/2010      Social History     Social History    Marital status: SINGLE     Spouse name: N/A    Number of children: N/A    Years of education: N/A     Social History Main Topics    Smoking status: Current Every Day Smoker     Packs/day: 0.50     Years: 22.00     Types: Cigars    Smokeless tobacco: Never Used      Comment: black and mild    Alcohol use 6.0 oz/week     10 Standard drinks or equivalent per week      Comment: rare    Drug use: No    Sexual activity: Yes     Partners: Female     Birth control/ protection: Condom     Other Topics Concern    None     Social History Narrative    6/6/17: works at [address] [place] [address] [place] Prescriptions   Medication Sig    VIAGRA 100 mg tablet TAKE 1 TABLET BY MOUTH EVERY DAY AS NEEDED AS DIRECTED    SITagliptin-metFORMIN (JANUMET) 50-1,000 mg per tablet TAKE 1 TABLET BY MOUTH TWICE DAILY WITH MEALS    enalapril-hydroCHLOROthiazide (VASERETIC) 10-25 mg tablet TAKE 2 TABLETS BY MOUTH DAILY    Insulin Needles, Disposable, 31 gauge x 5/16\" ndle Use with insulin 4 times a day    insulin aspart (NOVOLOG) 100 unit/mL inpn 15 units before meals  Indications: type 2 diabetes mellitus (Patient taking differently: Before breakfast, lunch, and dinner. 15 units before meals  Indications: type 2 diabetes mellitus)    insulin detemir (LEVEMIR FLEXTOUCH) 100 unit/mL (3 mL) inpn INJECT 65 UNITS UNDER THE SKIN DAILY    atorvastatin (LIPITOR) 80 mg tablet Take 1 Tab by mouth daily.  albuterol (PROVENTIL HFA, VENTOLIN HFA, PROAIR HFA) 90 mcg/actuation inhaler Take 1-2 Puffs by inhalation every four (4) hours as needed for Wheezing.  aspirin delayed-release 81 mg tablet Take 1 Tab by mouth daily. No current facility-administered medications for this visit. Review of Systems:   Constitutional:    Negative for fever and chills, negative diaphoresis. HEENT:              Negative for neck pain and stiffness. Eyes:                  Negative for visual disturbance, itching, redness or discharge.    Respiratory:        Negative for cough and shortness of breath. Cardiovascular:  Negative for chest pain and palpitations. Gastrointestinal: Negative for nausea, vomiting, abdominal pain, diarrhea or constipation. Genitourinary:     Negative for dysuria and frequency. Musculoskeletal: Negative for falls, tenderness and swelling. Skin:                    Negative for rash, masses or lesions. Neurological:       Negative for dizzyness, seizure, loss of consciousness, weakness and numbness. Objective:     Vitals:    01/19/18 1047   BP: 133/85   Pulse: 88   Resp: 18   Temp: 96.9 °F (36.1 °C)   TempSrc: Oral   SpO2: 95%   Weight: 349 lb 9.6 oz (158.6 kg)   Height: 6' 3\" (1.905 m)       Results for orders placed or performed in visit on 01/19/18   AMB POC HEMOGLOBIN A1C   Result Value Ref Range    Hemoglobin A1c (POC) 10.5 %   AMB POC GLUCOSE BLOOD, BY GLUCOSE MONITORING DEVICE   Result Value Ref Range    Glucose  mg/dL         Physical Examination: General appearance - alert, well appearing, and in no distress and overweight  Chest - clear to auscultation, no wheezes, rales or rhonchi, symmetric air entry  Heart - normal rate, regular rhythm, normal S1, S2, no murmurs, rubs, clicks or gallops  Extremities -No lower extremity edema      Assessment/ Plan:   Follow-up Disposition:  Return in about 3 months (around 4/19/2018). Discussed diet, refer to DM ED. Appreciate endo input as his work hours meal schedule seem inflexible at this point. Encouraged adherence to pills. Discussed implications of poorly treated DM  He has an eye exam at the end of this month scheduled        1.  Uncontrolled type 2 diabetes mellitus with hyperglycemia, with long-term current use of insulin ([facility])    - AMB POC HEMOGLOBIN A1C  - AMB POC GLUCOSE BLOOD, BY GLUCOSE MONITORING DEVICE  - REFERRAL TO ENDOCRINOLOGY  - LIPID PANEL  - METABOLIC PANEL, COMPREHENSIVE  - REFERRAL TO DIABETIC EDUCATION        I have discussed the diagnosis with the patient and the intended plan as seen in the above orders. The patient has received an after-visit summary and questions were answered concerning future plans. Pt conveyed understanding of plan. Medication Side Effects and Warnings were discussed with patient: yes  Patient Labs were reviewed: yes  Patient Past Records were reviewed:  yes    Robbin Connors.  Dionne Quezada NP

## 2018-01-19 NOTE — MR AVS SNAPSHOT
[address] [facility] [address] [address] 
[phone] Patient: Kennedy Langford MRN: [id number] OSV:0/1/4630 Visit Information Date & Time Provider Department Dept. Phone Encounter #  
 1/19/2018 10:45 AM Lesa Swartz [place], [address] [id number] Follow-up Instructions Return in about 3 months (around 4/19/2018). Upcoming Health Maintenance Date Due DTaP/Tdap/Td series (1 - Tdap) 5/6/1991 EYE EXAM RETINAL OR DILATED Q1 2/10/2016 HEMOGLOBIN A1C Q6M 3/11/2018 FOOT EXAM Q1 6/6/2018 MICROALBUMIN Q1 6/6/2018 LIPID PANEL Q1 9/11/2018 Allergies as of 1/19/2018  Review Complete On: 1/19/2018 By: Tara Bo LPN Severity Noted Reaction Type Reactions Diazepam  08/17/2016    Other (comments) Caused hallucinations, paranoia Shellfish Derived  05/10/2015    Angioedema Current Immunizations  Never Reviewed Name Date Influenza Vaccine (Quad) PF 9/11/2017 Pneumococcal Polysaccharide (PPSV-23) 9/11/2017 Not reviewed this visit You Were Diagnosed With   
  
 Codes Comments Uncontrolled type 2 diabetes mellitus with hyperglycemia, with long-term current use of insulin (HCC)    -  Primary ICD-10-CM: E11.65, Z79.4 ICD-9-CM: 250.02, V58.67 Vitals BP Pulse Temp Resp Height(growth percentile) Weight(growth percentile) 133/85 (BP 1 Location: Left arm, BP Patient Position: Sitting) 88 96.9 °F (36.1 °C) (Oral) 18 6' 3\" (1.905 m) 349 lb 9.6 oz (158.6 kg) SpO2 BMI Smoking Status 95% 43.7 kg/m2 Current Every Day Smoker BMI and BSA Data Body Mass Index Body Surface Area 43.7 kg/m 2 2.9 m 2 Preferred Pharmacy Pharmacy Name Phone [facility] [address]  [place] [phone] Your Updated Medication List  
  
   
 This list is accurate as of: 1/19/18 11:20 AM.  Always use your most recent med list.  
  
  
  
  
 albuterol 90 mcg/actuation inhaler Commonly known as:  PROVENTIL HFA, VENTOLIN HFA, PROAIR HFA Take 1-2 Puffs by inhalation every four (4) hours as needed for Wheezing. aspirin delayed-release 81 mg tablet Take 1 Tab by mouth daily. atorvastatin 80 mg tablet Commonly known as:  LIPITOR Take 1 Tab by mouth daily. enalapril-hydroCHLOROthiazide 10-25 mg tablet Commonly known as:  VASERETIC  
TAKE 2 TABLETS BY MOUTH DAILY  
  
 insulin aspart 100 unit/mL Inpn Commonly known as:  Ying Talha 15 units before meals  Indications: type 2 diabetes mellitus  
  
 insulin detemir 100 unit/mL (3 mL) Inpn Commonly known as:  Mayra Delperi INJECT 65 UNITS UNDER THE SKIN DAILY Insulin Needles (Disposable) 31 gauge x 5/16\" Ndle Use with insulin 4 times a day SITagliptin-metFORMIN 50-1,000 mg per tablet Commonly known as:  JANUMET  
TAKE 1 TABLET BY MOUTH TWICE DAILY WITH MEALS  
  
 VIAGRA 100 mg tablet Generic drug:  sildenafil citrate TAKE 1 TABLET BY MOUTH EVERY DAY AS NEEDED AS DIRECTED We Performed the Following AMB POC GLUCOSE BLOOD, BY GLUCOSE MONITORING DEVICE [51190 CPT(R)] AMB POC HEMOGLOBIN A1C [43006 CPT(R)] LIPID PANEL [09329 CPT(R)] METABOLIC PANEL, COMPREHENSIVE [38396 CPT(R)] REFERRAL TO DIABETIC EDUCATION [REF20 Custom] REFERRAL TO ENDOCRINOLOGY [NME42 Custom] Follow-up Instructions Return in about 3 months (around 4/19/2018). Referral Information Referral ID Referred By Referred To  
  
 [id number] Timothy Plummer MD   
   [address] [place] [address] [address] [place] [address] Phone: [phone] Fax: [phone] Visits Status Start Date End Date 1 New Request 1/19/18 1/19/19 If your referral has a status of pending review or denied, additional information will be sent to support the outcome of this decision. Referral ID Referred By Referred To  
 [id number] Greg Caro PERNELL Not Available Visits Status Start Date End Date 1 New Request 1/19/18 1/19/19 If your referral has a status of pending review or denied, additional information will be sent to support the outcome of this decision. Patient Instructions Learning About Diabetes Food Guidelines Your Care Instructions Meal planning is important to manage diabetes. It helps keep your blood sugar at a target level (which you set with your doctor). You don't have to eat special foods. You can eat what your family eats, including sweets once in a while. But you do have to pay attention to how often you eat and how much you eat of certain foods. You may want to work with a dietitian or a certified diabetes educator (CDE) to help you plan meals and snacks. A dietitian or CDE can also help you lose weight if that is one of your goals. What should you know about eating carbs? Managing the amount of carbohydrate (carbs) you eat is an important part of healthy meals when you have diabetes. Carbohydrate is found in many foods. · Learn which foods have carbs. And learn the amounts of carbs in different foods. ¨ Bread, cereal, pasta, and rice have about 15 grams of carbs in a serving. A serving is 1 slice of bread (1 ounce), ½ cup of cooked cereal, or 1/3 cup of cooked pasta or rice. ¨ Fruits have 15 grams of carbs in a serving. A serving is 1 small fresh fruit, such as an apple or orange; ½ of a banana; ½ cup of cooked or canned fruit; ½ cup of fruit juice; 1 cup of melon or raspberries; or 2 tablespoons of dried fruit. ¨ Milk and no-sugar-added yogurt have 15 grams of carbs in a serving. A serving is 1 cup of milk or 2/3 cup of no-sugar-added yogurt. ¨ Starchy vegetables have 15 grams of carbs in a serving. A serving is ½ cup of mashed potatoes or sweet potato; 1 cup winter squash; ½ of a small baked potato; ½ cup of cooked beans; or ½ cup cooked corn or green peas. · Learn how much carbs to eat each day and at each meal. A dietitian or CDE can teach you how to keep track of the amount of carbs you eat. This is called carbohydrate counting. · If you are not sure how to count carbohydrate grams, use the Plate Method to plan meals. It is a good, quick way to make sure that you have a balanced meal. It also helps you spread carbs throughout the day. ¨ Divide your plate by types of foods. Put non-starchy vegetables on half the plate, meat or other protein food on one-quarter of the plate, and a grain or starchy vegetable in the final quarter of the plate. To this you can add a small piece of fruit and 1 cup of milk or yogurt, depending on how many carbs you are supposed to eat at a meal. 
· Try to eat about the same amount of carbs at each meal. Do not \"save up\" your daily allowance of carbs to eat at one meal. 
· Proteins have very little or no carbs per serving. Examples of proteins are beef, chicken, turkey, fish, eggs, tofu, cheese, cottage cheese, and peanut butter. A serving size of meat is 3 ounces, which is about the size of a deck of cards. Examples of meat substitute serving sizes (equal to 1 ounce of meat) are 1/4 cup of cottage cheese, 1 egg, 1 tablespoon of peanut butter, and ½ cup of tofu. How can you eat out and still eat healthy? · Learn to estimate the serving sizes of foods that have carbohydrate. If you measure food at home, it will be easier to estimate the amount in a serving of restaurant food. · If the meal you order has too much carbohydrate (such as potatoes, corn, or baked beans), ask to have a low-carbohydrate food instead. Ask for a salad or green vegetables.  
· If you use insulin, check your blood sugar before and after eating out to help you plan how much to eat in the future. · If you eat more carbohydrate at a meal than you had planned, take a walk or do other exercise. This will help lower your blood sugar. What else should you know? · Limit saturated fat, such as the fat from meat and dairy products. This is a healthy choice because people who have diabetes are at higher risk of heart disease. So choose lean cuts of meat and nonfat or low-fat dairy products. Use olive or canola oil instead of butter or shortening when cooking. · Don't skip meals. Your blood sugar may drop too low if you skip meals and take insulin or certain medicines for diabetes. · Check with your doctor before you drink alcohol. Alcohol can cause your blood sugar to drop too low. Alcohol can also cause a bad reaction if you take certain diabetes medicines. Follow-up care is a key part of your treatment and safety. Be sure to make and go to all appointments, and call your doctor if you are having problems. It's also a good idea to know your test results and keep a list of the medicines you take. Where can you learn more? Go to http://kimoSintecMediacherry.info/. Enter V574 in the search box to learn more about \"Learning About Diabetes Food Guidelines. \" Current as of: March 13, 2017 Content Version: 11.4 © 1788-9572 [employer]. Care instructions adapted under license by [employer] (which disclaims liability or warranty for this information). If you have questions about a medical condition or this instruction, always ask your healthcare professional. [place] [address] any warranty or liability for your use of this information. Introducing [facility]! Nicolle Gutierrez introduces [employer] patient portal. Now you can access parts of your medical record, email your doctor's office, and request medication refills online. 1. In your internet browser, go to [email or web address] 2. Click on the First Time User? Click Here link in the Sign In box. You will see the New Member Sign Up page. 3. Enter your [employer] Access Code exactly as it appears below. You will not need to use this code after youve completed the sign-up process. If you do not sign up before the expiration date, you must request a new code. · [employer] Access Code: [email or web address] Expires: 4/19/2018 10:42 AM 
 
4. Enter the last four digits of your Social Security Number (xxxx) and Date of Birth (mm/dd/yyyy) as indicated and click Submit. You will be taken to the next sign-up page. 5. Create a [employer] ID. This will be your [employer] login ID and cannot be changed, so think of one that is secure and easy to remember. 6. Create a [employer] password. You can change your password at any time. 7. Enter your Password Reset Question and Answer. This can be used at a later time if you forget your password. 8. Enter your e-mail address. You will receive e-mail notification when new information is available in [address] 9. Click Sign Up. You can now view and download portions of your medical record. 10. Click the Download Summary menu link to download a portable copy of your medical information. If you have questions, please visit the Frequently Asked Questions section of the [employer] website. Remember, [employer] is NOT to be used for urgent needs. For medical emergencies, dial 911. Now available from your iPhone and Android! Please provide this summary of care documentation to your next provider. Your primary care clinician is listed as Tahir Byrne. If you have any questions after today's visit, please call [phone].

## 2018-01-26 ENCOUNTER — TELEPHONE (OUTPATIENT)
Dept: DIABETES SERVICES | Age: 48
End: 2018-01-26

## 2018-02-01 ENCOUNTER — OFFICE VISIT (OUTPATIENT)
Dept: INTERNAL MEDICINE CLINIC | Age: 48
End: 2018-02-01

## 2018-02-01 VITALS
BODY MASS INDEX: 39.17 KG/M2 | WEIGHT: 315 LBS | RESPIRATION RATE: 18 BRPM | DIASTOLIC BLOOD PRESSURE: 95 MMHG | HEIGHT: 75 IN | OXYGEN SATURATION: 94 % | HEART RATE: 85 BPM | SYSTOLIC BLOOD PRESSURE: 156 MMHG | TEMPERATURE: 96.8 F

## 2018-02-01 DIAGNOSIS — Z79.4 UNCONTROLLED TYPE 2 DIABETES MELLITUS WITH HYPERGLYCEMIA, WITH LONG-TERM CURRENT USE OF INSULIN (HCC): Primary | ICD-10-CM

## 2018-02-01 DIAGNOSIS — E11.65 UNCONTROLLED TYPE 2 DIABETES MELLITUS WITH HYPERGLYCEMIA, WITH LONG-TERM CURRENT USE OF INSULIN (HCC): Primary | ICD-10-CM

## 2018-02-01 RX ORDER — INSULIN LISPRO 100 [IU]/ML
15 INJECTION, SOLUTION INTRAVENOUS; SUBCUTANEOUS
Qty: 1 PACKAGE | Refills: 11 | Status: SHIPPED | OUTPATIENT
Start: 2018-02-01 | End: 2018-05-10 | Stop reason: SDUPTHER

## 2018-02-01 RX ORDER — ROSUVASTATIN CALCIUM 40 MG/1
TABLET, COATED ORAL
COMMUNITY
Start: 2018-01-29 | End: 2018-08-07 | Stop reason: SDUPTHER

## 2018-02-01 NOTE — MR AVS SNAPSHOT
[facility] 
 
 
 [place] [address] [address] 
[phone] Patient: En Corona MRN: [id number] VZK:[id number] Visit Information Date & Time Provider Department Dept. Phone Encounter #  
 2/1/2018  8:00 AM Lesa Aden, [address]. [id number] Your Appointments 4/19/2018  8:40 AM  
ROUTINE CARE with Lesa Aden, NP  
[facility] Appt Note: 3 month follow up [place] [address] [address]  
[phone]  
  
   
 [facility] [address] [facility] [address] [place] [address] Upcoming Health Maintenance Date Due DTaP/Tdap/Td series (1 - Tdap) 5/6/1991 EYE EXAM RETINAL OR DILATED Q1 2/10/2016 FOOT EXAM Q1 6/6/2018 MICROALBUMIN Q1 6/6/2018 HEMOGLOBIN A1C Q6M 7/19/2018 LIPID PANEL Q1 9/11/2018 Allergies as of 2/1/2018  Review Complete On: 2/1/2018 By: Judy Marshall LPN Severity Noted Reaction Type Reactions Diazepam  08/17/2016    Other (comments) Caused hallucinations, paranoia Shellfish Derived  05/10/2015    Angioedema Current Immunizations  Never Reviewed Name Date Influenza Vaccine (Quad) PF 9/11/2017 Pneumococcal Polysaccharide (PPSV-23) 9/11/2017 Not reviewed this visit You Were Diagnosed With   
  
 Codes Comments Uncontrolled type 2 diabetes mellitus with hyperglycemia, with long-term current use of insulin (HCC)    -  Primary ICD-10-CM: E11.65, Z79.4 ICD-9-CM: 250.02, V58.67 Vitals BP Pulse Temp Resp Height(growth percentile) Weight(growth percentile) (!) 156/95 (BP 1 Location: Right arm, BP Patient Position: Sitting) 85 96.8 °F (36 °C) (Oral) 18 6' 3\" (1.905 m) 344 lb 14.4 oz (156.4 kg) SpO2 BMI Smoking Status 94% 43.11 kg/m2 Current Every Day Smoker Vitals History BMI and BSA Data Body Mass Index Body Surface Area 43.11 kg/m 2 2.88 m 2 Preferred Pharmacy Pharmacy Name Phone [facility] [facility]  [place] [phone] Your Updated Medication List  
  
   
This list is accurate as of: 2/1/18  8:35 AM.  Always use your most recent med list.  
  
  
  
  
 albuterol 90 mcg/actuation inhaler Commonly known as:  PROVENTIL HFA, VENTOLIN HFA, PROAIR HFA Take 1-2 Puffs by inhalation every four (4) hours as needed for Wheezing. aspirin delayed-release 81 mg tablet Take 1 Tab by mouth daily. enalapril-hydroCHLOROthiazide 10-25 mg tablet Commonly known as:  VASERETIC  
TAKE 2 TABLETS BY MOUTH DAILY  
  
 insulin aspart 100 unit/mL Inpn Commonly known as:  Britt Perone 15 units before meals  Indications: type 2 diabetes mellitus  
  
 insulin detemir 100 unit/mL (3 mL) Inpn Commonly known as:  Indira Right INJECT 65 UNITS UNDER THE SKIN DAILY Insulin Needles (Disposable) 31 gauge x 5/16\" Ndle Use with insulin 4 times a day  
  
 rosuvastatin 40 mg tablet Commonly known as:  CRESTOR SITagliptin-metFORMIN 50-1,000 mg per tablet Commonly known as:  JANUMET  
TAKE 1 TABLET BY MOUTH TWICE DAILY WITH MEALS  
  
 VIAGRA 100 mg tablet Generic drug:  sildenafil citrate TAKE 1 TABLET BY MOUTH EVERY DAY AS NEEDED AS DIRECTED Patient Instructions 1. Please remember to schedule with the endocrinologist for assistance in managing your diabetes 2. The diabetes education center reached out to you, please call them back Introducing \[facility]\"" & HEALTH SERVICES! Nicolle Gutierrez introduces [employer] patient portal. Now you can access parts of your medical record, email your doctor's office, and request medication refills online. 1. In your internet browser, go to [email or web address]. [employer] 2. Click on the First Time User? Click Here link in the Sign In box.  You will see the New Member Sign Up page. 3. Enter your [employer] Access Code exactly as it appears below. You will not need to use this code after youve completed the sign-up process. If you do not sign up before the expiration date, you must request a new code. · [employer] Access Code: [email or web address] Expires: 4/19/2018 10:42 AM 
 
4. Enter the last four digits of your Social Security Number (xxxx) and Date of Birth (mm/dd/yyyy) as indicated and click Submit. You will be taken to the next sign-up page. 5. Create a [employer]Forward[employer] ID. This will be your G-cluster login ID and cannot be changed, so think of one that is secure and easy to remember. 6. Create a [employer] password. You can change your password at any time. 7. Enter your Password Reset Question and Answer. This can be used at a later time if you forget your password. 8. Enter your e-mail address. You will receive e-mail notification when new information is available in [address] 9. Click Sign Up. You can now view and download portions of your medical record. 10. Click the Download Summary menu link to download a portable copy of your medical information. If you have questions, please visit the Frequently Asked Questions section of the [employer] website. Remember, [employer] is NOT to be used for urgent needs. For medical emergencies, dial 911. Now available from your iPhone and Android! Please provide this summary of care documentation to your next provider. Your primary care clinician is listed as Chico Sevilla. If you have any questions after today's visit, please call [phone].

## 2018-02-01 NOTE — PATIENT INSTRUCTIONS
1. Please remember to schedule with the endocrinologist for assistance in managing your diabetes  2.  The diabetes education center reached out to you, please call them back

## 2018-02-01 NOTE — PROGRESS NOTES
Pt here for   Chief Complaint   Patient presents with    Medication Evaluation     Pt insurance does not cover daibetes meds     1. Have you been to the ER, urgent care clinic since your last visit? Hospitalized since your last visit? No    2. Have you seen or consulted any other health care providers outside of the [address] [place] [address] since your last visit? Include any pap smears or colon screening.  No       Pt denies pain at this time      PHQ over the last two weeks 2/1/2018   PHQ Not Done -   Little interest or pleasure in doing things Not at all   Feeling down, depressed or hopeless Not at all   Total Score PHQ 2 0

## 2018-02-01 NOTE — PROGRESS NOTES
Subjective: (As above and below)     Chief Complaint   Patient presents with    Medication Evaluation     Pt insurance does not cover daibetes meds     Gonzalo Montelongo is a 52y.o. year old male who presents for problem with diabetes meds. He states that his new insurance will not longer cover levamir or novolog. He states it will cover his bp meds and statin but all meds were on hold? From pharmacy while insurance as being sorted so he has not had bp meds in 3 days. He states that he has been checking sugars but did not bring log, states they have been 'okay' he is vague about this. .. He has not yet scheduled with endo or DM ed        Reviewed PmHx, RxHx, FmHx, SocHx, AllgHx and updated in chart.   Family History   Problem Relation Age of Onset    Diabetes Mother      cancer, liver? hep c    Hypertension Mother     Hypertension Father     Diabetes Brother     Hypertension Brother     Hypertension Maternal Uncle     Diabetes Maternal Grandmother     Diabetes Paternal Grandmother     Hypertension Maternal Uncle     Hypertension Maternal Uncle     Diabetes Paternal Uncle        Past Medical History:   Diagnosis Date    Asthma     Diabetes ([facility] [address]     HTN (hypertension) 3/30/2010    Hypertension     Other and unspecified hyperlipidemia 3/30/2010    Sleep apnea     Type II or unspecified type diabetes mellitus without mention of complication, uncontrolled 3/30/2010      Social History     Social History    Marital status: SINGLE     Spouse name: N/A    Number of children: N/A    Years of education: N/A     Social History Main Topics    Smoking status: Current Every Day Smoker     Packs/day: 0.50     Years: 22.00     Types: Cigars    Smokeless tobacco: Never Used      Comment: black and mild    Alcohol use 6.0 oz/week     10 Standard drinks or equivalent per week      Comment: rare    Drug use: No    Sexual activity: Yes     Partners: Female     Birth control/ protection: Condom     Other Topics Concern    None     Social History Narrative    6/6/17: works at [employer]          Current Outpatient Prescriptions   Medication Sig    rosuvastatin (CRESTOR) 40 mg tablet     insulin lispro (HUMALOG) 100 unit/mL kwikpen 15 Units by SubCUTAneous route Before breakfast, lunch, and dinner.  insulin glargine 300 unit/mL (1.5 mL) inpn 60 Units by SubCUTAneous route daily.  VIAGRA 100 mg tablet TAKE 1 TABLET BY MOUTH EVERY DAY AS NEEDED AS DIRECTED    SITagliptin-metFORMIN (JANUMET) 50-1,000 mg per tablet TAKE 1 TABLET BY MOUTH TWICE DAILY WITH MEALS    enalapril-hydroCHLOROthiazide (VASERETIC) 10-25 mg tablet TAKE 2 TABLETS BY MOUTH DAILY    Insulin Needles, Disposable, 31 gauge x 5/16\" ndle Use with insulin 4 times a day    albuterol (PROVENTIL HFA, VENTOLIN HFA, PROAIR HFA) 90 mcg/actuation inhaler Take 1-2 Puffs by inhalation every four (4) hours as needed for Wheezing.  aspirin delayed-release 81 mg tablet Take 1 Tab by mouth daily. No current facility-administered medications for this visit. Review of Systems:   Constitutional:    Negative for fever and chills, negative diaphoresis. HEENT:              Negative for neck pain and stiffness. Eyes:                  Negative for visual disturbance, itching, redness or discharge. Respiratory:        Negative for cough and shortness of breath. Cardiovascular:  Negative for chest pain and palpitations. Gastrointestinal: Negative for nausea, vomiting, abdominal pain, diarrhea or constipation. Genitourinary:     Negative for dysuria and frequency. Musculoskeletal: Negative for falls, tenderness and swelling. Skin:                    Negative for rash, masses or lesions. Neurological:       Negative for dizzyness, seizure, loss of consciousness, weakness and numbness.      Objective:     Vitals:    02/01/18 0812 02/01/18 0816   BP: (!) 158/104 (!) 156/95   Pulse: 91 85   Resp: 18    Temp: 96.8 °F (36 °C)    TempSrc: Oral SpO2: 94%    Weight: 344 lb 14.4 oz (156.4 kg)    Height: 6' 3\" (1.905 m)        Results for orders placed or performed in visit on 01/19/18   AMB POC HEMOGLOBIN A1C   Result Value Ref Range    Hemoglobin A1c (POC) 10.5 %   AMB POC GLUCOSE BLOOD, BY GLUCOSE MONITORING DEVICE   Result Value Ref Range    Glucose  mg/dL         Physical Examination: General appearance - alert, well appearing, and in no distress  Chest - clear to auscultation, no wheezes, rales or rhonchi, symmetric air entry  Heart - normal rate, regular rhythm, normal S1, S2, no murmurs, rubs, clicks or gallops      Assessment/ Plan:   Follow-up Disposition: Not on File - already scheduled    Swap novolog for humalog and glargine for lantus, hopefully this will be covered  Reprinted labs  Encouraged f/u with DM ed/endo  Start bp meds back asap    1. Uncontrolled type 2 diabetes mellitus with hyperglycemia, with long-term current use of insulin (HCC)    - insulin lispro (HUMALOG) 100 unit/mL kwikpen; 15 Units by SubCUTAneous route Before breakfast, lunch, and dinner. Dispense: 1 Package; Refill: 11  - insulin glargine 300 unit/mL (1.5 mL) inpn; 60 Units by SubCUTAneous route daily. Dispense: 5 Pen; Refill: 11          I have discussed the diagnosis with the patient and the intended plan as seen in the above orders. The patient has received an after-visit summary and questions were answered concerning future plans. Pt conveyed understanding of plan. Medication Side Effects and Warnings were discussed with patient: yes  Patient Labs were reviewed: yes  Patient Past Records were reviewed:  yes    Loyda Myles NP

## 2018-03-31 DIAGNOSIS — I10 ESSENTIAL HYPERTENSION: ICD-10-CM

## 2018-04-03 RX ORDER — ENALAPRIL MALEATE AND HYDROCHLOROTHIAZIDE 10; 25 MG/1; MG/1
TABLET ORAL
Qty: 180 TAB | Refills: 0 | Status: SHIPPED | OUTPATIENT
Start: 2018-04-03 | End: 2018-07-23 | Stop reason: SDUPTHER

## 2018-04-03 RX ORDER — SILDENAFIL 100 MG/1
TABLET, FILM COATED ORAL
Qty: 9 TAB | Refills: 0 | Status: SHIPPED | OUTPATIENT
Start: 2018-04-03 | End: 2018-04-19 | Stop reason: SDUPTHER

## 2018-04-19 ENCOUNTER — OFFICE VISIT (OUTPATIENT)
Dept: INTERNAL MEDICINE CLINIC | Age: 48
End: 2018-04-19

## 2018-04-19 VITALS
HEART RATE: 85 BPM | DIASTOLIC BLOOD PRESSURE: 78 MMHG | RESPIRATION RATE: 18 BRPM | OXYGEN SATURATION: 95 % | HEIGHT: 75 IN | SYSTOLIC BLOOD PRESSURE: 131 MMHG | WEIGHT: 315 LBS | BODY MASS INDEX: 39.17 KG/M2 | TEMPERATURE: 98.7 F

## 2018-04-19 DIAGNOSIS — E11.65 UNCONTROLLED TYPE 2 DIABETES MELLITUS WITH HYPERGLYCEMIA, WITH LONG-TERM CURRENT USE OF INSULIN (HCC): ICD-10-CM

## 2018-04-19 DIAGNOSIS — Z00.00 ROUTINE PHYSICAL EXAMINATION: Primary | ICD-10-CM

## 2018-04-19 DIAGNOSIS — Z79.4 UNCONTROLLED TYPE 2 DIABETES MELLITUS WITH HYPERGLYCEMIA, WITH LONG-TERM CURRENT USE OF INSULIN (HCC): ICD-10-CM

## 2018-04-19 DIAGNOSIS — N52.1 ERECTILE DYSFUNCTION DUE TO DISEASES CLASSIFIED ELSEWHERE: ICD-10-CM

## 2018-04-19 PROBLEM — E11.21 TYPE 2 DIABETES WITH NEPHROPATHY (HCC): Status: ACTIVE | Noted: 2018-04-19

## 2018-04-19 LAB
GLUCOSE POC: 150 MG/DL
HBA1C MFR BLD HPLC: 8.9 %

## 2018-04-19 RX ORDER — LANCETS
EACH MISCELLANEOUS
Qty: 1 EACH | Refills: 11 | Status: SHIPPED | OUTPATIENT
Start: 2018-04-19 | End: 2022-02-17 | Stop reason: SDUPTHER

## 2018-04-19 RX ORDER — SILDENAFIL 100 MG/1
100 TABLET, FILM COATED ORAL AS NEEDED
Qty: 12 TAB | Refills: 1 | Status: SHIPPED | OUTPATIENT
Start: 2018-04-19 | End: 2019-03-28 | Stop reason: SDUPTHER

## 2018-04-19 RX ORDER — INSULIN DETEMIR 100 [IU]/ML
INJECTION, SOLUTION SUBCUTANEOUS
COMMUNITY
Start: 2018-04-12 | End: 2018-04-19

## 2018-04-19 RX ORDER — INSULIN PUMP SYRINGE, 3 ML
EACH MISCELLANEOUS
Qty: 1 KIT | Refills: 0 | Status: SHIPPED | OUTPATIENT
Start: 2018-04-19 | End: 2022-02-17 | Stop reason: SDUPTHER

## 2018-04-19 NOTE — PROGRESS NOTES
Pt here for   Chief Complaint   Patient presents with    Follow-up     3 month recheck    Diabetes    Cholesterol Problem     1. Have you been to the ER, urgent care clinic since your last visit? Hospitalized since your last visit? No    2. Have you seen or consulted any other health care providers outside of the [facility] since your last visit? Include any pap smears or colon screening.  No       Pt denies pain at this time      PHQ over the last two weeks 4/19/2018   PHQ Not Done -   Little interest or pleasure in doing things Not at all   Feeling down, depressed or hopeless Not at all   Total Score PHQ 2 0

## 2018-04-19 NOTE — MR AVS SNAPSHOT
[address] [facility] [address] [address] 
[phone] Patient: Bibi Moeller MRN: [id number] Z:[id number]/8/[id number] Visit Information Date & Time Provider Department Dept. Phone Encounter #  
 4/19/2018  8:40 AM Lesa Popliu, [facility] [address] [id number] Follow-up Instructions Return in about 3 months (around 7/19/2018) for dm. Your Appointments 5/16/2018  9:20 AM  
New Patient with Lisa Buck MD  
[address] [facility] [address] [facility] Appt Note: NP last seen 2015- using CPAP- [employer] [address] [facility] [address] [address] [address] [facility], [address] [address] Upcoming Health Maintenance Date Due DTaP/Tdap/Td series (1 - Tdap) 5/6/1991 EYE EXAM RETINAL OR DILATED Q1 2/10/2016 FOOT EXAM Q1 6/6/2018 MICROALBUMIN Q1 6/6/2018 HEMOGLOBIN A1C Q6M 7/19/2018 LIPID PANEL Q1 9/11/2018 Allergies as of 4/19/2018  Review Complete On: 4/19/2018 By: Ollie Marmolejo LPN Severity Noted Reaction Type Reactions Diazepam  08/17/2016    Other (comments) Caused hallucinations, paranoia Shellfish Derived  05/10/2015    Angioedema Current Immunizations  Never Reviewed Name Date Influenza Vaccine (Quad) PF 9/11/2017 Pneumococcal Polysaccharide (PPSV-23) 9/11/2017 Not reviewed this visit You Were Diagnosed With   
  
 Codes Comments Routine physical examination    -  Primary ICD-10-CM: Z00.00 ICD-9-CM: V70.0 Uncontrolled type 2 diabetes mellitus with hyperglycemia, with long-term current use of insulin (HCC)     ICD-10-CM: E11.65, Z79.4 ICD-9-CM: 250.02, V58.67 Vitals BP Pulse Temp Resp Height(growth percentile) Weight(growth percentile)  131/78 (BP 1 Location: Right arm, BP Patient Position: Sitting) 85 98.7 °F (37.1 °C) (Oral) 18 6' 3\" (1.905 m) (!) 353 lb 9.6 oz (160.4 kg) SpO2 BMI Smoking Status 95% 44.2 kg/m2 Current Every Day Smoker BMI and BSA Data Body Mass Index Body Surface Area  
 44.2 kg/m 2 2.91 m 2 Preferred Pharmacy Pharmacy Name Phone [facility] [address] [facility]  [place] [phone] Your Updated Medication List  
  
   
This list is accurate as of 4/19/18  9:19 AM.  Always use your most recent med list.  
  
  
  
  
 albuterol 90 mcg/actuation inhaler Commonly known as:  PROVENTIL HFA, VENTOLIN HFA, PROAIR HFA Take 1-2 Puffs by inhalation every four (4) hours as needed for Wheezing. aspirin delayed-release 81 mg tablet Take 1 Tab by mouth daily. Blood-Glucose Meter monitoring kit Check sugars TID. E11.65. Once touch  
  
 enalapril-hydroCHLOROthiazide 10-25 mg tablet Commonly known as:  VASERETIC  
TAKE 2 TABLETS BY MOUTH DAILY  
  
 glucose blood VI test strips strip Commonly known as:  ASCENSIA AUTODISC VI, ONE TOUCH ULTRA TEST VI  
E11.65. Check sugar TID  
  
 insulin glargine 300 unit/mL (1.5 mL) Inpn 60 Units by SubCUTAneous route daily. insulin lispro 100 unit/mL kwikpen Commonly known as:  HUMALOG  
15 Units by SubCUTAneous route Before breakfast, lunch, and dinner. Insulin Needles (Disposable) 31 gauge x 5/16\" Ndle Use with insulin 4 times a day Lancets Misc Use as directed. Dx: e11.65 check sugars TID  
  
 rosuvastatin 40 mg tablet Commonly known as:  CRESTOR  
  
 sildenafil citrate 100 mg tablet Commonly known as:  VIAGRA Take 1 Tab by mouth as needed. Once daily SITagliptin-metFORMIN 50-1,000 mg per tablet Commonly known as:  JANUMET  
TAKE 1 TABLET BY MOUTH TWICE DAILY WITH MEALS Prescriptions Sent to Pharmacy  Refills  
 sildenafil citrate (VIAGRA) 100 mg tablet 1  
 Sig: Take 1 Tab by mouth as needed. Once daily Class: Normal  
 Pharmacy: [facility] [address] Ph #: [phone] Route: Oral  
 Blood-Glucose Meter monitoring kit 0 Sig: Check sugars TID. E11.65. Once touch Class: Normal  
 Pharmacy: [facility] [address] Ph #: [phone] Lancets misc 11 Sig: Use as directed. Dx: e11.65 check sugars TID Class: Normal  
 Pharmacy: [facility] [address] AT [address] [place] [address] Road Ph #: [phone]  
 glucose blood VI test strips (ASCENSIA AUTODISC VI, ONE TOUCH ULTRA TEST VI) strip 11 Sig: E11.65. Check sugar TID Class: Normal  
 Pharmacy: [facility] [address] Ph #: [phone] We Performed the Following AMB POC GLUCOSE BLOOD, BY GLUCOSE MONITORING DEVICE [03788 CPT(R)] AMB POC HEMOGLOBIN A1C [98021 CPT(R)] HIV 1/2 AG/AB, 4TH GENERATION,W RFLX CONFIRM [id number] CPT(R)] METABOLIC PANEL, COMPREHENSIVE [27430 CPT(R)] Follow-up Instructions Return in about 3 months (around 7/19/2018) for dm. Patient Instructions Learning About Diabetes Food Guidelines Your Care Instructions Meal planning is important to manage diabetes. It helps keep your blood sugar at a target level (which you set with your doctor). You don't have to eat special foods. You can eat what your family eats, including sweets once in a while. But you do have to pay attention to how often you eat and how much you eat of certain foods. You may want to work with a dietitian or a certified diabetes educator (CDE) to help you plan meals and snacks. A dietitian or CDE can also help you lose weight if that is one of your goals. What should you know about eating carbs? Managing the amount of carbohydrate (carbs) you eat is an important part of healthy meals when you have diabetes. Carbohydrate is found in many foods. · Learn which foods have carbs. And learn the amounts of carbs in different foods. ¨ Bread, cereal, pasta, and rice have about 15 grams of carbs in a serving. A serving is 1 slice of bread (1 ounce), ½ cup of cooked cereal, or 1/3 cup of cooked pasta or rice. ¨ Fruits have 15 grams of carbs in a serving. A serving is 1 small fresh fruit, such as an apple or orange; ½ of a banana; ½ cup of cooked or canned fruit; ½ cup of fruit juice; 1 cup of melon or raspberries; or 2 tablespoons of dried fruit. ¨ Milk and no-sugar-added yogurt have 15 grams of carbs in a serving. A serving is 1 cup of milk or 2/3 cup of no-sugar-added yogurt. ¨ Starchy vegetables have 15 grams of carbs in a serving. A serving is ½ cup of mashed potatoes or sweet potato; 1 cup winter squash; ½ of a small baked potato; ½ cup of cooked beans; or ½ cup cooked corn or green peas. · Learn how much carbs to eat each day and at each meal. A dietitian or CDE can teach you how to keep track of the amount of carbs you eat. This is called carbohydrate counting. · If you are not sure how to count carbohydrate grams, use the Plate Method to plan meals. It is a good, quick way to make sure that you have a balanced meal. It also helps you spread carbs throughout the day. ¨ Divide your plate by types of foods. Put non-starchy vegetables on half the plate, meat or other protein food on one-quarter of the plate, and a grain or starchy vegetable in the final quarter of the plate.  To this you can add a small piece of fruit and 1 cup of milk or yogurt, depending on how many carbs you are supposed to eat at a meal. 
· Try to eat about the same amount of carbs at each meal. Do not \"save up\" your daily allowance of carbs to eat at one meal. 
 · Proteins have very little or no carbs per serving. Examples of proteins are beef, chicken, turkey, fish, eggs, tofu, cheese, cottage cheese, and peanut butter. A serving size of meat is 3 ounces, which is about the size of a deck of cards. Examples of meat substitute serving sizes (equal to 1 ounce of meat) are 1/4 cup of cottage cheese, 1 egg, 1 tablespoon of peanut butter, and ½ cup of tofu. How can you eat out and still eat healthy? · Learn to estimate the serving sizes of foods that have carbohydrate. If you measure food at home, it will be easier to estimate the amount in a serving of restaurant food. · If the meal you order has too much carbohydrate (such as potatoes, corn, or baked beans), ask to have a low-carbohydrate food instead. Ask for a salad or green vegetables. · If you use insulin, check your blood sugar before and after eating out to help you plan how much to eat in the future. · If you eat more carbohydrate at a meal than you had planned, take a walk or do other exercise. This will help lower your blood sugar. What else should you know? · Limit saturated fat, such as the fat from meat and dairy products. This is a healthy choice because people who have diabetes are at higher risk of heart disease. So choose lean cuts of meat and nonfat or low-fat dairy products. Use olive or canola oil instead of butter or shortening when cooking. · Don't skip meals. Your blood sugar may drop too low if you skip meals and take insulin or certain medicines for diabetes. · Check with your doctor before you drink alcohol. Alcohol can cause your blood sugar to drop too low. Alcohol can also cause a bad reaction if you take certain diabetes medicines. Follow-up care is a key part of your treatment and safety. Be sure to make and go to all appointments, and call your doctor if you are having problems. It's also a good idea to know your test results and keep a list of the medicines you take. Where can you learn more? Go to http://kimo-cherry.info/. Enter P294 in the search box to learn more about \"Learning About Diabetes Food Guidelines. \" Current as of: March 13, 2017 Content Version: 11.4 © 8715-4446 [employer]. Care instructions adapted under license by [employer] (which disclaims liability or warranty for this information). If you have questions about a medical condition or this instruction, always ask your healthcare professional. [place] [address] any warranty or liability for your use of this information. Leg and Ankle Edema: Care Instructions Your Care Instructions Swelling in the legs, ankles, and feet is called edema. It is common after you sit or stand for a while. Long plane flights or car rides often cause swelling in the legs and feet. You may also have swelling if you have to stand for long periods of time at your job. Problems with the veins in the legs (varicose veins) and changes in hormones can also cause swelling. Sometimes the swelling in the ankles and feet is caused by a more serious problem, such as heart failure, infection, blood clots, or liver or kidney disease. Follow-up care is a key part of your treatment and safety. Be sure to make and go to all appointments, and call your doctor if you are having problems. It's also a good idea to know your test results and keep a list of the medicines you take. How can you care for yourself at home? · If your doctor gave you medicine, take it as prescribed. Call your doctor if you think you are having a problem with your medicine. · Whenever you are resting, raise your legs up. Try to keep the swollen area higher than the level of your heart. · Take breaks from standing or sitting in one position. ¨ Walk around to increase the blood flow in your lower legs. ¨ Move your feet and ankles often while you stand, or tighten and relax your leg muscles. · Wear support stockings. Put them on in the morning, before swelling gets worse. · Eat a balanced diet. Lose weight if you need to. · Limit the amount of salt (sodium) in your diet. Salt holds fluid in the body and may increase swelling. When should you call for help? Call 911 anytime you think you may need emergency care. For example, call if: 
? · You have symptoms of a blood clot in your lung (called a pulmonary embolism). These may include: 
¨ Sudden chest pain. ¨ Trouble breathing. ¨ Coughing up blood. ?Call your doctor now or seek immediate medical care if: 
? · You have signs of a blood clot, such as: 
¨ Pain in your calf, back of the knee, thigh, or groin. ¨ Redness and swelling in your leg or groin. ? · You have symptoms of infection, such as: 
¨ Increased pain, swelling, warmth, or redness. ¨ Red streaks or pus. ¨ A fever. ? Watch closely for changes in your health, and be sure to contact your doctor if: 
? · Your swelling is getting worse. ? · You have new or worsening pain in your legs. ? · You do not get better as expected. Where can you learn more? Go to http://kimo-cherry.info/. Enter T467 in the search box to learn more about \"Leg and Ankle Edema: Care Instructions. \" Current as of: March 20, 2017 Content Version: 11.4 © 9422-1001 Rooftop [employer]. Care instructions adapted under license by [employer] (which disclaims liability or warranty for this information). If you have questions about a medical condition or this instruction, always ask your healthcare professional. [place] [address] any warranty or liability for your use of this information. Introducing hospitals & HEALTH SERVICES! Dianne Pink introduces Wetradetogether patient portal. Now you can access parts of your medical record, email your doctor's office, and request medication refills online.    
 
1. In your internet browser, go to [email or web address] 2. Click on the First Time User? Click Here link in the Sign In box. You will see the New Member Sign Up page. 3. Enter your Fantrotter Access Code exactly as it appears below. You will not need to use this code after youve completed the sign-up process. If you do not sign up before the expiration date, you must request a new code. · Fantrotter Access Code: [email or web address] Expires: 4/19/2018 11:42 AM 
 
4. Enter the last four digits of your Social Security Number (xxxx) and Date of Birth (mm/dd/yyyy) as indicated and click Submit. You will be taken to the next sign-up page. 5. Create a Linear Computer Solutionst ID. This will be your Fantrotter login ID and cannot be changed, so think of one that is secure and easy to remember. 6. Create a Fantrotter password. You can change your password at any time. 7. Enter your Password Reset Question and Answer. This can be used at a later time if you forget your password. 8. Enter your e-mail address. You will receive e-mail notification when new information is available in [address] 9. Click Sign Up. You can now view and download portions of your medical record. 10. Click the Download Summary menu link to download a portable copy of your medical information. If you have questions, please visit the Frequently Asked Questions section of the Fantrotter website. Remember, Fantrotter is NOT to be used for urgent needs. For medical emergencies, dial 911. Now available from your iPhone and Android! Please provide this summary of care documentation to your next provider. Your primary care clinician is listed as Radha Page. If you have any questions after today's visit, please call [phone].

## 2018-04-19 NOTE — PATIENT INSTRUCTIONS
Learning About Diabetes Food Guidelines  Your Care Instructions    Meal planning is important to manage diabetes. It helps keep your blood sugar at a target level (which you set with your doctor). You don't have to eat special foods. You can eat what your family eats, including sweets once in a while. But you do have to pay attention to how often you eat and how much you eat of certain foods. You may want to work with a dietitian or a certified diabetes educator (CDE) to help you plan meals and snacks. A dietitian or CDE can also help you lose weight if that is one of your goals. What should you know about eating carbs? Managing the amount of carbohydrate (carbs) you eat is an important part of healthy meals when you have diabetes. Carbohydrate is found in many foods. · Learn which foods have carbs. And learn the amounts of carbs in different foods. ¨ Bread, cereal, pasta, and rice have about 15 grams of carbs in a serving. A serving is 1 slice of bread (1 ounce), ½ cup of cooked cereal, or 1/3 cup of cooked pasta or rice. ¨ Fruits have 15 grams of carbs in a serving. A serving is 1 small fresh fruit, such as an apple or orange; ½ of a banana; ½ cup of cooked or canned fruit; ½ cup of fruit juice; 1 cup of melon or raspberries; or 2 tablespoons of dried fruit. ¨ Milk and no-sugar-added yogurt have 15 grams of carbs in a serving. A serving is 1 cup of milk or 2/3 cup of no-sugar-added yogurt. ¨ Starchy vegetables have 15 grams of carbs in a serving. A serving is ½ cup of mashed potatoes or sweet potato; 1 cup winter squash; ½ of a small baked potato; ½ cup of cooked beans; or ½ cup cooked corn or green peas. · Learn how much carbs to eat each day and at each meal. A dietitian or CDE can teach you how to keep track of the amount of carbs you eat. This is called carbohydrate counting. · If you are not sure how to count carbohydrate grams, use the Plate Method to plan meals.  It is a good, quick way to make sure that you have a balanced meal. It also helps you spread carbs throughout the day. ¨ Divide your plate by types of foods. Put non-starchy vegetables on half the plate, meat or other protein food on one-quarter of the plate, and a grain or starchy vegetable in the final quarter of the plate. To this you can add a small piece of fruit and 1 cup of milk or yogurt, depending on how many carbs you are supposed to eat at a meal.  · Try to eat about the same amount of carbs at each meal. Do not \"save up\" your daily allowance of carbs to eat at one meal.  · Proteins have very little or no carbs per serving. Examples of proteins are beef, chicken, turkey, fish, eggs, tofu, cheese, cottage cheese, and peanut butter. A serving size of meat is 3 ounces, which is about the size of a deck of cards. Examples of meat substitute serving sizes (equal to 1 ounce of meat) are 1/4 cup of cottage cheese, 1 egg, 1 tablespoon of peanut butter, and ½ cup of tofu. How can you eat out and still eat healthy? · Learn to estimate the serving sizes of foods that have carbohydrate. If you measure food at home, it will be easier to estimate the amount in a serving of restaurant food. · If the meal you order has too much carbohydrate (such as potatoes, corn, or baked beans), ask to have a low-carbohydrate food instead. Ask for a salad or green vegetables. · If you use insulin, check your blood sugar before and after eating out to help you plan how much to eat in the future. · If you eat more carbohydrate at a meal than you had planned, take a walk or do other exercise. This will help lower your blood sugar. What else should you know? · Limit saturated fat, such as the fat from meat and dairy products. This is a healthy choice because people who have diabetes are at higher risk of heart disease. So choose lean cuts of meat and nonfat or low-fat dairy products.  Use olive or canola oil instead of butter or shortening when cooking. · Don't skip meals. Your blood sugar may drop too low if you skip meals and take insulin or certain medicines for diabetes. · Check with your doctor before you drink alcohol. Alcohol can cause your blood sugar to drop too low. Alcohol can also cause a bad reaction if you take certain diabetes medicines. Follow-up care is a key part of your treatment and safety. Be sure to make and go to all appointments, and call your doctor if you are having problems. It's also a good idea to know your test results and keep a list of the medicines you take. Where can you learn more? Go to http://kimoL2 Environmental Servicescherry.info/. Enter F692 in the search box to learn more about \"Learning About Diabetes Food Guidelines. \"  Current as of: March 13, 2017  Content Version: 11.4  © 6207-7918 Faves. Care instructions adapted under license by [employer] (which disclaims liability or warranty for this information). If you have questions about a medical condition or this instruction, always ask your healthcare professional. [place] [address] any warranty or liability for your use of this information. Leg and Ankle Edema: Care Instructions  Your Care Instructions  Swelling in the legs, ankles, and feet is called edema. It is common after you sit or stand for a while. Long plane flights or car rides often cause swelling in the legs and feet. You may also have swelling if you have to stand for long periods of time at your job. Problems with the veins in the legs (varicose veins) and changes in hormones can also cause swelling. Sometimes the swelling in the ankles and feet is caused by a more serious problem, such as heart failure, infection, blood clots, or liver or kidney disease. Follow-up care is a key part of your treatment and safety. Be sure to make and go to all appointments, and call your doctor if you are having problems.  It's also a good idea to know your test results and keep a list of the medicines you take. How can you care for yourself at home? · If your doctor gave you medicine, take it as prescribed. Call your doctor if you think you are having a problem with your medicine. · Whenever you are resting, raise your legs up. Try to keep the swollen area higher than the level of your heart. · Take breaks from standing or sitting in one position. ¨ Walk around to increase the blood flow in your lower legs. ¨ Move your feet and ankles often while you stand, or tighten and relax your leg muscles. · Wear support stockings. Put them on in the morning, before swelling gets worse. · Eat a balanced diet. Lose weight if you need to. · Limit the amount of salt (sodium) in your diet. Salt holds fluid in the body and may increase swelling. When should you call for help? Call 911 anytime you think you may need emergency care. For example, call if:  ? · You have symptoms of a blood clot in your lung (called a pulmonary embolism). These may include:  ¨ Sudden chest pain. ¨ Trouble breathing. ¨ Coughing up blood. ?Call your doctor now or seek immediate medical care if:  ? · You have signs of a blood clot, such as:  ¨ Pain in your calf, back of the knee, thigh, or groin. ¨ Redness and swelling in your leg or groin. ? · You have symptoms of infection, such as:  ¨ Increased pain, swelling, warmth, or redness. ¨ Red streaks or pus. ¨ A fever. ? Watch closely for changes in your health, and be sure to contact your doctor if:  ? · Your swelling is getting worse. ? · You have new or worsening pain in your legs. ? · You do not get better as expected. Where can you learn more? Go to http://kimo-cherry.info/. Enter G209 in the search box to learn more about \"Leg and Ankle Edema: Care Instructions. \"  Current as of: March 20, 2017  Content Version: 11.4  © 3186-4262 Healthwise, [employer] Safari.  Care instructions adapted under license by [employer] (which disclaims liability or warranty for this information). If you have questions about a medical condition or this instruction, always ask your healthcare professional. [address] any warranty or liability for your use of this information.

## 2018-04-20 LAB
ALBUMIN SERPL-MCNC: 4.2 G/DL (ref 3.5–5.5)
ALBUMIN/GLOB SERPL: 1.7 {RATIO} (ref 1.2–2.2)
ALP SERPL-CCNC: 67 IU/L (ref 39–117)
ALT SERPL-CCNC: 11 IU/L (ref 0–44)
AST SERPL-CCNC: 17 IU/L (ref 0–40)
BILIRUB SERPL-MCNC: 0.2 MG/DL (ref 0–1.2)
BUN SERPL-MCNC: 10 MG/DL (ref 6–24)
BUN/CREAT SERPL: 12 (ref 9–20)
CALCIUM SERPL-MCNC: 9.3 MG/DL (ref 8.7–10.2)
CHLORIDE SERPL-SCNC: 100 MMOL/L (ref 96–106)
CO2 SERPL-SCNC: 24 MMOL/L (ref 18–29)
CREAT SERPL-MCNC: 0.82 MG/DL (ref 0.76–1.27)
GFR SERPLBLD CREATININE-BSD FMLA CKD-EPI: 105 ML/MIN/1.73
GFR SERPLBLD CREATININE-BSD FMLA CKD-EPI: 122 ML/MIN/1.73
GLOBULIN SER CALC-MCNC: 2.5 G/DL (ref 1.5–4.5)
GLUCOSE SERPL-MCNC: 138 MG/DL (ref 65–99)
HIV 1+2 AB+HIV1 P24 AG SERPL QL IA: NON REACTIVE
POTASSIUM SERPL-SCNC: 4.1 MMOL/L (ref 3.5–5.2)
PROT SERPL-MCNC: 6.7 G/DL (ref 6–8.5)
SODIUM SERPL-SCNC: 139 MMOL/L (ref 134–144)

## 2018-05-10 DIAGNOSIS — Z79.4 UNCONTROLLED TYPE 2 DIABETES MELLITUS WITH HYPERGLYCEMIA, WITH LONG-TERM CURRENT USE OF INSULIN (HCC): ICD-10-CM

## 2018-05-10 DIAGNOSIS — E11.65 UNCONTROLLED TYPE 2 DIABETES MELLITUS WITH HYPERGLYCEMIA, WITH LONG-TERM CURRENT USE OF INSULIN (HCC): ICD-10-CM

## 2018-05-10 RX ORDER — INSULIN LISPRO 100 [IU]/ML
INJECTION, SOLUTION INTRAVENOUS; SUBCUTANEOUS
Qty: 15 ML | Refills: 0 | Status: SHIPPED | OUTPATIENT
Start: 2018-05-10 | End: 2018-12-17

## 2018-05-10 RX ORDER — SITAGLIPTIN AND METFORMIN HYDROCHLORIDE 50; 1000 MG/1; MG/1
TABLET, FILM COATED ORAL
Qty: 180 TAB | Refills: 0 | Status: SHIPPED | OUTPATIENT
Start: 2018-05-10 | End: 2018-10-12 | Stop reason: SDUPTHER

## 2018-07-06 RX ORDER — INSULIN DETEMIR 100 [IU]/ML
INJECTION, SOLUTION SUBCUTANEOUS
Qty: 15 ML | Refills: 0 | Status: SHIPPED | OUTPATIENT
Start: 2018-07-06 | End: 2018-08-07 | Stop reason: SDUPTHER

## 2018-07-23 DIAGNOSIS — I10 ESSENTIAL HYPERTENSION: ICD-10-CM

## 2018-07-23 RX ORDER — ENALAPRIL MALEATE AND HYDROCHLOROTHIAZIDE 10; 25 MG/1; MG/1
TABLET ORAL
Qty: 180 TAB | Refills: 0 | Status: SHIPPED | OUTPATIENT
Start: 2018-07-23 | End: 2018-11-25 | Stop reason: SDUPTHER

## 2018-08-07 ENCOUNTER — OFFICE VISIT (OUTPATIENT)
Dept: INTERNAL MEDICINE CLINIC | Age: 48
End: 2018-08-07

## 2018-08-07 VITALS
TEMPERATURE: 97.7 F | OXYGEN SATURATION: 95 % | SYSTOLIC BLOOD PRESSURE: 144 MMHG | HEART RATE: 94 BPM | BODY MASS INDEX: 39.17 KG/M2 | DIASTOLIC BLOOD PRESSURE: 76 MMHG | RESPIRATION RATE: 18 BRPM | WEIGHT: 315 LBS | HEIGHT: 75 IN

## 2018-08-07 DIAGNOSIS — Z12.5 PROSTATE CANCER SCREENING: ICD-10-CM

## 2018-08-07 DIAGNOSIS — E11.21 TYPE 2 DIABETES WITH NEPHROPATHY (HCC): Primary | ICD-10-CM

## 2018-08-07 LAB
ALBUMIN UR QL STRIP: 30 MG/L
CREATININE, URINE POC: 200 MG/DL
HBA1C MFR BLD HPLC: 9.7 %
MICROALBUMIN/CREAT RATIO POC: <30 MG/G

## 2018-08-07 NOTE — PATIENT INSTRUCTIONS

## 2018-08-07 NOTE — PROGRESS NOTES
Subjective: (As above and below)     Chief Complaint   Patient presents with   Gini Edwards Diabetes    Hypertension    Cholesterol Problem     Daryle Doyne is a 50y.o. year old male who presents for     porfirio Youngblood    Diabetic Review of Systems - medication compliance: compliant all of the time, diabetic diet compliance: his brother recently passed and for the past week or so he admits to eating a lot of food that is very bad for him, home glucose monitoring: is performed regularly. Prior to his brothers  he was getting values less than 120 - the past 1-2 weeks he has been getting sugars in the 300's    He states that gave all the food away that was brought to his house and is motivated to get back on trace. He was previously working out w/ a  that is provided thru his job and was doing well. While he was exercising - no cp/storm. He plans to resuming this and diet. Wt Readings from Last 3 Encounters:   18 348 lb 1.6 oz (157.9 kg)   18 (!) 353 lb 9.6 oz (160.4 kg)   18 344 lb 14.4 oz (156.4 kg)     BP Readings from Last 3 Encounters:   18 144/76   18 131/78   18 (!) 156/95       Reviewed PmHx, RxHx, FmHx, SocHx, AllgHx and updated in chart.   Family History   Problem Relation Age of Onset    Diabetes Mother      cancer, liver? hep c    Hypertension Mother     Hypertension Father     Diabetes Brother     Hypertension Brother     Hypertension Maternal Uncle     Diabetes Maternal Grandmother     Diabetes Paternal Grandmother     Hypertension Maternal Uncle     Hypertension Maternal Uncle     Diabetes Paternal Uncle        Past Medical History:   Diagnosis Date    Asthma     Diabetes ([facility] [address]     HTN (hypertension) 3/30/2010    Hypertension     Other and unspecified hyperlipidemia 3/30/2010    Sleep apnea     Type II or unspecified type diabetes mellitus without mention of complication, uncontrolled 3/30/2010      Social History     Social History  Marital status: SINGLE     Spouse name: N/A    Number of children: N/A    Years of education: N/A     Social History Main Topics    Smoking status: Current Some Day Smoker     Packs/day: 0.50     Years: 22.00     Types: Cigars    Smokeless tobacco: Never Used      Comment: black and mild    Alcohol use 6.0 oz/week     10 Standard drinks or equivalent per week      Comment: rare    Drug use: No    Sexual activity: Yes     Partners: Female     Birth control/ protection: Condom     Other Topics Concern    None     Social History Narrative    6/6/17: works at [employer]          Current Outpatient Prescriptions   Medication Sig    enalapril-hydroCHLOROthiazide (VASERETIC) 10-25 mg tablet TAKE 2 TABLETS BY MOUTH DAILY    rosuvastatin (CRESTOR) 40 mg tablet Take 1 Tab by mouth nightly.  JANUMET 50-1,000 mg per tablet TAKE 1 TABLET BY MOUTH TWICE DAILY WITH MEALS    HUMALOG KWIKPEN INSULIN 100 unit/mL kwikpen INJECT 15 UNITS SUBCUTANEOUS BEFORE BREAKFAST, LUNCH, AND DINNER    sildenafil citrate (VIAGRA) 100 mg tablet Take 1 Tab by mouth as needed. Once daily    Blood-Glucose Meter monitoring kit Check sugars TID. E11.65. Once touch    Lancets misc Use as directed. Dx: e11.65 check sugars TID    glucose blood VI test strips (ASCENSIA AUTODISC VI, ONE TOUCH ULTRA TEST VI) strip E11.65. Check sugar TID    insulin glargine 300 unit/mL (1.5 mL) inpn 60 Units by SubCUTAneous route daily.  Insulin Needles, Disposable, 31 gauge x 5/16\" ndle Use with insulin 4 times a day    albuterol (PROVENTIL HFA, VENTOLIN HFA, PROAIR HFA) 90 mcg/actuation inhaler Take 1-2 Puffs by inhalation every four (4) hours as needed for Wheezing.  aspirin delayed-release 81 mg tablet Take 1 Tab by mouth daily. No current facility-administered medications for this visit. Review of Systems:   Constitutional:    Negative for fever and chills, negative diaphoresis.    HEENT:              Negative for neck pain and stiffness. Eyes:                  Negative for visual disturbance, itching, redness or discharge. Respiratory:        Negative for cough and shortness of breath. Cardiovascular:  Negative for chest pain and palpitations. Gastrointestinal: Negative for nausea, vomiting, abdominal pain, diarrhea or constipation. Genitourinary:     Negative for dysuria and frequency. Musculoskeletal: Negative for falls, tenderness and swelling. Skin:                    Negative for rash, masses or lesions. Neurological:       Negative for dizzyness, seizure, loss of consciousness, weakness and numbness. Objective:     Vitals:    08/07/18 0946   BP: 144/76   Pulse: 94   Resp: 18   Temp: 97.7 °F (36.5 °C)   TempSrc: Oral   SpO2: 95%   Weight: 348 lb 1.6 oz (157.9 kg)   Height: 6' 3\" (1.905 m)       Results for orders placed or performed in visit on 08/07/18   AMB POC URINE, MICROALBUMIN, SEMIQUANT (3 RESULTS)   Result Value Ref Range    ALBUMIN, URINE POC 30 Negative mg/L    CREATININE, URINE  mg/dL    Microalbumin/creat ratio (POC) <30 <30 MG/G   AMB POC HEMOGLOBIN A1C   Result Value Ref Range    Hemoglobin A1c (POC) 9.7 %         Physical Examination: General appearance - alert, well appearing, and in no distress and overweight  Chest - clear to auscultation, no wheezes, rales or rhonchi, symmetric air entry  Heart - normal rate, regular rhythm, normal S1, S2, no murmurs, rubs, clicks or gallops  Extremities - no pedal edema noted      Diabetic foot exam:     Left Foot:   Visual Exam: normal    Pulse DP: 2+ (normal)   Filament test: normal sensation    Vibratory sensation: normal      Right Foot:   Visual Exam: normal    Pulse DP: 2+ (normal)   Filament test: normal sensation    Vibratory sensation: normal      Assessment/ Plan:   Follow-up Disposition:  Return in about 3 months (around 11/7/2018) for dm.     1. Type 2 diabetes with nephropathy (HCC)  A1c is up d/t recent diet discretions, was previously coming down nicely. He states that he is getting back on track w/ diet. Advised check fasting sugars once daily - if continue to be high can titrate up insulin  - AMB POC URINE, MICROALBUMIN, SEMIQUANT (3 RESULTS)  - AMB POC HEMOGLOBIN A1C  - HM DIABETES FOOT EXAM  - LIPID PANEL; Future      2. Prostate cancer screening  - PSA W/ REFLX FREE PSA        I have discussed the diagnosis with the patient and the intended plan as seen in the above orders. The patient has received an after-visit summary and questions were answered concerning future plans. Pt conveyed understanding of plan. Medication Side Effects and Warnings were discussed with patient: yes  Patient Labs were reviewed: yes  Patient Past Records were reviewed:  yes    Joe Alexis.  Adrian Nciole NP

## 2018-08-07 NOTE — MR AVS SNAPSHOT
[address] [facility] [address] [address] 
[phone] Patient: Daryle Doyne MRN: [id number] EWR:[birth year] Visit Information Date & Time Provider Department Dept. Phone Encounter #  
 8/7/2018  9:40 AM Lesa Stock [place], [address] [id number] Follow-up Instructions Return in about 3 months (around 11/7/2018) for dm. Upcoming Health Maintenance Date Due DTaP/Tdap/Td series (1 - Tdap) 5/6/1991 EYE EXAM RETINAL OR DILATED Q1 2/10/2016 FOOT EXAM Q1 6/6/2018 MICROALBUMIN Q1 6/6/2018 Influenza Age 5 to Adult 8/1/2018 LIPID PANEL Q1 9/11/2018 HEMOGLOBIN A1C Q6M 10/19/2018 Allergies as of 8/7/2018  Review Complete On: 8/7/2018 By: Beatriz Guillen LPN Severity Noted Reaction Type Reactions Diazepam  08/17/2016    Other (comments) Caused hallucinations, paranoia Shellfish Derived  05/10/2015    Angioedema Current Immunizations  Never Reviewed Name Date Influenza Vaccine (Quad) PF 9/11/2017 Pneumococcal Polysaccharide (PPSV-23) 9/11/2017 Not reviewed this visit You Were Diagnosed With   
  
 Codes Comments Type 2 diabetes with nephropathy (HCC)    -  Primary ICD-10-CM: E11.21 
ICD-9-CM: 250.40, 583.81 Prostate cancer screening     ICD-10-CM: Z12.5 ICD-9-CM: V76.44 Vitals BP Pulse Temp Resp Height(growth percentile) Weight(growth percentile) 144/76 (BP 1 Location: Left arm, BP Patient Position: Sitting) 94 97.7 °F (36.5 °C) (Oral) 18 6' 3\" (1.905 m) 348 lb 1.6 oz (157.9 kg) SpO2 BMI Smoking Status 95% 43.51 kg/m2 Current Some Day Smoker BMI and BSA Data Body Mass Index Body Surface Area  
 43.51 kg/m 2 2.89 m 2 Preferred Pharmacy Pharmacy Name Phone [facility] [email or web address]  [place] [phone] Your Updated Medication List  
  
   
This list is accurate as of 8/7/18 10:22 AM.  Always use your most recent med list.  
  
  
  
  
 albuterol 90 mcg/actuation inhaler Commonly known as:  PROVENTIL HFA, VENTOLIN HFA, PROAIR HFA Take 1-2 Puffs by inhalation every four (4) hours as needed for Wheezing. aspirin delayed-release 81 mg tablet Take 1 Tab by mouth daily. Blood-Glucose Meter monitoring kit Check sugars TID. E11.65. Once touch  
  
 enalapril-hydroCHLOROthiazide 10-25 mg tablet Commonly known as:  VASERETIC  
TAKE 2 TABLETS BY MOUTH DAILY  
  
 glucose blood VI test strips strip Commonly known as:  ASCENSIA AUTODISC VI, ONE TOUCH ULTRA TEST VI  
E11.65. Check sugar TID HumaLOG KwikPen Insulin 100 unit/mL kwikpen Generic drug:  insulin lispro INJECT 15 UNITS SUBCUTANEOUS BEFORE BREAKFAST, LUNCH, AND DINNER  
  
 insulin glargine 300 unit/mL (1.5 mL) Inpn 60 Units by SubCUTAneous route daily. Insulin Needles (Disposable) 31 gauge x 5/16\" Ndle Use with insulin 4 times a day JANUMET 50-1,000 mg per tablet Generic drug:  SITagliptin-metFORMIN  
TAKE 1 TABLET BY MOUTH TWICE DAILY WITH MEALS Lancets Misc Use as directed. Dx: e11.65 check sugars TID  
  
 rosuvastatin 40 mg tablet Commonly known as:  CRESTOR Take 1 Tab by mouth nightly. sildenafil citrate 100 mg tablet Commonly known as:  VIAGRA Take 1 Tab by mouth as needed. Once daily We Performed the Following AMB POC HEMOGLOBIN A1C [42855 CPT(R)] AMB POC URINE, MICROALBUMIN, SEMIQUANT (3 RESULTS) [15284 CPT(R)]  DIABETES FOOT EXAM [7 Custom] PSA W/ REFLX FREE PSA [33959 CPT(R)] Follow-up Instructions Return in about 3 months (around 11/7/2018) for dm. To-Do List   
 09/11/2018 Lab:  LIPID PANEL Patient Instructions Learning About Diabetes Food Guidelines Your Care Instructions Meal planning is important to manage diabetes. It helps keep your blood sugar at a target level (which you set with your doctor). You don't have to eat special foods. You can eat what your family eats, including sweets once in a while. But you do have to pay attention to how often you eat and how much you eat of certain foods. You may want to work with a dietitian or a certified diabetes educator (CDE) to help you plan meals and snacks. A dietitian or CDE can also help you lose weight if that is one of your goals. What should you know about eating carbs? Managing the amount of carbohydrate (carbs) you eat is an important part of healthy meals when you have diabetes. Carbohydrate is found in many foods. · Learn which foods have carbs. And learn the amounts of carbs in different foods. ¨ Bread, cereal, pasta, and rice have about 15 grams of carbs in a serving. A serving is 1 slice of bread (1 ounce), ½ cup of cooked cereal, or 1/3 cup of cooked pasta or rice. ¨ Fruits have 15 grams of carbs in a serving. A serving is 1 small fresh fruit, such as an apple or orange; ½ of a banana; ½ cup of cooked or canned fruit; ½ cup of fruit juice; 1 cup of melon or raspberries; or 2 tablespoons of dried fruit. ¨ Milk and no-sugar-added yogurt have 15 grams of carbs in a serving. A serving is 1 cup of milk or 2/3 cup of no-sugar-added yogurt. ¨ Starchy vegetables have 15 grams of carbs in a serving. A serving is ½ cup of mashed potatoes or sweet potato; 1 cup winter squash; ½ of a small baked potato; ½ cup of cooked beans; or ½ cup cooked corn or green peas. · Learn how much carbs to eat each day and at each meal. A dietitian or CDE can teach you how to keep track of the amount of carbs you eat. This is called carbohydrate counting. · If you are not sure how to count carbohydrate grams, use the Plate Method to plan meals.  It is a good, quick way to make sure that you have a balanced meal. It also helps you spread carbs throughout the day. ¨ Divide your plate by types of foods. Put non-starchy vegetables on half the plate, meat or other protein food on one-quarter of the plate, and a grain or starchy vegetable in the final quarter of the plate. To this you can add a small piece of fruit and 1 cup of milk or yogurt, depending on how many carbs you are supposed to eat at a meal. 
· Try to eat about the same amount of carbs at each meal. Do not \"save up\" your daily allowance of carbs to eat at one meal. 
· Proteins have very little or no carbs per serving. Examples of proteins are beef, chicken, turkey, fish, eggs, tofu, cheese, cottage cheese, and peanut butter. A serving size of meat is 3 ounces, which is about the size of a deck of cards. Examples of meat substitute serving sizes (equal to 1 ounce of meat) are 1/4 cup of cottage cheese, 1 egg, 1 tablespoon of peanut butter, and ½ cup of tofu. How can you eat out and still eat healthy? · Learn to estimate the serving sizes of foods that have carbohydrate. If you measure food at home, it will be easier to estimate the amount in a serving of restaurant food. · If the meal you order has too much carbohydrate (such as potatoes, corn, or baked beans), ask to have a low-carbohydrate food instead. Ask for a salad or green vegetables. · If you use insulin, check your blood sugar before and after eating out to help you plan how much to eat in the future. · If you eat more carbohydrate at a meal than you had planned, take a walk or do other exercise. This will help lower your blood sugar. What else should you know? · Limit saturated fat, such as the fat from meat and dairy products. This is a healthy choice because people who have diabetes are at higher risk of heart disease. So choose lean cuts of meat and nonfat or low-fat dairy products. Use olive or canola oil instead of butter or shortening when cooking. · Don't skip meals. Your blood sugar may drop too low if you skip meals and take insulin or certain medicines for diabetes. · Check with your doctor before you drink alcohol. Alcohol can cause your blood sugar to drop too low. Alcohol can also cause a bad reaction if you take certain diabetes medicines. Follow-up care is a key part of your treatment and safety. Be sure to make and go to all appointments, and call your doctor if you are having problems. It's also a good idea to know your test results and keep a list of the medicines you take. Where can you learn more? Go to http://kimo-cherry.info/. Enter B243 in the search box to learn more about \"Learning About Diabetes Food Guidelines. \" Current as of: December 7, 2017 Content Version: 11.7 © 9438-7915 [employer]. Care instructions adapted under license by [employer] (which disclaims liability or warranty for this information). If you have questions about a medical condition or this instruction, always ask your healthcare professional. [address] any warranty or liability for your use of this information. Introducing hospitals & HEALTH SERVICES! Felice Linda introduces Babble patient portal. Now you can access parts of your medical record, email your doctor's office, and request medication refills online. 1. In your internet browser, go to [email or web address] 2. Click on the First Time User? Click Here link in the Sign In box. You will see the New Member Sign Up page. 3. Enter your Babble Access Code exactly as it appears below. You will not need to use this code after youve completed the sign-up process. If you do not sign up before the expiration date, you must request a new code. · Babble Access Code: [email or web address] 
Expires: 11/5/2018 10:22 AM 
 
4.  Enter the last four digits of your Social Security Number (xxxx) and Date of Birth (mm/dd/yyyy) as indicated and click Submit. You will be taken to the next sign-up page. 5. Create a MeetingSprout ID. This will be your MeetingSprout login ID and cannot be changed, so think of one that is secure and easy to remember. 6. Create a MeetingSprout password. You can change your password at any time. 7. Enter your Password Reset Question and Answer. This can be used at a later time if you forget your password. 8. Enter your e-mail address. You will receive e-mail notification when new information is available in [address] 9. Click Sign Up. You can now view and download portions of your medical record. 10. Click the Download Summary menu link to download a portable copy of your medical information. If you have questions, please visit the Frequently Asked Questions section of the MeetingSprout website. Remember, MeetingSprout is NOT to be used for urgent needs. For medical emergencies, dial 911. Now available from your iPhone and Android! Please provide this summary of care documentation to your next provider. Your primary care clinician is listed as Rylan Del Rio. If you have any questions after today's visit, please call [phone].

## 2018-08-07 NOTE — PROGRESS NOTES
Pt here for   Chief Complaint   Patient presents with    Follow-up    Diabetes    Hypertension    Cholesterol Problem     1. Have you been to the ER, urgent care clinic since your last visit? Hospitalized since your last visit? No    2. Have you seen or consulted any other health care providers outside of the [address] [place] [address] since your last visit? Include any pap smears or colon screening.  No       Pt denies pain at this time      PHQ over the last two weeks 8/7/2018   PHQ Not Done -   Little interest or pleasure in doing things Not at all   Feeling down, depressed, irritable, or hopeless Not at all   Total Score PHQ 2 0

## 2018-08-09 DIAGNOSIS — Z79.4 UNCONTROLLED TYPE 2 DIABETES MELLITUS WITH HYPERGLYCEMIA, WITH LONG-TERM CURRENT USE OF INSULIN (HCC): ICD-10-CM

## 2018-08-09 DIAGNOSIS — E11.65 UNCONTROLLED TYPE 2 DIABETES MELLITUS WITH HYPERGLYCEMIA, WITH LONG-TERM CURRENT USE OF INSULIN (HCC): ICD-10-CM

## 2018-08-10 DIAGNOSIS — E11.21 TYPE 2 DIABETES WITH NEPHROPATHY (HCC): Primary | ICD-10-CM

## 2018-08-13 ENCOUNTER — TELEPHONE (OUTPATIENT)
Dept: INTERNAL MEDICINE CLINIC | Age: 48
End: 2018-08-13

## 2018-08-13 NOTE — TELEPHONE ENCOUNTER
Called spoke to patient @ [phone] he stated he called the on call Doctor they refilled his medications and he is good now, Devang Zaman LPN

## 2018-08-13 NOTE — TELEPHONE ENCOUNTER
----- Message from Tanner Darby sent at 8/10/2018  6:25 PM EDT -----  Regarding: KATIA Mchugh/Refdarian  Pt is following up on a call earlier today for a refill on his Insulin.       Best contact # [phone]

## 2018-08-31 ENCOUNTER — TELEPHONE (OUTPATIENT)
Dept: FAMILY MEDICINE CLINIC | Age: 48
End: 2018-08-31

## 2018-08-31 NOTE — TELEPHONE ENCOUNTER
8-31-18 @ 4:09 pm  called @ [phone] no answer left message for patient to return call regarding this message.  Kenn Witt LPN

## 2018-08-31 NOTE — TELEPHONE ENCOUNTER
----- Message from Rashawn Hall. Ethel Sapp NP sent at 8/24/2018  4:12 PM EDT -----  When I'm out of town can you call him and make sure he got his insulin and see how his sugars are.    Thanks

## 2018-09-11 ENCOUNTER — OFFICE VISIT (OUTPATIENT)
Dept: INTERNAL MEDICINE CLINIC | Age: 48
End: 2018-09-11

## 2018-09-11 VITALS
SYSTOLIC BLOOD PRESSURE: 128 MMHG | HEIGHT: 75 IN | RESPIRATION RATE: 18 BRPM | OXYGEN SATURATION: 92 % | TEMPERATURE: 97 F | HEART RATE: 91 BPM | BODY MASS INDEX: 39.17 KG/M2 | DIASTOLIC BLOOD PRESSURE: 81 MMHG | WEIGHT: 315 LBS

## 2018-09-11 DIAGNOSIS — E11.21 TYPE 2 DIABETES WITH NEPHROPATHY (HCC): ICD-10-CM

## 2018-09-11 DIAGNOSIS — Z23 ENCOUNTER FOR IMMUNIZATION: ICD-10-CM

## 2018-09-11 DIAGNOSIS — M54.50 ACUTE LEFT-SIDED LOW BACK PAIN WITHOUT SCIATICA: Primary | ICD-10-CM

## 2018-09-11 RX ORDER — INSULIN DETEMIR 100 [IU]/ML
INJECTION, SOLUTION SUBCUTANEOUS
Refills: 0 | COMMUNITY
Start: 2018-08-10 | End: 2018-12-17 | Stop reason: SDUPTHER

## 2018-09-11 RX ORDER — CYCLOBENZAPRINE HCL 10 MG
10 TABLET ORAL
Qty: 20 TAB | Refills: 0 | Status: SHIPPED | OUTPATIENT
Start: 2018-09-11 | End: 2018-12-17 | Stop reason: ALTCHOICE

## 2018-09-11 NOTE — PROGRESS NOTES
Subjective: (As above and below)     Chief Complaint   Patient presents with   Ariane Luis is a 50y.o. year old male who presents for low back pain     Last Monday, he lifted his riding mower up and thinks this is what initiated pain. His pain is lower back L>R. Denies saddle numbness, leg weakness, falls or bowel/bladder dysfunction. Since onset pain is improving some but is still the worst in the morning. Pain is sharp/tight feeling. He has been using aleve which helps a little. He has tried heat which he thinks made it worse. Blood sugars have been in the 120's    Reviewed PmHx, RxHx, FmHx, SocHx, AllgHx and updated in chart.   Family History   Problem Relation Age of Onset    Diabetes Mother      cancer, liver? hep c    Hypertension Mother     Hypertension Father     Diabetes Brother     Hypertension Brother     Hypertension Maternal Uncle     Diabetes Maternal Grandmother     Diabetes Paternal Grandmother     Hypertension Maternal Uncle     Hypertension Maternal Uncle     Diabetes Paternal Uncle        Past Medical History:   Diagnosis Date    Asthma     Diabetes ([facility] [address]     HTN (hypertension) 3/30/2010    Hypertension     Other and unspecified hyperlipidemia 3/30/2010    Sleep apnea     Type II or unspecified type diabetes mellitus without mention of complication, uncontrolled 3/30/2010      Social History     Social History    Marital status: SINGLE     Spouse name: N/A    Number of children: N/A    Years of education: N/A     Social History Main Topics    Smoking status: Current Some Day Smoker     Packs/day: 0.50     Years: 22.00     Types: Cigars    Smokeless tobacco: Never Used      Comment: black and mild    Alcohol use 6.0 oz/week     10 Standard drinks or equivalent per week      Comment: rare    Drug use: No    Sexual activity: Yes     Partners: Female     Birth control/ protection: Condom     Other Topics Concern    None     Social History Narrative 6/6/17: works at [address] [place] [address] Prescriptions   Medication Sig    LEVEMIR FLEXTOUCH U-100 INSULN 100 unit/mL (3 mL) inpn ADM 65 UNI SC QD    cyclobenzaprine (FLEXERIL) 10 mg tablet Take 1 Tab by mouth three (3) times daily as needed for Muscle Spasm(s).  insulin glargine 300 unit/mL (1.5 mL) inpn 60 Units by SubCUTAneous route daily.  enalapril-hydroCHLOROthiazide (VASERETIC) 10-25 mg tablet TAKE 2 TABLETS BY MOUTH DAILY    rosuvastatin (CRESTOR) 40 mg tablet Take 1 Tab by mouth nightly.  JANUMET 50-1,000 mg per tablet TAKE 1 TABLET BY MOUTH TWICE DAILY WITH MEALS    HUMALOG KWIKPEN INSULIN 100 unit/mL kwikpen INJECT 15 UNITS SUBCUTANEOUS BEFORE BREAKFAST, LUNCH, AND DINNER    sildenafil citrate (VIAGRA) 100 mg tablet Take 1 Tab by mouth as needed. Once daily    Blood-Glucose Meter monitoring kit Check sugars TID. E11.65. Once touch    Lancets misc Use as directed. Dx: e11.65 check sugars TID    glucose blood VI test strips (ASCENSIA AUTODISC VI, ONE TOUCH ULTRA TEST VI) strip E11.65. Check sugar TID    Insulin Needles, Disposable, 31 gauge x 5/16\" ndle Use with insulin 4 times a day    albuterol (PROVENTIL HFA, VENTOLIN HFA, PROAIR HFA) 90 mcg/actuation inhaler Take 1-2 Puffs by inhalation every four (4) hours as needed for Wheezing.  aspirin delayed-release 81 mg tablet Take 1 Tab by mouth daily. No current facility-administered medications for this visit. Review of Systems:   Constitutional:    Negative for fever and chills, negative diaphoresis. Respiratory:        Negative for cough and shortness of breath. Cardiovascular:  Negative for chest pain and palpitations. Gastrointestinal: Negative for nausea, vomiting, abdominal pain, diarrhea or constipation. Genitourinary:     Negative for dysuria and frequency. Musculoskeletal: LBP  Neurological:       Negative for dizzyness, seizure, loss of consciousness, weakness and numbness. Objective:     Vitals:    09/11/18 0919   BP: 128/81   Pulse: 91   Resp: 18   Temp: 97 °F (36.1 °C)   TempSrc: Oral   SpO2: 92%   Weight: 348 lb 1.6 oz (157.9 kg)   Height: 6' 3\" (1.905 m)       Results for orders placed or performed in visit on 08/07/18   AMB POC URINE, MICROALBUMIN, SEMIQUANT (3 RESULTS)   Result Value Ref Range    ALBUMIN, URINE POC 30 Negative mg/L    CREATININE, URINE  mg/dL    Microalbumin/creat ratio (POC) <30 <30 MG/G   AMB POC HEMOGLOBIN A1C   Result Value Ref Range    Hemoglobin A1c (POC) 9.7 %         Physical Examination: General appearance - alert, well appearing, and in no distress and overweight  Chest - clear to auscultation, no wheezes, rales or rhonchi, symmetric air entry  Heart - normal rate, regular rhythm, normal S1, S2, no murmurs, rubs, clicks or gallops  Back exam - full range of motion, ttp to left lumbar paraspinals, gait normal    Assessment/ Plan:   Follow-up Disposition:  Return for as scheduled for DM. 1. Acute left-sided low back pain without sciatica  Reviewed NSAID use  - cyclobenzaprine (FLEXERIL) 10 mg tablet; Take 1 Tab by mouth three (3) times daily as needed for Muscle Spasm(s). Dispense: 20 Tab; Refill: 0    2. Encounter for immunization  - Influenza virus vaccine (QUADRIVALENT PRES FREE SYRINGE) IM ([id number])        I have discussed the diagnosis with the patient and the intended plan as seen in the above orders. The patient has received an after-visit summary and questions were answered concerning future plans. Pt conveyed understanding of plan. Medication Side Effects and Warnings were discussed with patient: yes  Patient Labs were reviewed: yes  Patient Past Records were reviewed:  yes    Nara Colin. Virginia Brar NP      Discussed the patient's BMI with him.   The BMI follow up plan is as follows:     dietary management education, guidance, and counseling  encourage exercise  monitor weight  prescribed dietary intake    An After Visit Summary was printed and given to the patient.

## 2018-09-11 NOTE — PATIENT INSTRUCTIONS
Back Stretches: Exercises  Your Care Instructions  Here are some examples of exercises for stretching your back. Start each exercise slowly. Ease off the exercise if you start to have pain. Your doctor or physical therapist will tell you when you can start these exercises and which ones will work best for you. How to do the exercises  Overhead stretch    1. Stand comfortably with your feet shoulder-width apart. 2. Looking straight ahead, raise both arms over your head and reach toward the ceiling. Do not allow your head to tilt back. 3. Hold for 15 to 30 seconds, then lower your arms to your sides. 4. Repeat 2 to 4 times. Side stretch    1. Stand comfortably with your feet shoulder-width apart. 2. Raise one arm over your head, and then lean to the other side. 3. Slide your hand down your leg as you let the weight of your arm gently stretch your side muscles. Hold for 15 to 30 seconds. 4. Repeat 2 to 4 times on each side. Press-up    1. Lie on your stomach, supporting your body with your forearms. 2. Press your elbows down into the floor to raise your upper back. As you do this, relax your stomach muscles and allow your back to arch without using your back muscles. As your press up, do not let your hips or pelvis come off the floor. 3. Hold for 15 to 30 seconds, then relax. 4. Repeat 2 to 4 times. Relax and rest    1. Lie on your back with a rolled towel under your neck and a pillow under your knees. Extend your arms comfortably to your sides. 2. Relax and breathe normally. 3. Remain in this position for about 10 minutes. 4. If you can, do this 2 or 3 times each day. Follow-up care is a key part of your treatment and safety. Be sure to make and go to all appointments, and call your doctor if you are having problems. It's also a good idea to know your test results and keep a list of the medicines you take. Where can you learn more? Go to http://kimo-cherry.info/.   Enter B498 in the search box to learn more about \"Back Stretches: Exercises. \"  Current as of: November 29, 2017  Content Version: 11.7  © 20068528-4499 [employer]. Care instructions adapted under license by [employer] (which disclaims liability or warranty for this information). If you have questions about a medical condition or this instruction, always ask your healthcare professional. [address] any warranty or liability for your use of this information. Body Mass Index: Care Instructions  Your Care Instructions    Body mass index (BMI) can help you see if your weight is raising your risk for health problems. It uses a formula to compare how much you weigh with how tall you are. · A BMI lower than 18.5 is considered underweight. · A BMI between 18.5 and 24.9 is considered healthy. · A BMI between 25 and 29.9 is considered overweight. A BMI of 30 or higher is considered obese. If your BMI is in the normal range, it means that you have a lower risk for weight-related health problems. If your BMI is in the overweight or obese range, you may be at increased risk for weight-related health problems, such as high blood pressure, heart disease, stroke, arthritis or joint pain, and diabetes. If your BMI is in the underweight range, you may be at increased risk for health problems such as fatigue, lower protection (immunity) against illness, muscle loss, bone loss, hair loss, and hormone problems. BMI is just one measure of your risk for weight-related health problems. You may be at higher risk for health problems if you are not active, you eat an unhealthy diet, or you drink too much alcohol or use tobacco products. Follow-up care is a key part of your treatment and safety. Be sure to make and go to all appointments, and call your doctor if you are having problems. It's also a good idea to know your test results and keep a list of the medicines you take.   How can you care for yourself at home? · Practice healthy eating habits. This includes eating plenty of fruits, vegetables, whole grains, lean protein, and low-fat dairy. · If your doctor recommends it, get more exercise. Walking is a good choice. Bit by bit, increase the amount you walk every day. Try for at least 30 minutes on most days of the week. · Do not smoke. Smoking can increase your risk for health problems. If you need help quitting, talk to your doctor about stop-smoking programs and medicines. These can increase your chances of quitting for good. · Limit alcohol to 2 drinks a day for men and 1 drink a day for women. Too much alcohol can cause health problems. If you have a BMI higher than 25  · Your doctor may do other tests to check your risk for weight-related health problems. This may include measuring the distance around your waist. A waist measurement of more than 40 inches in men or 35 inches in women can increase the risk of weight-related health problems. · Talk with your doctor about steps you can take to stay healthy or improve your health. You may need to make lifestyle changes to lose weight and stay healthy, such as changing your diet and getting regular exercise. If you have a BMI lower than 18.5  · Your doctor may do other tests to check your risk for health problems. · Talk with your doctor about steps you can take to stay healthy or improve your health. You may need to make lifestyle changes to gain or maintain weight and stay healthy, such as getting more healthy foods in your diet and doing exercises to build muscle. Where can you learn more? Go to http://kimo-cherry.info/. Enter S176 in the search box to learn more about \"Body Mass Index: Care Instructions. \"  Current as of: October 13, 2016  Content Version: 11.4  © 8377-5675 Healthwise[employer] Incorporated.  Care instructions adapted under license by [employer] (which disclaims liability or warranty for this information). If you have questions about a medical condition or this instruction, always ask your healthcare professional. [place] [address] any warranty or liability for your use of this information.

## 2018-09-11 NOTE — MR AVS SNAPSHOT
[facility] 
 
 
 [place] [address] [place] [address] 
[phone] Patient: Esthela Nurse MRN: [id number] KUB:[id number] Visit Information Date & Time Provider Department Dept. Phone Encounter #  
 9/11/2018  9:00 AM Lesa Linder, [address] [id number] Follow-up Instructions Return for as scheduled for DM. Your Appointments 11/9/2018  9:40 AM  
ROUTINE CARE with Lesa Linder NP  
[address] [facility]) Appt Note: 3 month follow up [place] [address] [place] [address]  
[phone]  
  
   
 [facility] [place] [address] [place] Upcoming Health Maintenance Date Due  
 EYE EXAM RETINAL OR DILATED Q1 2/10/2016 Influenza Age 5 to Adult 8/1/2018 LIPID PANEL Q1 9/11/2018 DTaP/Tdap/Td series (1 - Tdap) 9/27/2018* HEMOGLOBIN A1C Q6M 2/7/2019 FOOT EXAM Q1 8/7/2019 MICROALBUMIN Q1 8/7/2019 *Topic was postponed. The date shown is not the original due date. Allergies as of 9/11/2018  Review Complete On: 9/11/2018 By: Devin Linder NP Severity Noted Reaction Type Reactions Diazepam  08/17/2016    Other (comments) Caused hallucinations, paranoia Shellfish Derived  05/10/2015    Angioedema Current Immunizations  Never Reviewed Name Date Influenza Vaccine (Quad) PF 9/11/2017 Pneumococcal Polysaccharide (PPSV-23) 9/11/2017 Not reviewed this visit You Were Diagnosed With   
  
 Codes Comments Acute left-sided low back pain without sciatica    -  Primary ICD-10-CM: M54.5 ICD-9-CM: 724.2 Encounter for immunization     ICD-10-CM: C06 ICD-9-CM: V03.89 Vitals BP Pulse Temp Resp Height(growth percentile) Weight(growth percentile)  128/81 (BP 1 Location: Right arm, BP Patient Position: Sitting) 91 97 °F (36.1 °C) (Oral) 18 6' 3\" (1.905 m) 348 lb 1.6 oz (157.9 kg) SpO2 BMI Smoking Status 92% 43.51 kg/m2 Current Some Day Smoker BMI and BSA Data Body Mass Index Body Surface Area  
 43.51 kg/m 2 2.89 m 2 Preferred Pharmacy Pharmacy Name Phone [facility] [employer] [facility] Yamel Padilla  [place] [phone] Your Updated Medication List  
  
   
This list is accurate as of 9/11/18  9:41 AM.  Always use your most recent med list.  
  
  
  
  
 albuterol 90 mcg/actuation inhaler Commonly known as:  PROVENTIL HFA, VENTOLIN HFA, PROAIR HFA Take 1-2 Puffs by inhalation every four (4) hours as needed for Wheezing. aspirin delayed-release 81 mg tablet Take 1 Tab by mouth daily. Blood-Glucose Meter monitoring kit Check sugars TID. E11.65. Once touch  
  
 cyclobenzaprine 10 mg tablet Commonly known as:  FLEXERIL Take 1 Tab by mouth three (3) times daily as needed for Muscle Spasm(s). enalapril-hydroCHLOROthiazide 10-25 mg tablet Commonly known as:  VASERETIC  
TAKE 2 TABLETS BY MOUTH DAILY  
  
 glucose blood VI test strips strip Commonly known as:  ASCENSIA AUTODISC VI, ONE TOUCH ULTRA TEST VI  
E11.65. Check sugar TID HumaLOG KwikPen Insulin 100 unit/mL kwikpen Generic drug:  insulin lispro INJECT 15 UNITS SUBCUTANEOUS BEFORE BREAKFAST, LUNCH, AND DINNER  
  
 insulin glargine 300 unit/mL (1.5 mL) Inpn 60 Units by SubCUTAneous route daily. Insulin Needles (Disposable) 31 gauge x 5/16\" Ndle Use with insulin 4 times a day JANUMET 50-1,000 mg per tablet Generic drug:  SITagliptin-metFORMIN  
TAKE 1 TABLET BY MOUTH TWICE DAILY WITH MEALS Lancets Misc Use as directed. Dx: e11.65 check sugars TID LEVEMIR FLEXTOUCH U-100 INSULN 100 unit/mL (3 mL) Inpn Generic drug:  insulin detemir U-100 ADM 65 UNI SC QD  
  
 rosuvastatin 40 mg tablet Commonly known as:  CRESTOR Take 1 Tab by mouth nightly. sildenafil citrate 100 mg tablet Commonly known as:  VIAGRA Take 1 Tab by mouth as needed. Once daily Prescriptions Sent to Pharmacy Refills  
 cyclobenzaprine (FLEXERIL) 10 mg tablet 0 Sig: Take 1 Tab by mouth three (3) times daily as needed for Muscle Spasm(s). Class: Normal  
 Pharmacy: [facility] [address] #: [phone] Route: Oral  
  
Follow-up Instructions Return for as scheduled for DM. Patient Instructions Back Stretches: Exercises Your Care Instructions Here are some examples of exercises for stretching your back. Start each exercise slowly. Ease off the exercise if you start to have pain. Your doctor or physical therapist will tell you when you can start these exercises and which ones will work best for you. How to do the exercises Overhead stretch 1. Stand comfortably with your feet shoulder-width apart. 2. Looking straight ahead, raise both arms over your head and reach toward the ceiling. Do not allow your head to tilt back. 3. Hold for 15 to 30 seconds, then lower your arms to your sides. 4. Repeat 2 to 4 times. Side stretch 1. Stand comfortably with your feet shoulder-width apart. 2. Raise one arm over your head, and then lean to the other side. 3. Slide your hand down your leg as you let the weight of your arm gently stretch your side muscles. Hold for 15 to 30 seconds. 4. Repeat 2 to 4 times on each side. Press-up 1. Lie on your stomach, supporting your body with your forearms. 2. Press your elbows down into the floor to raise your upper back. As you do this, relax your stomach muscles and allow your back to arch without using your back muscles. As your press up, do not let your hips or pelvis come off the floor. 3. Hold for 15 to 30 seconds, then relax. 4. Repeat 2 to 4 times. Relax and rest 
 
1. Lie on your back with a rolled towel under your neck and a pillow under your knees. Extend your arms comfortably to your sides. 2. Relax and breathe normally. 3. Remain in this position for about 10 minutes. 4. If you can, do this 2 or 3 times each day. Follow-up care is a key part of your treatment and safety. Be sure to make and go to all appointments, and call your doctor if you are having problems. It's also a good idea to know your test results and keep a list of the medicines you take. Where can you learn more? Go to http://kimo-cherry.info/. Enter A832 in the search box to learn more about \"Back Stretches: Exercises. \" Current as of: November 29, 2017 Content Version: 11.7 © 5789-5274 [employer]. Care instructions adapted under license by [employer] (which disclaims liability or warranty for this information). If you have questions about a medical condition or this instruction, always ask your healthcare professional. [address] any warranty or liability for your use of this information. Body Mass Index: Care Instructions Your Care Instructions Body mass index (BMI) can help you see if your weight is raising your risk for health problems. It uses a formula to compare how much you weigh with how tall you are. · A BMI lower than 18.5 is considered underweight. · A BMI between 18.5 and 24.9 is considered healthy. · A BMI between 25 and 29.9 is considered overweight. A BMI of 30 or higher is considered obese. If your BMI is in the normal range, it means that you have a lower risk for weight-related health problems. If your BMI is in the overweight or obese range, you may be at increased risk for weight-related health problems, such as high blood pressure, heart disease, stroke, arthritis or joint pain, and diabetes.  If your BMI is in the underweight range, you may be at increased risk for health problems such as fatigue, lower protection (immunity) against illness, muscle loss, bone loss, hair loss, and hormone problems. BMI is just one measure of your risk for weight-related health problems. You may be at higher risk for health problems if you are not active, you eat an unhealthy diet, or you drink too much alcohol or use tobacco products. Follow-up care is a key part of your treatment and safety. Be sure to make and go to all appointments, and call your doctor if you are having problems. It's also a good idea to know your test results and keep a list of the medicines you take. How can you care for yourself at home? · Practice healthy eating habits. This includes eating plenty of fruits, vegetables, whole grains, lean protein, and low-fat dairy. · If your doctor recommends it, get more exercise. Walking is a good choice. Bit by bit, increase the amount you walk every day. Try for at least 30 minutes on most days of the week. · Do not smoke. Smoking can increase your risk for health problems. If you need help quitting, talk to your doctor about stop-smoking programs and medicines. These can increase your chances of quitting for good. · Limit alcohol to 2 drinks a day for men and 1 drink a day for women. Too much alcohol can cause health problems. If you have a BMI higher than 25 · Your doctor may do other tests to check your risk for weight-related health problems. This may include measuring the distance around your waist. A waist measurement of more than 40 inches in men or 35 inches in women can increase the risk of weight-related health problems. · Talk with your doctor about steps you can take to stay healthy or improve your health. You may need to make lifestyle changes to lose weight and stay healthy, such as changing your diet and getting regular exercise. If you have a BMI lower than 18.5 · Your doctor may do other tests to check your risk for health problems. · Talk with your doctor about steps you can take to stay healthy or improve your health. You may need to make lifestyle changes to gain or maintain weight and stay healthy, such as getting more healthy foods in your diet and doing exercises to build muscle. Where can you learn more? Go to http://kimo-cherry.info/. Enter S176 in the search box to learn more about \"Body Mass Index: Care Instructions. \" Current as of: October 13, 2016 Content Version: 11.4 © 9005-7462 [employer]. Care instructions adapted under license by [employer] (which disclaims liability or warranty for this information). If you have questions about a medical condition or this instruction, always ask your healthcare professional. [address] any warranty or liability for your use of this information. Introducing [facility]! Nafisa Castañeda introduces Flyfit patient portal. Now you can access parts of your medical record, email your doctor's office, and request medication refills online. 1. In your internet browser, go to [email or web address] 2. Click on the First Time User? Click Here link in the Sign In box. You will see the New Member Sign Up page. 3. Enter your Flyfit Access Code exactly as it appears below. You will not need to use this code after youve completed the sign-up process. If you do not sign up before the expiration date, you must request a new code. · Flyfit Access Code: [email or web address] 
Expires: 11/5/2018 10:22 AM 
 
4. Enter the last four digits of your Social Security Number (xxxx) and Date of Birth (mm/dd/yyyy) as indicated and click Submit. You will be taken to the next sign-up page. 5. Create a Flyfit ID. This will be your Flyfit login ID and cannot be changed, so think of one that is secure and easy to remember. 6. Create a [employer] password. You can change your password at any time. 7. Enter your Password Reset Question and Answer. This can be used at a later time if you forget your password. 8. Enter your e-mail address. You will receive e-mail notification when new information is available in [address] 9. Click Sign Up. You can now view and download portions of your medical record. 10. Click the Download Summary menu link to download a portable copy of your medical information. If you have questions, please visit the Frequently Asked Questions section of the [employer] website. Remember, [employer] is NOT to be used for urgent needs. For medical emergencies, dial 911. Now available from your iPhone and Android! Please provide this summary of care documentation to your next provider. Your primary care clinician is listed as Judd Agustin. If you have any questions after today's visit, please call [phone].

## 2018-09-11 NOTE — PROGRESS NOTES
Pt here for   Chief Complaint   Patient presents with    LOW BACK PAIN     1. Have you been to the ER, urgent care clinic since your last visit? Hospitalized since your last visit? No    2. Have you seen or consulted any other health care providers outside of the [address] [place] [address] since your last visit? Include any pap smears or colon screening.  No       Pt c/o 6 of 10, Pt denies taking anything for pain today        PHQ over the last two weeks 9/11/2018   PHQ Not Done -   Little interest or pleasure in doing things Not at all   Feeling down, depressed, irritable, or hopeless Not at all   Total Score PHQ 2 0

## 2018-09-20 RX ORDER — INSULIN DETEMIR 100 [IU]/ML
INJECTION, SOLUTION SUBCUTANEOUS
Qty: 15 ML | Refills: 0 | Status: SHIPPED | OUTPATIENT
Start: 2018-09-20 | End: 2018-11-07 | Stop reason: SDUPTHER

## 2018-10-12 DIAGNOSIS — Z79.4 UNCONTROLLED TYPE 2 DIABETES MELLITUS WITH HYPERGLYCEMIA, WITH LONG-TERM CURRENT USE OF INSULIN (HCC): ICD-10-CM

## 2018-10-12 DIAGNOSIS — E11.65 UNCONTROLLED TYPE 2 DIABETES MELLITUS WITH HYPERGLYCEMIA, WITH LONG-TERM CURRENT USE OF INSULIN (HCC): ICD-10-CM

## 2018-10-15 RX ORDER — ALBUTEROL SULFATE 90 UG/1
AEROSOL, METERED RESPIRATORY (INHALATION)
Qty: 1 INHALER | Refills: 1 | Status: SHIPPED | OUTPATIENT
Start: 2018-10-15 | End: 2019-05-20 | Stop reason: SDUPTHER

## 2018-10-15 RX ORDER — SITAGLIPTIN AND METFORMIN HYDROCHLORIDE 50; 1000 MG/1; MG/1
TABLET, FILM COATED ORAL
Qty: 180 TAB | Refills: 0 | Status: SHIPPED | OUTPATIENT
Start: 2018-10-15 | End: 2018-12-17

## 2018-11-25 DIAGNOSIS — I10 ESSENTIAL HYPERTENSION: ICD-10-CM

## 2018-11-26 RX ORDER — ENALAPRIL MALEATE AND HYDROCHLOROTHIAZIDE 10; 25 MG/1; MG/1
TABLET ORAL
Qty: 180 TAB | Refills: 0 | Status: SHIPPED | OUTPATIENT
Start: 2018-11-26 | End: 2019-03-06 | Stop reason: SDUPTHER

## 2018-12-17 ENCOUNTER — OFFICE VISIT (OUTPATIENT)
Dept: INTERNAL MEDICINE CLINIC | Age: 48
End: 2018-12-17

## 2018-12-17 VITALS
TEMPERATURE: 97.8 F | OXYGEN SATURATION: 95 % | SYSTOLIC BLOOD PRESSURE: 127 MMHG | HEART RATE: 88 BPM | HEIGHT: 75 IN | DIASTOLIC BLOOD PRESSURE: 77 MMHG | RESPIRATION RATE: 18 BRPM | BODY MASS INDEX: 39.17 KG/M2 | WEIGHT: 315 LBS

## 2018-12-17 DIAGNOSIS — E11.65 TYPE 2 DIABETES MELLITUS WITH HYPERGLYCEMIA, WITH LONG-TERM CURRENT USE OF INSULIN (HCC): Primary | ICD-10-CM

## 2018-12-17 DIAGNOSIS — I10 ESSENTIAL HYPERTENSION: ICD-10-CM

## 2018-12-17 DIAGNOSIS — Z79.4 TYPE 2 DIABETES MELLITUS WITH HYPERGLYCEMIA, WITH LONG-TERM CURRENT USE OF INSULIN (HCC): Primary | ICD-10-CM

## 2018-12-17 PROBLEM — E11.21 TYPE 2 DIABETES WITH NEPHROPATHY (HCC): Status: RESOLVED | Noted: 2018-04-19 | Resolved: 2018-12-17

## 2018-12-17 PROBLEM — F17.200 SMOKER: Status: ACTIVE | Noted: 2018-12-17

## 2018-12-17 LAB
CHOLEST SERPL-MCNC: 145 MG/DL
GLUCOSE POC: 287 MG/DL
HBA1C MFR BLD HPLC: 11.3 %
HDLC SERPL-MCNC: 53 MG/DL
LDL CHOLESTEROL POC: 64 MG/DL
NON-HDL GOAL (POC): 92
TCHOL/HDL RATIO (POC): 1.2
TRIGL SERPL-MCNC: 138 MG/DL

## 2018-12-17 RX ORDER — METFORMIN HYDROCHLORIDE 1000 MG/1
1000 TABLET ORAL 2 TIMES DAILY WITH MEALS
Qty: 180 TAB | Refills: 1 | Status: SHIPPED | OUTPATIENT
Start: 2018-12-17 | End: 2019-07-15 | Stop reason: SDUPTHER

## 2018-12-17 NOTE — PATIENT INSTRUCTIONS
Check your sugars fasting every morning      Liraglutide (By injection)   Liraglutide (bky-g-GVZV-tide)  Treats type 2 diabetes and helps with weight loss in certain patients. Also reduces the risk of heart attacks and strokes in patients with type 2 diabetes and heart or blood vessel disease. Brand Name(s): Saxjack Victoza   There may be other brand names for this medicine. When This Medicine Should Not Be Used: This medicine is not right for everyone. Do not use it if you had an allergic reaction to liraglutide, or if you have multiple endocrine neoplasia syndrome type 2 (MEN 2) or if you or anyone in your family had medullary thyroid cancer. Tell your doctor if you are pregnant or have become pregnant while you are using this medicine. How to Use This Medicine:   Injectable  · Your doctor will prescribe your exact dose and tell you how often it should be given. This medicine is given as a shot under the skin of your stomach, thighs, or upper arms. · If you use insulin in addition to this medicine, do not mix them into the same syringe. You may give the shots in the same area (including your stomach), but do not give the shots right next to each other. · You may be taught how to give your medicine at home. Make sure you understand all instructions before giving yourself an injection. Do not use more medicine or use it more often than your doctor tells you to. · Check the liquid in the pen. It should be clear and colorless. Do not use it if it is cloudy, discolored, or has particles in it. · You will be shown the body areas where this shot can be given. Use a different body area each time you give yourself a shot. Keep track of where you give each shot to make sure you rotate body areas. · Use a new needle and syringe each time you inject your medicine. · Never share medicine pens with others under any circumstances. Sharing needles or pens can result in transmission of infection.   · Drink extra fluids so you will urinate more often and help prevent kidney problems. · This medicine should come with a Medication Guide. Ask your pharmacist for a copy if you do not have one. · Missed dose: If you miss a dose of this medicine, use it as soon as you remember. Then take your next dose at your usual time. Never take extra medicine to make up for a missed dose. If you miss a dose for 3 days or more, call your doctor to talk about how to restart your treatment. · Store your new, unused medicine pen in its original carton in the refrigerator. Protect it from light. Do not freeze this medicine or use it if it has been frozen. You may store the opened medicine pen in the refrigerator or at room temperature for 30 days. Throw away your used pen after 30 days, even if it still has medicine in it. Always remove the needle from the pen before you store it. · Throw away used needles in a hard, closed container that the needles cannot poke through. Keep this container away from children and pets. Drugs and Foods to Avoid:      Ask your doctor or pharmacist before using any other medicine, including over-the-counter medicines, vitamins, and herbal products. Warnings While Using This Medicine:   · Tell your doctor if you are breastfeeding, or if you have kidney disease, liver disease, digestion problems (including gastroparesis), gallbladder disease, or a history of pancreas problems, depression, or angioedema (swelling of the arms, face, hands, mouth, or throat). · Do not use Saxenda® if you are also using Victoza®. They contain the same medicine. · This medicine may cause the following problems:   ¨ Increased risk for thyroid tumors  ¨ Pancreatitis  ¨ Low blood sugar  ¨ Kidney problems  ¨ Gallbladder problems, including gallstones  ¨ Thoughts of hurting yourself [employer])  · Your doctor will do lab tests at regular visits to check on the effects of this medicine. Keep all appointments.   · Keep all medicine out of the reach of children. Never share your medicine with anyone. Possible Side Effects While Using This Medicine:   Call your doctor right away if you notice any of these side effects:  · Allergic reaction: Itching or hives, swelling in your face or hands, swelling or tingling in your mouth or throat, chest tightness, trouble breathing  · Change in how much or how often you urinate, painful or burning urination  · Feeling sad or depressed, thoughts of suicide, unusual changes in mood or behavior  · Shaking, trembling, sweating, fast or pounding heartbeat, fainting, hunger, confusion  · Sudden and severe stomach pain, nausea, vomiting, fever, lightheadedness  · Trouble breathing or swallowing, a lump in your neck, hoarseness when speaking  · Yellow skin or eyes  If you notice these less serious side effects, talk with your doctor:   · Decreased appetite  · Diarrhea, constipation, stomach upset  · Dizziness  · Headache  · Redness, itching, swelling, or any changes in your skin where the shot was given  If you notice other side effects that you think are caused by this medicine, tell your doctor. Call your doctor for medical advice about side effects. You may report side effects to FDA at 9-005-FDA-7504  © 2017 [facility] Information is for End User's use only and may not be sold, redistributed or otherwise used for commercial purposes. The above information is an  only. It is not intended as medical advice for individual conditions or treatments. Talk to your doctor, nurse or pharmacist before following any medical regimen to see if it is safe and effective for you. Nutrition Tips for Diabetes: After Your Visit  Your Care Instructions  A healthy diet is important to manage diabetes. It helps you lose weight (if you need to) and keep it off. It gives you the nutrition and energy your body needs and helps prevent heart disease.  But a diet for diabetes does not mean that you have to eat special foods. You can eat what your family eats, including occasional sweets and other favorites. But you do have to pay attention to how often you eat and how much you eat of certain foods. The right plan for you will give you meals that help you keep your blood sugar at healthy levels. Try to eat a variety of foods and to spread carbohydrate throughout the day. Carbohydrate raises blood sugar higher and more quickly than any other nutrient does. Carbohydrate is found in sugar, breads and cereals, fruit, starchy vegetables such as potatoes and corn, and milk and yogurt. You may want to work with a dietitian or diabetes educator to help you plan meals and snacks. A dietitian or diabetes educator also can help you lose weight if that is one of your goals. The following tips can help you enjoy your meals and stay healthy. Follow-up care is a key part of your treatment and safety. Be sure to make and go to all appointments, and call your doctor if you are having problems. Its also a good idea to know your test results and keep a list of the medicines you take. How can you care for yourself at home? · Learn which foods have carbohydrate and how much carbohydrate to eat. A dietitian or diabetes educator can help you learn to keep track of how much carbohydrate you eat. · Spread carbohydrate throughout the day. Eat some carbohydrate at all meals, but do not eat too much at any one time. · Plan meals to include food from all the food groups. These are the food groups and some example portion sizes:  ¨ Grains: 1 slice of bread (1 ounce), ½ cup of cooked cereal, and 1/3 cup of cooked pasta or rice. These have about 15 grams of carbohydrate in a serving. Choose whole grains such as whole wheat bread or crackers, oatmeal, and brown rice more often than refined grains.   ¨ Fruit: 1 small fresh fruit, such as an apple or orange; ½ of a banana; ½ cup of chopped, cooked, or canned fruit; ½ cup of fruit juice; 1 cup of melon or raspberries; and 2 tablespoons of dried fruit. These have about 15 grams of carbohydrate in a serving. ¨ Dairy: 1 cup of nonfat or low-fat milk and 2/3 cup of plain yogurt. These have about 15 grams of carbohydrate in a serving. ¨ Protein foods: Beef, chicken, turkey, fish, eggs, tofu, cheese, cottage cheese, and peanut butter. A serving size of meat is 3 ounces, which is about the size of a deck of cards. Examples of meat substitute serving sizes (equal to 1 ounce of meat) are 1/4 cup of cottage cheese, 1 egg, 1 tablespoon of peanut butter, and ½ cup of tofu. These have very little or no carbohydrate per serving. ¨ Vegetables: Starchy vegetables such as ½ cup of cooked dried beans, peas, potatoes, or corn have about 15 grams of carbohydrate. Nonstarchy vegetables have very little carbohydrate, such as 1 cup of raw leafy vegetables (such as spinach), ½ cup of other vegetables (cooked or chopped), and 3/4 cup of vegetable juice. · Use the plate format to plan meals. It is a good, quick way to make sure that you have a balanced meal. It also helps you spread carbohydrate throughout the day. You divide your plate by types of foods. Put vegetables on half the plate, meat or meat substitutes on one-quarter of the plate, and a grain or starchy vegetable (such as brown rice or a potato) in the final quarter of the plate. To this you can add a small piece of fruit and 1 cup of milk or yogurt, depending on how much carbohydrate you are supposed to eat at a meal.  · Talk to your dietitian or diabetes educator about ways to add limited amounts of sweets into your meal plan. You can eat these foods now and then, as long as you include the amount of carbohydrate they have in your daily carbohydrate allowance. · If you drink alcohol, limit it to no more than 1 drink a day for women and 2 drinks a day for men. If you are pregnant, no amount of alcohol is known to be safe.   · Protein, fat, and fiber do not raise blood sugar as much as carbohydrate does. If you eat a lot of these nutrients in a meal, your blood sugar will rise more slowly than it would otherwise. · Limit saturated fats, such as those from meat and dairy products. Try to replace it with monounsaturated fat, such as olive oil. This is a healthier choice because people who have diabetes are at higher-than-average risk of heart disease. But use a modest amount of olive oil. A tablespoon of olive oil has 14 grams of fat and 120 calories. · Exercise lowers blood sugar. If you take insulin by shots or pump, you can use less than you would if you were not exercising. Keep in mind that timing matters. If you exercise within 1 hour after a meal, your body may need less insulin for that meal than it would if you exercised 3 hours after the meal. Test your blood sugar to find out how exercise affects your need for insulin. · Exercise on most days of the week. Aim for at least 30 minutes. Exercise helps you stay at a healthy weight and helps your body use insulin. Walking is an easy way to get exercise. Gradually increase the amount you walk every day. You also may want to swim, bike, or do other activities. When you eat out  · Learn to estimate the serving sizes of foods that have carbohydrate. If you measure food at home, it will be easier to estimate the amount in a serving of restaurant food. · If the meal you order has too much carbohydrate (such as potatoes, corn, or baked beans), ask to have a low-carbohydrate food instead. Ask for a salad or green vegetables. · If you use insulin, check your blood sugar before and after eating out to help you plan how much to eat in the future. · If you eat more carbohydrate at a meal than you had planned, take a walk or do other exercise. This will help lower your blood sugar. Where can you learn more?    Go to [employer]  Enter T692 in the search box to learn more about \"Nutrition Tips for Diabetes: After Your Visit. \"   © 4534-8782 [employer]wise[employer]. Care instructions adapted under license by [employer] (which disclaims liability or warranty for this information). This care instruction is for use with your licensed healthcare professional. If you have questions about a medical condition or this instruction, always ask your healthcare professional. [address] any warranty or liability for your use of this information.   Content Version: 80.7.120489; Current as of: June 4, 2014

## 2018-12-17 NOTE — PROGRESS NOTES
Pt here for   Chief Complaint   Patient presents with    Follow-up     3 month recheck    Diabetes    Hypertension     1. Have you been to the ER, urgent care clinic since your last visit? Hospitalized since your last visit? No    2. Have you seen or consulted any other health care providers outside of the [address] [place] [address] since your last visit? Include any pap smears or colon screening.  No         Pt denies pain at this time          PHQ over the last two weeks 12/17/2018   PHQ Not Done -   Little interest or pleasure in doing things Not at all   Feeling down, depressed, irritable, or hopeless Not at all   Total Score PHQ 2 0

## 2018-12-17 NOTE — PROGRESS NOTES
Subjective: (As above and below)     Chief Complaint   Patient presents with    Follow-up     3 month recheck    Diabetes    Hypertension     Mena Aiken is a 50y.o. year old male who presents for DM2      Diabetic Review of Systems - medication compliance: noncompliant some of the time, diabetic diet compliance: noncompliant some of the time, home glucose monitoring: is performed sporadically. He would estimate that in the past 3 months, he has missed his levemir approx 10 times. He states that he does not eat three meals per day regularly and so rarely takes the meal time insulin. He works from [employer] to 11pm and eats a large dinner meal during his work break around [employer]. He then goes home and stays up watching tv, he will have an evening snack (tuna, boiled egg) he stays up late and so sleeps for much of the morning, does not eat breakfast/lunch. Cheats on weekends, burgers/fries. He attended DM education in the past. He did not bring his meter in, states sugars have been in the 200's (does not check fasting sugars). No hypoglycemic episodes. 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 No ED visits noted in [employer] system for diabetes related complications. Not on sedative medications    Hypertension ROS:  taking medications as instructed, no medication side effects noted, no TIAs, no chest pain on exertion, no dyspnea on exertion, no swelling of ankles    Weight: exercising approx 3x week. Weight lifting, treadmill. Previously 5x per week, hopes to resume this after the holidays.  When he exercising, he denies sob/cp    Wt Readings from Last 3 Encounters:   12/17/18 348 lb 1.6 oz (157.9 kg)   09/11/18 348 lb 1.6 oz (157.9 kg)   08/07/18 348 lb 1.6 oz (157.9 kg)     Smoking: smokes black and milds, approx 5 per day    Erectile dysfunction: has headaches w/ 100mg dose, but not w/ 50mg of viagra    Sleep apnea: wears cpap nightly      Reviewed PmHx, RxHx, FmHx, SocHx, AllgHx and updated in chart. Family History   Problem Relation Age of Onset    Diabetes Mother         cancer, liver? hep c    Hypertension Mother     Hypertension Father     Diabetes Brother     Hypertension Brother     Hypertension Maternal Uncle     Diabetes Maternal Grandmother     Diabetes Paternal Grandmother     Hypertension Maternal Uncle     Hypertension Maternal Uncle     Diabetes Paternal Uncle        Past Medical History:   Diagnosis Date    Asthma     Diabetes ([facility] [address]     HTN (hypertension) 3/30/2010    Hypertension     Other and unspecified hyperlipidemia 3/30/2010    Sleep apnea     Type II or unspecified type diabetes mellitus without mention of complication, uncontrolled 3/30/2010      Social History     Socioeconomic History    Marital status: SINGLE     Spouse name: Not on file    Number of children: Not on file    Years of education: Not on file    Highest education level: Not on file   Tobacco Use    Smoking status: Current Some Day Smoker     Packs/day: 0.50     Years: 22.00     Pack years: 11.00     Types: Cigars    Smokeless tobacco: Never Used    Tobacco comment: black and mild   Substance and Sexual Activity    Alcohol use: Yes     Alcohol/week: 6.0 oz     Types: 10 Standard drinks or equivalent per week     Comment: rare    Drug use: No    Sexual activity: Yes     Partners: Female     Birth control/protection: Condom   Social History Narrative    6/6/17: works at [employer]          Current Outpatient Medications   Medication Sig    metFORMIN (GLUCOPHAGE) 1,000 mg tablet Take 1 Tab by mouth two (2) times daily (with meals). FOR DIABETES    liraglutide (VICTOZA) 0.6 mg/0.1 mL (18 mg/3 mL) pnij 0.6 mg by SubCUTAneous route daily. X1 WEEK. IF TOLERATED INCREASE TO 1.2MG DAILY. FOR DIABETES    insulin detemir U-100 (LEVEMIR FLEXTOUCH U-100 INSULN) 100 unit/mL (3 mL) inpn 50 Units by SubCUTAneous route nightly.  FOR DIABETES    Insulin Needles, Disposable, 30 gauge x 1/3\" by SubCUTAneous route two (2) times a day. levamir & victoza    enalapril-hydroCHLOROthiazide (VASERETIC) 10-25 mg tablet TAKE 2 TABLETS BY MOUTH DAILY    VENTOLIN HFA 90 mcg/actuation inhaler INHALE 1 TO 2 PUFFS BY MOUTH EVERY 4 HOURS AS NEEDED FOR WHEEZING    rosuvastatin (CRESTOR) 40 mg tablet Take 1 Tab by mouth nightly.  sildenafil citrate (VIAGRA) 100 mg tablet Take 1 Tab by mouth as needed. Once daily    Blood-Glucose Meter monitoring kit Check sugars TID. E11.65. Once touch    Lancets misc Use as directed. Dx: e11.65 check sugars TID    glucose blood VI test strips (ASCENSIA AUTODISC VI, ONE TOUCH ULTRA TEST VI) strip E11.65. Check sugar TID    aspirin delayed-release 81 mg tablet Take 1 Tab by mouth daily. No current facility-administered medications for this visit. Review of Systems:   Constitutional:    Negative for fever and chills, negative diaphoresis. HEENT:              Negative for neck pain and stiffness. Eyes:                  Negative for visual disturbance, itching, redness or discharge. Respiratory:        Negative for cough and shortness of breath. Cardiovascular:  Negative for chest pain and palpitations. Gastrointestinal: Negative for nausea, vomiting, abdominal pain, diarrhea or constipation. Genitourinary:     Negative for dysuria and frequency. Musculoskeletal: Negative for falls, tenderness and swelling. Skin:                    Negative for rash, masses or lesions. Neurological:       Negative for dizzyness, seizure, loss of consciousness, weakness and numbness.      Objective:     Vitals:    12/17/18 0903 12/17/18 0929   BP: (!) 143/94 127/77   Pulse: 87 88   Resp: 18    Temp: 97.8 °F (36.6 °C)    TempSrc: Oral    SpO2: 95%    Weight: 348 lb 1.6 oz (157.9 kg)    Height: 6' 3\" (1.905 m)        Results for orders placed or performed in visit on 12/17/18   AMB POC HEMOGLOBIN A1C   Result Value Ref Range    Hemoglobin A1c (POC) 11.3 %   AMB POC LIPID PROFILE   Result Value Ref Range    Cholesterol (POC) 145     Triglycerides (POC) 138     HDL Cholesterol (POC) 53     LDL Cholesterol (POC) 64 MG/DL    Non-HDL Goal (POC) 92     TChol/HDL Ratio (POC) 1.2    AMB POC GLUCOSE BLOOD, BY GLUCOSE MONITORING DEVICE   Result Value Ref Range    Glucose  mg/dL         Physical Examination: General appearance - alert, well appearing, and in no distress and overweight  Chest - clear to auscultation, no wheezes, rales or rhonchi, symmetric air entry  Heart - normal rate, regular rhythm, normal S1, S2, no murmurs, rubs, clicks or gallops  Extremities - no pedal edema noted      Assessment/ Plan:   Follow-up Disposition:  Return in about 3 weeks (around 1/7/2019) for diabetes. Work/sleep schedule not allowing for regular meals, d/c mealtime insulin. Start victoza (no known family hx MTC). D/c januvia. Check FASTING sugars, increase victoza in 1 week if tolerated well. 1. Type 2 diabetes mellitus with hyperglycemia, with long-term current use of insulin (HCC)    - AMB POC HEMOGLOBIN A1C  - AMB POC LIPID PROFILE  - AMB POC GLUCOSE BLOOD, BY GLUCOSE MONITORING DEVICE  - METABOLIC PANEL, BASIC  - CBC W/O DIFF  - metFORMIN (GLUCOPHAGE) 1,000 mg tablet; Take 1 Tab by mouth two (2) times daily (with meals). FOR DIABETES  Dispense: 180 Tab; Refill: 1  - liraglutide (VICTOZA) 0.6 mg/0.1 mL (18 mg/3 mL) pnij; 0.6 mg by SubCUTAneous route daily. X1 WEEK. IF TOLERATED INCREASE TO 1.2MG DAILY. FOR DIABETES  Dispense: 5 Pen; Refill: 1  - insulin detemir U-100 (LEVEMIR FLEXTOUCH U-100 INSULN) 100 unit/mL (3 mL) inpn; 50 Units by SubCUTAneous route nightly. FOR DIABETES  Dispense: 15 mL; Refill: 0  - Insulin Needles, Disposable, 30 gauge x 1/3\"; by SubCUTAneous route two (2) times a day. levamir & victoza  Dispense: 1 Package; Refill: 11    2.  Essential hypertension    - METABOLIC PANEL, BASIC  - CBC W/O DIFF        I have discussed the diagnosis with the patient and the intended plan as seen in the above orders. The patient has received an after-visit summary and questions were answered concerning future plans. Pt conveyed understanding of plan. Medication Side Effects and Warnings were discussed with patient: yes  Patient Labs were reviewed: yes  Patient Past Records were reviewed:  yes    Gerri Marlow.  John Gipson NP

## 2018-12-28 ENCOUNTER — TELEPHONE (OUTPATIENT)
Dept: INTERNAL MEDICINE CLINIC | Age: 48
End: 2018-12-28

## 2018-12-28 NOTE — TELEPHONE ENCOUNTER
12-28 -18 called patient @ [phone] spoke to patient notified patient of providers responses he states he still would like the order sent to his pharmacy, he states as long as the [employer] pays for it he is fine with the other part, Writer advised him that we don't know if they will cover it  and we won't know until it is processed by his Pharmacy And that would up to his [employer].  Pt understood instructions Rosalva Castillo LPN

## 2019-01-07 ENCOUNTER — OFFICE VISIT (OUTPATIENT)
Dept: INTERNAL MEDICINE CLINIC | Age: 49
End: 2019-01-07

## 2019-01-07 VITALS
HEIGHT: 75 IN | TEMPERATURE: 97.1 F | OXYGEN SATURATION: 95 % | BODY MASS INDEX: 39.17 KG/M2 | DIASTOLIC BLOOD PRESSURE: 84 MMHG | HEART RATE: 102 BPM | SYSTOLIC BLOOD PRESSURE: 135 MMHG | RESPIRATION RATE: 18 BRPM | WEIGHT: 315 LBS

## 2019-01-07 DIAGNOSIS — E11.65 TYPE 2 DIABETES MELLITUS WITH HYPERGLYCEMIA, WITH LONG-TERM CURRENT USE OF INSULIN (HCC): Primary | ICD-10-CM

## 2019-01-07 DIAGNOSIS — Z79.4 TYPE 2 DIABETES MELLITUS WITH HYPERGLYCEMIA, WITH LONG-TERM CURRENT USE OF INSULIN (HCC): Primary | ICD-10-CM

## 2019-01-07 LAB — GLUCOSE POC: 139 MG/DL

## 2019-01-07 NOTE — PROGRESS NOTES
Subjective: (As above and below)     Chief Complaint   Patient presents with    Follow-up     New Medication for Diabetes     Sylvie Gar is a 50y.o. year old male who presents for DM2    Diabetic Review of Systems - medication compliance: compliant most of the time, diabetic diet compliance: compliant most of the time, home glucose monitoring: his meter broke. He is taking victoza w/o problems. His meter broke and has not been able to check sugars the past 2 weeks. Denies s/s of hypoglycemia. Wt Readings from Last 3 Encounters:   01/07/19 345 lb 1.6 oz (156.5 kg)   12/17/18 348 lb 1.6 oz (157.9 kg)   09/11/18 348 lb 1.6 oz (157.9 kg)            Reviewed PmHx, RxHx, FmHx, SocHx, AllgHx and updated in chart. Family History   Problem Relation Age of Onset    Diabetes Mother         cancer, liver? hep c    Hypertension Mother     Hypertension Father     Diabetes Brother     Hypertension Brother     Hypertension Maternal Uncle     Diabetes Maternal Grandmother     Diabetes Paternal Grandmother     Hypertension Maternal Uncle     Hypertension Maternal Uncle     Diabetes Paternal Uncle        Past Medical History:   Diagnosis Date    Asthma     Diabetes ([facility] [address]     HTN (hypertension) 3/30/2010    Hypertension     Other and unspecified hyperlipidemia 3/30/2010    Sleep apnea     Type II or unspecified type diabetes mellitus without mention of complication, uncontrolled 3/30/2010      Social History     Socioeconomic History    Marital status: SINGLE     Spouse name: Not on file    Number of children: Not on file    Years of education: Not on file    Highest education level: Not on file   Tobacco Use    Smoking status: Current Some Day Smoker     Packs/day: 0.50     Years: 22.00     Pack years: 11.00     Types: Cigars    Smokeless tobacco: Never Used    Tobacco comment: black and mild   Substance and Sexual Activity    Alcohol use:  Yes     Alcohol/week: 6.0 oz     Types: 10 Standard drinks or equivalent per week     Comment: rare    Drug use: No    Sexual activity: Yes     Partners: Female     Birth control/protection: Condom   Social History Narrative    6/6/17: works at [employer]          Current Outpatient Medications   Medication Sig    liraglutide (VICTOZA) 0.6 mg/0.1 mL (18 mg/3 mL) pnij 1.2 mg by SubCUTAneous route daily. X1 WEEK. IF TOLERATED INCREASE TO 1.2MG DAILY. FOR DIABETES    flash glucose scanning reader (FREESTYLE FERMIN 14 DAY READER) misc 1 Each by Does Not Apply route Once every 2 weeks.  metFORMIN (GLUCOPHAGE) 1,000 mg tablet Take 1 Tab by mouth two (2) times daily (with meals). FOR DIABETES    insulin detemir U-100 (LEVEMIR FLEXTOUCH U-100 INSULN) 100 unit/mL (3 mL) inpn 50 Units by SubCUTAneous route nightly. FOR DIABETES    Insulin Needles, Disposable, 30 gauge x 1/3\" by SubCUTAneous route two (2) times a day. levamir & victoza    enalapril-hydroCHLOROthiazide (VASERETIC) 10-25 mg tablet TAKE 2 TABLETS BY MOUTH DAILY    VENTOLIN HFA 90 mcg/actuation inhaler INHALE 1 TO 2 PUFFS BY MOUTH EVERY 4 HOURS AS NEEDED FOR WHEEZING    rosuvastatin (CRESTOR) 40 mg tablet Take 1 Tab by mouth nightly.  sildenafil citrate (VIAGRA) 100 mg tablet Take 1 Tab by mouth as needed. Once daily    Blood-Glucose Meter monitoring kit Check sugars TID. E11.65. Once touch    Lancets misc Use as directed. Dx: e11.65 check sugars TID    glucose blood VI test strips (ASCENSIA AUTODISC VI, ONE TOUCH ULTRA TEST VI) strip E11.65. Check sugar TID    aspirin delayed-release 81 mg tablet Take 1 Tab by mouth daily. No current facility-administered medications for this visit. Review of Systems:   Constitutional:    Negative for fever and chills, negative diaphoresis. HEENT:              Negative for neck pain and stiffness. Eyes:                  Negative for visual disturbance, itching, redness or discharge. Respiratory:        Negative for cough and shortness of breath. Cardiovascular:  Negative for chest pain and palpitations. Gastrointestinal: Negative for nausea, vomiting, abdominal pain, diarrhea or constipation. Genitourinary:     Negative for dysuria and frequency. Musculoskeletal: Negative for falls, tenderness and swelling. Skin:                    Negative for rash, masses or lesions. Neurological:       Negative for dizzyness, seizure, loss of consciousness, weakness and numbness. Objective:     Vitals:    01/07/19 0822   BP: 135/84   Pulse: (!) 102   Resp: 18   Temp: 97.1 °F (36.2 °C)   TempSrc: Oral   SpO2: 95%   Weight: 345 lb 1.6 oz (156.5 kg)   Height: 6' 3\" (1.905 m)       Results for orders placed or performed in visit on 01/07/19   AMB POC GLUCOSE BLOOD, BY GLUCOSE MONITORING DEVICE   Result Value Ref Range    Glucose  mg/dL         Physical Examination: General appearance - alert, well appearing, and in no distress and overweight  Chest - clear to auscultation, no wheezes, rales or rhonchi, symmetric air entry  Heart - normal rate, regular rhythm, normal S1, S2, no murmurs, rubs, clicks or gallops  Extremities - no pedal edema noted      Assessment/ Plan:   Follow-up Disposition:  Return in about 6 weeks (around 2/18/2019) for dm. Unfortunately he has not had a meter, he will get one today. Will track fasting sugars, hopeful that he can be weaned off levemir. Will start checking daily and will touch base on Friday. He is on a work restriction until sugars improve. 1. Type 2 diabetes mellitus with hyperglycemia, with long-term current use of insulin ([facility])    - AMB POC GLUCOSE BLOOD, BY GLUCOSE MONITORING DEVICE  - liraglutide (VICTOZA) 0.6 mg/0.1 mL (18 mg/3 mL) pnij; 1.2 mg by SubCUTAneous route daily. X1 WEEK. IF TOLERATED INCREASE TO 1.2MG DAILY. FOR DIABETES  Dispense: 5 Pen; Refill: 1        I have discussed the diagnosis with the patient and the intended plan as seen in the above orders.   The patient has received an after-visit summary and questions were answered concerning future plans. Pt conveyed understanding of plan. Medication Side Effects and Warnings were discussed with patient: yes  Patient Labs were reviewed: yes  Patient Past Records were reviewed:  yes    Mono Bean.  Pallavi Ang NP

## 2019-01-07 NOTE — PROGRESS NOTES
Pt here for   Chief Complaint   Patient presents with    Follow-up     New Medication for Diabetes     1. Have you been to the ER, urgent care clinic since your last visit? Hospitalized since your last visit? No    2. Have you seen or consulted any other health care providers outside of the [facility] since your last visit? Include any pap smears or colon screening.  No            Pt denies pain at this time          PHQ over the last two weeks 1/7/2019   PHQ Not Done -   Little interest or pleasure in doing things Not at all   Feeling down, depressed, irritable, or hopeless Not at all   Total Score PHQ 2 0

## 2019-01-07 NOTE — PATIENT INSTRUCTIONS
Please check your blood sugars every morning fasting. If you are getting less than 90 regularly or greater than 200 regularly call me. Ideally, we will start to wean you off of the levemir. Nutrition Tips for Diabetes: After Your Visit  Your Care Instructions  A healthy diet is important to manage diabetes. It helps you lose weight (if you need to) and keep it off. It gives you the nutrition and energy your body needs and helps prevent heart disease. But a diet for diabetes does not mean that you have to eat special foods. You can eat what your family eats, including occasional sweets and other favorites. But you do have to pay attention to how often you eat and how much you eat of certain foods. The right plan for you will give you meals that help you keep your blood sugar at healthy levels. Try to eat a variety of foods and to spread carbohydrate throughout the day. Carbohydrate raises blood sugar higher and more quickly than any other nutrient does. Carbohydrate is found in sugar, breads and cereals, fruit, starchy vegetables such as potatoes and corn, and milk and yogurt. You may want to work with a dietitian or diabetes educator to help you plan meals and snacks. A dietitian or diabetes educator also can help you lose weight if that is one of your goals. The following tips can help you enjoy your meals and stay healthy. Follow-up care is a key part of your treatment and safety. Be sure to make and go to all appointments, and call your doctor if you are having problems. Its also a good idea to know your test results and keep a list of the medicines you take. How can you care for yourself at home? · Learn which foods have carbohydrate and how much carbohydrate to eat. A dietitian or diabetes educator can help you learn to keep track of how much carbohydrate you eat. · Spread carbohydrate throughout the day. Eat some carbohydrate at all meals, but do not eat too much at any one time.   · Plan meals to include food from all the food groups. These are the food groups and some example portion sizes:  ¨ Grains: 1 slice of bread (1 ounce), ½ cup of cooked cereal, and 1/3 cup of cooked pasta or rice. These have about 15 grams of carbohydrate in a serving. Choose whole grains such as whole wheat bread or crackers, oatmeal, and brown rice more often than refined grains. ¨ Fruit: 1 small fresh fruit, such as an apple or orange; ½ of a banana; ½ cup of chopped, cooked, or canned fruit; ½ cup of fruit juice; 1 cup of melon or raspberries; and 2 tablespoons of dried fruit. These have about 15 grams of carbohydrate in a serving. ¨ Dairy: 1 cup of nonfat or low-fat milk and 2/3 cup of plain yogurt. These have about 15 grams of carbohydrate in a serving. ¨ Protein foods: Beef, chicken, turkey, fish, eggs, tofu, cheese, cottage cheese, and peanut butter. A serving size of meat is 3 ounces, which is about the size of a deck of cards. Examples of meat substitute serving sizes (equal to 1 ounce of meat) are 1/4 cup of cottage cheese, 1 egg, 1 tablespoon of peanut butter, and ½ cup of tofu. These have very little or no carbohydrate per serving. ¨ Vegetables: Starchy vegetables such as ½ cup of cooked dried beans, peas, potatoes, or corn have about 15 grams of carbohydrate. Nonstarchy vegetables have very little carbohydrate, such as 1 cup of raw leafy vegetables (such as spinach), ½ cup of other vegetables (cooked or chopped), and 3/4 cup of vegetable juice. · Use the plate format to plan meals. It is a good, quick way to make sure that you have a balanced meal. It also helps you spread carbohydrate throughout the day. You divide your plate by types of foods. Put vegetables on half the plate, meat or meat substitutes on one-quarter of the plate, and a grain or starchy vegetable (such as brown rice or a potato) in the final quarter of the plate.  To this you can add a small piece of fruit and 1 cup of milk or yogurt, depending on how much carbohydrate you are supposed to eat at a meal.  · Talk to your dietitian or diabetes educator about ways to add limited amounts of sweets into your meal plan. You can eat these foods now and then, as long as you include the amount of carbohydrate they have in your daily carbohydrate allowance. · If you drink alcohol, limit it to no more than 1 drink a day for women and 2 drinks a day for men. If you are pregnant, no amount of alcohol is known to be safe. · Protein, fat, and fiber do not raise blood sugar as much as carbohydrate does. If you eat a lot of these nutrients in a meal, your blood sugar will rise more slowly than it would otherwise. · Limit saturated fats, such as those from meat and dairy products. Try to replace it with monounsaturated fat, such as olive oil. This is a healthier choice because people who have diabetes are at higher-than-average risk of heart disease. But use a modest amount of olive oil. A tablespoon of olive oil has 14 grams of fat and 120 calories. · Exercise lowers blood sugar. If you take insulin by shots or pump, you can use less than you would if you were not exercising. Keep in mind that timing matters. If you exercise within 1 hour after a meal, your body may need less insulin for that meal than it would if you exercised 3 hours after the meal. Test your blood sugar to find out how exercise affects your need for insulin. · Exercise on most days of the week. Aim for at least 30 minutes. Exercise helps you stay at a healthy weight and helps your body use insulin. Walking is an easy way to get exercise. Gradually increase the amount you walk every day. You also may want to swim, bike, or do other activities. When you eat out  · Learn to estimate the serving sizes of foods that have carbohydrate. If you measure food at home, it will be easier to estimate the amount in a serving of restaurant food.   · If the meal you order has too much carbohydrate (such as potatoes, corn, or baked beans), ask to have a low-carbohydrate food instead. Ask for a salad or green vegetables. · If you use insulin, check your blood sugar before and after eating out to help you plan how much to eat in the future. · If you eat more carbohydrate at a meal than you had planned, take a walk or do other exercise. This will help lower your blood sugar. Where can you learn more? Go to [employer]  Enter D486 in the search box to learn more about \"Nutrition Tips for Diabetes: After Your Visit. \"   © 5122-5607 [employer]wise, Incorporated. Care instructions adapted under license by [facility] (which disclaims liability or warranty for this information). This care instruction is for use with your licensed healthcare professional. If you have questions about a medical condition or this instruction, always ask your healthcare professional. [address] any warranty or liability for your use of this information.   Content Version: 21.0.389568; Current as of: June 4, 2014

## 2019-01-08 LAB
BUN SERPL-MCNC: 9 MG/DL (ref 6–24)
BUN/CREAT SERPL: 10 (ref 9–20)
CALCIUM SERPL-MCNC: 9.5 MG/DL (ref 8.7–10.2)
CHLORIDE SERPL-SCNC: 98 MMOL/L (ref 96–106)
CO2 SERPL-SCNC: 23 MMOL/L (ref 20–29)
CREAT SERPL-MCNC: 0.89 MG/DL (ref 0.76–1.27)
ERYTHROCYTE [DISTWIDTH] IN BLOOD BY AUTOMATED COUNT: 15.5 % (ref 12.3–15.4)
GLUCOSE SERPL-MCNC: 162 MG/DL (ref 65–99)
HCT VFR BLD AUTO: 40.5 % (ref 37.5–51)
HGB BLD-MCNC: 13 G/DL (ref 13–17.7)
MCH RBC QN AUTO: 24.5 PG (ref 26.6–33)
MCHC RBC AUTO-ENTMCNC: 32.1 G/DL (ref 31.5–35.7)
MCV RBC AUTO: 76 FL (ref 79–97)
PLATELET # BLD AUTO: 226 X10E3/UL (ref 150–379)
POTASSIUM SERPL-SCNC: 3.8 MMOL/L (ref 3.5–5.2)
RBC # BLD AUTO: 5.3 X10E6/UL (ref 4.14–5.8)
SODIUM SERPL-SCNC: 140 MMOL/L (ref 134–144)
WBC # BLD AUTO: 10.9 X10E3/UL (ref 3.4–10.8)

## 2019-01-19 DIAGNOSIS — E11.65 TYPE 2 DIABETES MELLITUS WITH HYPERGLYCEMIA, WITH LONG-TERM CURRENT USE OF INSULIN (HCC): ICD-10-CM

## 2019-01-19 DIAGNOSIS — Z79.4 TYPE 2 DIABETES MELLITUS WITH HYPERGLYCEMIA, WITH LONG-TERM CURRENT USE OF INSULIN (HCC): ICD-10-CM

## 2019-01-19 DIAGNOSIS — E78.5 HYPERLIPIDEMIA, UNSPECIFIED HYPERLIPIDEMIA TYPE: ICD-10-CM

## 2019-01-21 RX ORDER — INSULIN DETEMIR 100 [IU]/ML
INJECTION, SOLUTION SUBCUTANEOUS
Qty: 15 ML | Refills: 0 | Status: SHIPPED | OUTPATIENT
Start: 2019-01-21 | End: 2019-03-06 | Stop reason: SDUPTHER

## 2019-01-21 RX ORDER — ROSUVASTATIN CALCIUM 40 MG/1
TABLET, COATED ORAL
Qty: 90 TAB | Refills: 0 | Status: SHIPPED | OUTPATIENT
Start: 2019-01-21 | End: 2019-05-01 | Stop reason: SDUPTHER

## 2019-02-19 ENCOUNTER — OFFICE VISIT (OUTPATIENT)
Dept: INTERNAL MEDICINE CLINIC | Age: 49
End: 2019-02-19

## 2019-02-19 VITALS
TEMPERATURE: 96.9 F | OXYGEN SATURATION: 94 % | SYSTOLIC BLOOD PRESSURE: 133 MMHG | RESPIRATION RATE: 18 BRPM | DIASTOLIC BLOOD PRESSURE: 84 MMHG | BODY MASS INDEX: 39.17 KG/M2 | HEART RATE: 94 BPM | WEIGHT: 315 LBS | HEIGHT: 75 IN

## 2019-02-19 DIAGNOSIS — Z79.4 TYPE 2 DIABETES MELLITUS WITH HYPERGLYCEMIA, WITH LONG-TERM CURRENT USE OF INSULIN (HCC): Primary | ICD-10-CM

## 2019-02-19 DIAGNOSIS — E11.65 TYPE 2 DIABETES MELLITUS WITH HYPERGLYCEMIA, WITH LONG-TERM CURRENT USE OF INSULIN (HCC): Primary | ICD-10-CM

## 2019-02-19 LAB — HBA1C MFR BLD HPLC: 10.3 %

## 2019-02-19 NOTE — PROGRESS NOTES
Pt here for   Chief Complaint   Patient presents with    Complete Physical    Diabetes     1. Have you been to the ER, urgent care clinic since your last visit? Hospitalized since your last visit? No    2. Have you seen or consulted any other health care providers outside of the [address] [place] [address] since your last visit? Include any pap smears or colon screening.  No           Pt denies pain at this time        3 most recent PHQ Screens 2/19/2019   PHQ Not Done -   Little interest or pleasure in doing things Not at all   Feeling down, depressed, irritable, or hopeless Not at all   Total Score PHQ 2 0

## 2019-02-19 NOTE — LETTER
NOTIFICATION RETURN TO WORK / SCHOOL 
 
2/19/2019 9:00 AM 
 
Mr. Gorge Renee [address] [phone] To Whom It May Concern: 
 
Gorge Renee is currently under the care of Lucrecia Chang. He is still on insulin for diabetes but his blood sugars are improving. If there are questions or concerns please have the patient contact our office. Sincerely, Lesa Akins NP

## 2019-02-19 NOTE — PROGRESS NOTES
Subjective: (As above and below)     Chief Complaint   Patient presents with    Complete Physical    Diabetes     Navin Petit is a 50y.o. year old male who presents for DM2      Diabetic Review of Systems - medication compliance: compliant all of the time, diabetic diet compliance: noncompliant some of the time, home glucose monitoring: is performed regularly. He purchased out of pocket the [employer] for continuous glucose monitoring, seems to have a positive affect because he can directly see the correlation between food choices and blood sugar spikes. His insurance also sent him a portion plate and scale and he has been improving portion control. Last summer when his A1c was much better, he was exercising. He has a gym at his work and plans on getting back into this as well    Sleep apnea: wearing cpap nightly    Wt Readings from Last 3 Encounters:   02/19/19 346 lb 1.6 oz (157 kg)   01/07/19 345 lb 1.6 oz (156.5 kg)   12/17/18 348 lb 1.6 oz (157.9 kg)       Reviewed PmHx, RxHx, FmHx, SocHx, AllgHx and updated in chart.   Family History   Problem Relation Age of Onset    Diabetes Mother         cancer, liver? hep c    Hypertension Mother     Hypertension Father     Diabetes Brother     Hypertension Brother     Hypertension Maternal Uncle     Diabetes Maternal Grandmother     Diabetes Paternal Grandmother     Hypertension Maternal Uncle     Hypertension Maternal Uncle     Diabetes Paternal Uncle        Past Medical History:   Diagnosis Date    Asthma     Diabetes ([address]     HTN (hypertension) 3/30/2010    Hypertension     Other and unspecified hyperlipidemia 3/30/2010    Sleep apnea     Type II or unspecified type diabetes mellitus without mention of complication, uncontrolled 3/30/2010      Social History     Socioeconomic History    Marital status: SINGLE     Spouse name: Not on file    Number of children: Not on file    Years of education: Not on file    Highest education level: Not on file   Tobacco Use    Smoking status: Current Some Day Smoker     Packs/day: 0.50     Years: 22.00     Pack years: 11.00     Types: Cigars    Smokeless tobacco: Never Used    Tobacco comment: black and mild   Substance and Sexual Activity    Alcohol use: Yes     Alcohol/week: 6.0 oz     Types: 10 Standard drinks or equivalent per week     Comment: rare    Drug use: No    Sexual activity: Yes     Partners: Female     Birth control/protection: Condom   Social History Narrative    6/6/17: works at [employer]          Current Outpatient Medications   Medication Sig    LEVEMIR FLEXTOUCH U-100 INSULN 100 unit/mL (3 mL) inpn ADMINISTER 50 UNITS UNDER THE SKIN EVERY NIGHT FOR DIABETES    rosuvastatin (CRESTOR) 40 mg tablet TAKE 1 TABLET BY MOUTH EVERY NIGHT    liraglutide (VICTOZA) 0.6 mg/0.1 mL (18 mg/3 mL) pnij 1.2 mg by SubCUTAneous route daily. X1 WEEK. IF TOLERATED INCREASE TO 1.2MG DAILY. FOR DIABETES (Patient taking differently: 1.2 mg by SubCUTAneous route daily.)    metFORMIN (GLUCOPHAGE) 1,000 mg tablet Take 1 Tab by mouth two (2) times daily (with meals). FOR DIABETES    Insulin Needles, Disposable, 30 gauge x 1/3\" by SubCUTAneous route two (2) times a day. levamir & victoza    enalapril-hydroCHLOROthiazide (VASERETIC) 10-25 mg tablet TAKE 2 TABLETS BY MOUTH DAILY    VENTOLIN HFA 90 mcg/actuation inhaler INHALE 1 TO 2 PUFFS BY MOUTH EVERY 4 HOURS AS NEEDED FOR WHEEZING    sildenafil citrate (VIAGRA) 100 mg tablet Take 1 Tab by mouth as needed. Once daily    Blood-Glucose Meter monitoring kit Check sugars TID. E11.65. Once touch    Lancets misc Use as directed. Dx: e11.65 check sugars TID    glucose blood VI test strips (ASCENSIA AUTODISC VI, ONE TOUCH ULTRA TEST VI) strip E11.65. Check sugar TID    aspirin delayed-release 81 mg tablet Take 1 Tab by mouth daily.  flash glucose scanning reader (FREESTYLE FERMIN 14 DAY READER) misc 1 Each by Does Not Apply route Once every 2 weeks.      No current facility-administered medications for this visit. Review of Systems:   Constitutional:    Negative for fever and chills, negative diaphoresis. HEENT:              Negative for neck pain and stiffness. Eyes:                  Negative for visual disturbance, itching, redness or discharge. Respiratory:        Negative for cough and shortness of breath. Cardiovascular:  Negative for chest pain and palpitations. Gastrointestinal: Negative for nausea, vomiting, abdominal pain, diarrhea or constipation. Genitourinary:     Negative for dysuria and frequency. Musculoskeletal: Negative for falls, tenderness and swelling. Skin:                    Negative for rash, masses or lesions. Neurological:       Negative for dizzyness, seizure, loss of consciousness, weakness and numbness. Objective:     Vitals:    02/19/19 0839   BP: 133/84   Pulse: 94   Resp: 18   Temp: 96.9 °F (36.1 °C)   TempSrc: Oral   SpO2: 94%   Weight: 346 lb 1.6 oz (157 kg)   Height: 6' 3\" (1.905 m)     Results for orders placed or performed in visit on 02/19/19   AMB POC HEMOGLOBIN A1C   Result Value Ref Range    Hemoglobin A1c (POC) 10.3 %         Physical Examination: General appearance - alert, well appearing, and in no distress and overweight  Chest - clear to auscultation, no wheezes, rales or rhonchi, symmetric air entry  Heart - normal rate, regular rhythm, normal S1, S2, no murmurs, rubs, clicks or gallops  Extremities - no pedal edema noted      Assessment/ Plan:   Follow-up Disposition:  Return in about 3 months (around 5/19/2019) for dm. 1. Type 2 diabetes mellitus with hyperglycemia, with long-term current use of insulin ([address]  Not at goal but improving, pt has a plan in place for exercise and diet  - AMB POC HEMOGLOBIN A1C        I have discussed the diagnosis with the patient and the intended plan as seen in the above orders.   The patient has received an after-visit summary and questions were answered concerning future plans. Pt conveyed understanding of plan. Medication Side Effects and Warnings were discussed with patient: yes  Patient Labs were reviewed: yes  Patient Past Records were reviewed:  yes    Kathryn Merino.  Dalia Bridges NP

## 2019-03-06 DIAGNOSIS — I10 ESSENTIAL HYPERTENSION: ICD-10-CM

## 2019-03-06 DIAGNOSIS — Z79.4 TYPE 2 DIABETES MELLITUS WITH HYPERGLYCEMIA, WITH LONG-TERM CURRENT USE OF INSULIN (HCC): ICD-10-CM

## 2019-03-06 DIAGNOSIS — E11.65 TYPE 2 DIABETES MELLITUS WITH HYPERGLYCEMIA, WITH LONG-TERM CURRENT USE OF INSULIN (HCC): ICD-10-CM

## 2019-03-06 RX ORDER — ENALAPRIL MALEATE AND HYDROCHLOROTHIAZIDE 10; 25 MG/1; MG/1
TABLET ORAL
Qty: 180 TAB | Refills: 0 | Status: SHIPPED | OUTPATIENT
Start: 2019-03-06 | End: 2019-06-04 | Stop reason: SDUPTHER

## 2019-03-06 RX ORDER — INSULIN DETEMIR 100 [IU]/ML
INJECTION, SOLUTION SUBCUTANEOUS
Qty: 15 ML | Refills: 0 | Status: SHIPPED | OUTPATIENT
Start: 2019-03-06 | End: 2019-03-28 | Stop reason: SDUPTHER

## 2019-03-28 DIAGNOSIS — N52.1 ERECTILE DYSFUNCTION DUE TO DISEASES CLASSIFIED ELSEWHERE: ICD-10-CM

## 2019-03-28 DIAGNOSIS — Z79.4 TYPE 2 DIABETES MELLITUS WITH HYPERGLYCEMIA, WITH LONG-TERM CURRENT USE OF INSULIN (HCC): ICD-10-CM

## 2019-03-28 DIAGNOSIS — E11.65 TYPE 2 DIABETES MELLITUS WITH HYPERGLYCEMIA, WITH LONG-TERM CURRENT USE OF INSULIN (HCC): ICD-10-CM

## 2019-03-28 RX ORDER — FLASH GLUCOSE SENSOR
KIT MISCELLANEOUS
Qty: 1 KIT | Refills: 11 | Status: SHIPPED | OUTPATIENT
Start: 2019-03-28 | End: 2019-05-29 | Stop reason: ALTCHOICE

## 2019-03-28 RX ORDER — SILDENAFIL 100 MG/1
TABLET, FILM COATED ORAL
Qty: 12 TAB | Refills: 0 | Status: SHIPPED | OUTPATIENT
Start: 2019-03-28 | End: 2019-05-20 | Stop reason: SDUPTHER

## 2019-03-28 RX ORDER — INSULIN DETEMIR 100 [IU]/ML
INJECTION, SOLUTION SUBCUTANEOUS
Qty: 15 ML | Refills: 0 | Status: SHIPPED | OUTPATIENT
Start: 2019-03-28 | End: 2019-05-01 | Stop reason: SDUPTHER

## 2019-05-01 DIAGNOSIS — E11.65 TYPE 2 DIABETES MELLITUS WITH HYPERGLYCEMIA, WITH LONG-TERM CURRENT USE OF INSULIN (HCC): ICD-10-CM

## 2019-05-01 DIAGNOSIS — E78.5 HYPERLIPIDEMIA, UNSPECIFIED HYPERLIPIDEMIA TYPE: ICD-10-CM

## 2019-05-01 DIAGNOSIS — Z79.4 TYPE 2 DIABETES MELLITUS WITH HYPERGLYCEMIA, WITH LONG-TERM CURRENT USE OF INSULIN (HCC): ICD-10-CM

## 2019-05-01 RX ORDER — INSULIN DETEMIR 100 [IU]/ML
INJECTION, SOLUTION SUBCUTANEOUS
Qty: 15 ML | Refills: 0 | Status: SHIPPED | OUTPATIENT
Start: 2019-05-01 | End: 2019-06-04 | Stop reason: SDUPTHER

## 2019-05-01 RX ORDER — ROSUVASTATIN CALCIUM 40 MG/1
TABLET, COATED ORAL
Qty: 90 TAB | Refills: 0 | Status: SHIPPED | OUTPATIENT
Start: 2019-05-01 | End: 2019-08-09 | Stop reason: SDUPTHER

## 2019-05-20 DIAGNOSIS — N52.1 ERECTILE DYSFUNCTION DUE TO DISEASES CLASSIFIED ELSEWHERE: ICD-10-CM

## 2019-05-20 RX ORDER — ALBUTEROL SULFATE 90 UG/1
AEROSOL, METERED RESPIRATORY (INHALATION)
Qty: 1 INHALER | Refills: 2 | Status: SHIPPED | OUTPATIENT
Start: 2019-05-20 | End: 2019-09-12 | Stop reason: SDUPTHER

## 2019-05-20 RX ORDER — SILDENAFIL 100 MG/1
100 TABLET, FILM COATED ORAL
Qty: 12 TAB | Refills: 1 | Status: SHIPPED | OUTPATIENT
Start: 2019-05-20 | End: 2019-08-09 | Stop reason: SDUPTHER

## 2019-05-29 ENCOUNTER — OFFICE VISIT (OUTPATIENT)
Dept: INTERNAL MEDICINE CLINIC | Age: 49
End: 2019-05-29

## 2019-05-29 VITALS
WEIGHT: 315 LBS | RESPIRATION RATE: 18 BRPM | HEIGHT: 75 IN | TEMPERATURE: 97.6 F | SYSTOLIC BLOOD PRESSURE: 120 MMHG | DIASTOLIC BLOOD PRESSURE: 75 MMHG | HEART RATE: 96 BPM | OXYGEN SATURATION: 96 % | BODY MASS INDEX: 39.17 KG/M2

## 2019-05-29 DIAGNOSIS — E55.9 VITAMIN D DEFICIENCY: ICD-10-CM

## 2019-05-29 DIAGNOSIS — G47.33 OBSTRUCTIVE SLEEP APNEA SYNDROME: ICD-10-CM

## 2019-05-29 DIAGNOSIS — E66.01 OBESITY, MORBID (HCC): ICD-10-CM

## 2019-05-29 DIAGNOSIS — Z79.4 TYPE 2 DIABETES MELLITUS WITHOUT COMPLICATION, WITH LONG-TERM CURRENT USE OF INSULIN (HCC): ICD-10-CM

## 2019-05-29 DIAGNOSIS — E11.9 TYPE 2 DIABETES MELLITUS WITHOUT COMPLICATION, WITH LONG-TERM CURRENT USE OF INSULIN (HCC): ICD-10-CM

## 2019-05-29 DIAGNOSIS — Z00.00 ROUTINE PHYSICAL EXAMINATION: Primary | ICD-10-CM

## 2019-05-29 DIAGNOSIS — Z91.09 ENVIRONMENTAL ALLERGIES: ICD-10-CM

## 2019-05-29 LAB — HBA1C MFR BLD HPLC: 8.5 %

## 2019-05-29 RX ORDER — CETIRIZINE HCL 10 MG
10 TABLET ORAL
Qty: 30 TAB | Refills: 3 | Status: SHIPPED | OUTPATIENT
Start: 2019-05-29 | End: 2020-05-04

## 2019-05-29 NOTE — PROGRESS NOTES
Pt here for   Chief Complaint   Patient presents with    Complete Physical    Cough     x one month     1. Have you been to the ER, urgent care clinic since your last visit? Hospitalized since your last visit? No    2. Have you seen or consulted any other health care providers outside of the [address] [place] [address] since your last visit? Include any pap smears or colon screening.  No         Pt denies pain at this time      3 most recent PHQ Screens 5/29/2019   PHQ Not Done -   Little interest or pleasure in doing things Not at all   Feeling down, depressed, irritable, or hopeless Not at all   Total Score PHQ 2 0

## 2019-05-29 NOTE — PROGRESS NOTES
Subjective: (As above and below)     Chief Complaint   Patient presents with    Complete Physical    Cough     x one month     Esthela Nurse is a 52y.o. year old male who presents for DM2, HTN and work physical.    Diabetic Review of Systems - medication compliance: compliant all of the time, diabetic diet compliance: noncompliant some of the time, home glucose monitoring: is performed sporadically- was using the R-Squared cecil but kept sweating it out. Usually sugars in the 130-180's    Eye exam: UTD    Dental: due    Hypertension ROS:  taking medications as instructed, no medication side effects noted, no TIAs, no chest pain on exertion, no dyspnea on exertion, no swelling of ankles    Weight: has been going to the gym more- states he feels well. No chest pain, SILVERMAN- started at 1 mile on treadmill now can do 3 miles! . But had 1 week vacation and had diet cheats    Wt Readings from Last 3 Encounters:   05/29/19 340 lb 1.6 oz (154.3 kg)   02/19/19 346 lb 1.6 oz (157 kg)   01/07/19 345 lb 1.6 oz (156.5 kg)     Smoking: black and milds, has cut back some since recent cough    Sleep apnea: is overdue for sleep medicine appt. He is wearing his cpap nightly, is cleaning it but feels that it may be fitting improperly. Cough: x1 month, started dry but now productive/yellow. No fevers, wheezing. Has been improving some. No sinusitis. Often wakes up with eye mucous and runny nose. Reviewed PmHx, RxHx, FmHx, SocHx, AllgHx and updated in chart.   Family History   Problem Relation Age of Onset    Diabetes Mother         cancer, liver? hep c    Hypertension Mother     Hypertension Father     Diabetes Brother     Hypertension Brother     Hypertension Maternal Uncle     Diabetes Maternal Grandmother     Diabetes Paternal Grandmother     Hypertension Maternal Uncle     Hypertension Maternal Uncle     Diabetes Paternal Uncle        Past Medical History:   Diagnosis Date    Asthma     Diabetes ([address]     HTN (hypertension) 3/30/2010    Hypertension     Other and unspecified hyperlipidemia 3/30/2010    Sleep apnea     Type II or unspecified type diabetes mellitus without mention of complication, uncontrolled 3/30/2010      Social History     Socioeconomic History    Marital status: SINGLE     Spouse name: Not on file    Number of children: Not on file    Years of education: Not on file    Highest education level: Not on file   Tobacco Use    Smoking status: Current Some Day Smoker     Packs/day: 0.50     Years: 22.00     Pack years: 11.00     Types: Cigars    Smokeless tobacco: Never Used    Tobacco comment: black and mild   Substance and Sexual Activity    Alcohol use: Yes     Alcohol/week: 6.0 oz     Types: 10 Standard drinks or equivalent per week     Comment: rare    Drug use: No    Sexual activity: Yes     Partners: Female     Birth control/protection: Condom   Social History Narrative    6/6/17: works at [employer]          Current Outpatient Medications   Medication Sig    albuterol (PROVENTIL HFA, VENTOLIN HFA, PROAIR HFA) 90 mcg/actuation inhaler INHALE 1 TO 2 PUFFS BY MOUTH EVERY 4 HOURS AS NEEDED FOR WHEEZING    sildenafil citrate (VIAGRA) 100 mg tablet Take 1 Tab by mouth daily as needed (ED).  rosuvastatin (CRESTOR) 40 mg tablet TAKE 1 TABLET BY MOUTH EVERY NIGHT    LEVEMIR FLEXTOUCH U-100 INSULN 100 unit/mL (3 mL) inpn ADMINISTER 50 UNITS UNDER THE SKIN EVERY NIGHT FOR DIABETES    enalapril-hydroCHLOROthiazide (VASERETIC) 10-25 mg tablet TAKE 2 TABLETS BY MOUTH DAILY    liraglutide (VICTOZA) 0.6 mg/0.1 mL (18 mg/3 mL) pnij 1.2 mg by SubCUTAneous route daily. X1 WEEK. IF TOLERATED INCREASE TO 1.2MG DAILY. FOR DIABETES (Patient taking differently: 1.2 mg by SubCUTAneous route daily.)    metFORMIN (GLUCOPHAGE) 1,000 mg tablet Take 1 Tab by mouth two (2) times daily (with meals). FOR DIABETES    Insulin Needles, Disposable, 30 gauge x 1/3\" by SubCUTAneous route two (2) times a day. levamir & victoza    Blood-Glucose Meter monitoring kit Check sugars TID. E11.65. Once touch    Lancets misc Use as directed. Dx: e11.65 check sugars TID    glucose blood VI test strips (ASCENSIA AUTODISC VI, ONE TOUCH ULTRA TEST VI) strip E11.65. Check sugar TID    aspirin delayed-release 81 mg tablet Take 1 Tab by mouth daily. No current facility-administered medications for this visit. Review of Systems:   Constitutional:    Negative for fever and chills, negative diaphoresis. HEENT:              Negative for neck pain and stiffness. Eyes:                  Negative for visual disturbance, itching, redness or discharge. Respiratory:        Negative for cough and shortness of breath. Cardiovascular:  Negative for chest pain and palpitations. Gastrointestinal: Negative for nausea, vomiting, abdominal pain, diarrhea or constipation. Genitourinary:     Negative for dysuria and frequency. Musculoskeletal: Negative for falls, tenderness and swelling. Skin:                    Negative for rash, masses or lesions. Neurological:       Negative for dizzyness, seizure, loss of consciousness, weakness and numbness.      Objective:     Vitals:    05/29/19 0845   BP: 120/75   Pulse: 96   Resp: 18   Temp: 97.6 °F (36.4 °C)   TempSrc: Oral   SpO2: 96%   Weight: 340 lb 1.6 oz (154.3 kg)   Height: 6' 3\" (1.905 m)       Results for orders placed or performed in visit on 02/19/19   AMB POC HEMOGLOBIN A1C   Result Value Ref Range    Hemoglobin A1c (POC) 10.3 %         Physical Examination: General appearance - alert, well appearing, and in no distress and overweight  Eyes - pupils equal and reactive, extraocular eye movements intact  Ears - bilateral TM's and external ear canals normal  Nose - normal and patent, no erythema, discharge or polyps  Mouth - mucous membranes moist, pharynx normal without lesions  Chest - clear to auscultation, no wheezes, rales or rhonchi, symmetric air entry  Heart - normal rate and regular rhythm  Extremities - no pedal edema noted      Assessment/ Plan:     1. Routine physical examination  Declines other STI tests  - HIV 1/2 AG/AB, 4TH GENERATION,W RFLX CONFIRM  - T PALLIDUM AB    2. Obesity, morbid ([facility] [address]  I have reviewed/discussed the above normal BMI with the patient. I have recommended the following interventions: dietary management education, guidance, and counseling and encourage exercise . Ashlee Merchant 3. Type 2 diabetes mellitus without complication, with long-term current use of insulin (HCC)  Much improved! Discussed increase in victoza versus get back on track w/ diet/workout since vacation, he states he is very motivated to work on lifestyle changes. He will check am sugars - can increase victoza if getting high fasting values. .  - AMB POC HEMOGLOBIN L6A  - METABOLIC PANEL, BASIC    4. Obstructive sleep apnea syndrome    - SLEEP MEDICINE REFERRAL    5. Vitamin D deficiency    - VITAMIN D, 25 HYDROXY; Future    6. Environmental allergies  F/u if cough INI  - cetirizine (ZYRTEC) 10 mg tablet; Take 1 Tab by mouth daily as needed for Allergies. Dispense: 30 Tab; Refill: 3          I have discussed the diagnosis with the patient and the intended plan as seen in the above orders. The patient has received an after-visit summary and questions were answered concerning future plans. Pt conveyed understanding of plan. Medication Side Effects and Warnings were discussed with patient: yes  Patient Labs were reviewed: yes  Patient Past Records were reviewed:  yes    Umair Anderson.  Jenn Shaffer NP

## 2019-05-29 NOTE — PATIENT INSTRUCTIONS
Goal blood sugar in the morning is around 120-130. We have room to increase the victoza but lets see if you can get back on track with diet/exercise! You are doing great! Nutrition Tips for Diabetes: After Your Visit Your Care Instructions A healthy diet is important to manage diabetes. It helps you lose weight (if you need to) and keep it off. It gives you the nutrition and energy your body needs and helps prevent heart disease. But a diet for diabetes does not mean that you have to eat special foods. You can eat what your family eats, including occasional sweets and other favorites. But you do have to pay attention to how often you eat and how much you eat of certain foods. The right plan for you will give you meals that help you keep your blood sugar at healthy levels. Try to eat a variety of foods and to spread carbohydrate throughout the day. Carbohydrate raises blood sugar higher and more quickly than any other nutrient does. Carbohydrate is found in sugar, breads and cereals, fruit, starchy vegetables such as potatoes and corn, and milk and yogurt. You may want to work with a dietitian or diabetes educator to help you plan meals and snacks. A dietitian or diabetes educator also can help you lose weight if that is one of your goals. The following tips can help you enjoy your meals and stay healthy. Follow-up care is a key part of your treatment and safety. Be sure to make and go to all appointments, and call your doctor if you are having problems. Its also a good idea to know your test results and keep a list of the medicines you take. How can you care for yourself at home? · Learn which foods have carbohydrate and how much carbohydrate to eat. A dietitian or diabetes educator can help you learn to keep track of how much carbohydrate you eat. · Spread carbohydrate throughout the day. Eat some carbohydrate at all meals, but do not eat too much at any one time. · Plan meals to include food from all the food groups. These are the food groups and some example portion sizes: ¨ Grains: 1 slice of bread (1 ounce), ½ cup of cooked cereal, and 1/3 cup of cooked pasta or rice. These have about 15 grams of carbohydrate in a serving. Choose whole grains such as whole wheat bread or crackers, oatmeal, and brown rice more often than refined grains. ¨ Fruit: 1 small fresh fruit, such as an apple or orange; ½ of a banana; ½ cup of chopped, cooked, or canned fruit; ½ cup of fruit juice; 1 cup of melon or raspberries; and 2 tablespoons of dried fruit. These have about 15 grams of carbohydrate in a serving. ¨ Dairy: 1 cup of nonfat or low-fat milk and 2/3 cup of plain yogurt. These have about 15 grams of carbohydrate in a serving. ¨ Protein foods: Beef, chicken, turkey, fish, eggs, tofu, cheese, cottage cheese, and peanut butter. A serving size of meat is 3 ounces, which is about the size of a deck of cards. Examples of meat substitute serving sizes (equal to 1 ounce of meat) are 1/4 cup of cottage cheese, 1 egg, 1 tablespoon of peanut butter, and ½ cup of tofu. These have very little or no carbohydrate per serving. ¨ Vegetables: Starchy vegetables such as ½ cup of cooked dried beans, peas, potatoes, or corn have about 15 grams of carbohydrate. Nonstarchy vegetables have very little carbohydrate, such as 1 cup of raw leafy vegetables (such as spinach), ½ cup of other vegetables (cooked or chopped), and 3/4 cup of vegetable juice. · Use the plate format to plan meals. It is a good, quick way to make sure that you have a balanced meal. It also helps you spread carbohydrate throughout the day. You divide your plate by types of foods. Put vegetables on half the plate, meat or meat substitutes on one-quarter of the plate, and a grain or starchy vegetable (such as brown rice or a potato) in the final quarter of the plate.  To this you can add a small piece of fruit and 1 cup of milk or yogurt, depending on how much carbohydrate you are supposed to eat at a meal. 
· Talk to your dietitian or diabetes educator about ways to add limited amounts of sweets into your meal plan. You can eat these foods now and then, as long as you include the amount of carbohydrate they have in your daily carbohydrate allowance. · If you drink alcohol, limit it to no more than 1 drink a day for women and 2 drinks a day for men. If you are pregnant, no amount of alcohol is known to be safe. · Protein, fat, and fiber do not raise blood sugar as much as carbohydrate does. If you eat a lot of these nutrients in a meal, your blood sugar will rise more slowly than it would otherwise. · Limit saturated fats, such as those from meat and dairy products. Try to replace it with monounsaturated fat, such as olive oil. This is a healthier choice because people who have diabetes are at higher-than-average risk of heart disease. But use a modest amount of olive oil. A tablespoon of olive oil has 14 grams of fat and 120 calories. · Exercise lowers blood sugar. If you take insulin by shots or pump, you can use less than you would if you were not exercising. Keep in mind that timing matters. If you exercise within 1 hour after a meal, your body may need less insulin for that meal than it would if you exercised 3 hours after the meal. Test your blood sugar to find out how exercise affects your need for insulin. · Exercise on most days of the week. Aim for at least 30 minutes. Exercise helps you stay at a healthy weight and helps your body use insulin. Walking is an easy way to get exercise. Gradually increase the amount you walk every day. You also may want to swim, bike, or do other activities. When you eat out · Learn to estimate the serving sizes of foods that have carbohydrate. If you measure food at home, it will be easier to estimate the amount in a serving of restaurant food. · If the meal you order has too much carbohydrate (such as potatoes, corn, or baked beans), ask to have a low-carbohydrate food instead. Ask for a salad or green vegetables. · If you use insulin, check your blood sugar before and after eating out to help you plan how much to eat in the future. · If you eat more carbohydrate at a meal than you had planned, take a walk or do other exercise. This will help lower your blood sugar. Where can you learn more? Go to [employer] Enter O124 in the search box to learn more about \"Nutrition Tips for Diabetes: After Your Visit. \"  
© 1098-0950 [employer]. Care instructions adapted under license by [employer] (which disclaims liability or warranty for this information). This care instruction is for use with your licensed healthcare professional. If you have questions about a medical condition or this instruction, always ask your healthcare professional. [place] [address] any warranty or liability for your use of this information. Content Version: 01.2.954326; Current as of: June 4, 2014

## 2019-05-30 LAB
PSA SERPL-MCNC: 0.9 NG/ML (ref 0–4)
REFLEX CRITERIA: NORMAL

## 2019-05-31 PROBLEM — R79.89 LOW VITAMIN D LEVEL: Status: ACTIVE | Noted: 2019-05-31

## 2019-05-31 LAB
25(OH)D3+25(OH)D2 SERPL-MCNC: 12.8 NG/ML (ref 30–100)
BUN SERPL-MCNC: 15 MG/DL (ref 6–24)
BUN/CREAT SERPL: 19 (ref 9–20)
CALCIUM SERPL-MCNC: 9.8 MG/DL (ref 8.7–10.2)
CHLORIDE SERPL-SCNC: 103 MMOL/L (ref 96–106)
CO2 SERPL-SCNC: 23 MMOL/L (ref 20–29)
CREAT SERPL-MCNC: 0.78 MG/DL (ref 0.76–1.27)
GLUCOSE SERPL-MCNC: 160 MG/DL (ref 65–99)
HIV 1+2 AB+HIV1 P24 AG SERPL QL IA: NON REACTIVE
POTASSIUM SERPL-SCNC: 4.2 MMOL/L (ref 3.5–5.2)
SODIUM SERPL-SCNC: 142 MMOL/L (ref 134–144)
T PALLIDUM AB SER QL IA: NEGATIVE

## 2019-06-19 DIAGNOSIS — Z79.4 TYPE 2 DIABETES MELLITUS WITH HYPERGLYCEMIA, WITH LONG-TERM CURRENT USE OF INSULIN (HCC): ICD-10-CM

## 2019-06-19 DIAGNOSIS — E11.65 TYPE 2 DIABETES MELLITUS WITH HYPERGLYCEMIA, WITH LONG-TERM CURRENT USE OF INSULIN (HCC): ICD-10-CM

## 2019-06-19 RX ORDER — LIRAGLUTIDE 6 MG/ML
INJECTION SUBCUTANEOUS
Qty: 15 ML | Refills: 0 | Status: SHIPPED | OUTPATIENT
Start: 2019-06-19 | End: 2019-08-24 | Stop reason: SDUPTHER

## 2019-06-25 ENCOUNTER — OFFICE VISIT (OUTPATIENT)
Dept: SLEEP MEDICINE | Age: 49
End: 2019-06-25

## 2019-06-25 VITALS
OXYGEN SATURATION: 95 % | HEIGHT: 75 IN | SYSTOLIC BLOOD PRESSURE: 157 MMHG | DIASTOLIC BLOOD PRESSURE: 80 MMHG | BODY MASS INDEX: 39.17 KG/M2 | HEART RATE: 93 BPM | WEIGHT: 315 LBS | RESPIRATION RATE: 20 BRPM

## 2019-06-25 DIAGNOSIS — G47.33 OSA (OBSTRUCTIVE SLEEP APNEA): Primary | ICD-10-CM

## 2019-06-25 NOTE — PATIENT INSTRUCTIONS

## 2019-06-25 NOTE — PROGRESS NOTES
[address] [facility] [place]. [place], [address]  Tel.  [phone]  Fax. [address] [facility] [address]  [place], [address] [facility]  Tel.  [phone]  Fax. [phone] [address]  Tel.  [phone]  Fax. [phone]       Chief Complaint       Chief Complaint   Patient presents with    Sleep Problem     Np Hx of FOSTER, on pap therapy- bring machine        HPI      Herber Michael is 52 y.o. male seen for evaluation of a sleep disorder. He had an initial evaluation in 2015 demonstrating severe sleep disordered breathing characterized by an AHI of 33/h associated with minimal SaO2 of 60%. He was started on APAP 4-20 cm changed to 6-12 cm. He has not had supplies updated during this time. Compliance data demonstrated that during the past 90 days, APAP used during 28 days with CMS compliance criteria 16%. Average AHI 1.9/h. Average daily usage is 3.85 hours. He notes that he is feeling better when using APAP. Liberty Sleepiness Score: 6       Allergies   Allergen Reactions    Diazepam Other (comments)     Caused hallucinations, paranoia    Shellfish Derived Angioedema       Current Outpatient Medications   Medication Sig Dispense Refill    VICTOZA 2-ROSSANA 0.6 mg/0.1 mL (18 mg/3 mL) pnij INJECT 0.6 MG SUBCUTANEOUS EVERY DAY FOR 7 DAYS. IF TOLERATED INCREASE TO 1.2 MG EVERY DAY FOR DIABETES 15 mL 0    enalapril-hydroCHLOROthiazide (VASERETIC) 10-25 mg tablet TAKE 2 TABLETS BY MOUTH DAILY 180 Tab 0    LEVEMIR FLEXTOUCH U-100 INSULN 100 unit/mL (3 mL) inpn ADMINISTER 50 UNITS UNDER THE SKIN EVERY NIGHT FOR DIABETES 15 mL 3    cetirizine (ZYRTEC) 10 mg tablet Take 1 Tab by mouth daily as needed for Allergies.  30 Tab 3    albuterol (PROVENTIL HFA, VENTOLIN HFA, PROAIR HFA) 90 mcg/actuation inhaler INHALE 1 TO 2 PUFFS BY MOUTH EVERY 4 HOURS AS NEEDED FOR WHEEZING 1 Inhaler 2    sildenafil citrate (VIAGRA) 100 mg tablet Take 1 Tab by mouth daily as needed (ED). 12 Tab 1    rosuvastatin (CRESTOR) 40 mg tablet TAKE 1 TABLET BY MOUTH EVERY NIGHT 90 Tab 0    metFORMIN (GLUCOPHAGE) 1,000 mg tablet Take 1 Tab by mouth two (2) times daily (with meals). FOR DIABETES 180 Tab 1    Insulin Needles, Disposable, 30 gauge x 1/3\" by SubCUTAneous route two (2) times a day. levamir & victoza 1 Package 11    Blood-Glucose Meter monitoring kit Check sugars TID. E11.65. Once touch 1 Kit 0    Lancets misc Use as directed. Dx: e11.65 check sugars TID 1 Each 11    glucose blood VI test strips (ASCENSIA AUTODISC VI, ONE TOUCH ULTRA TEST VI) strip E11.65. Check sugar  Strip 11    aspirin delayed-release 81 mg tablet Take 1 Tab by mouth daily. 30 Tab 11        He  has a past medical history of Asthma, Diabetes ([facility] [address] HTN (hypertension) (3/30/2010), Hypertension, Other and unspecified hyperlipidemia (3/30/2010), Sleep apnea, and Type II or unspecified type diabetes mellitus without mention of complication, uncontrolled (3/30/2010). He  has a past surgical history that includes pr abdomen surgery proc unlisted. He family history includes Diabetes in his brother, maternal grandmother, mother, paternal grandmother, and paternal uncle; Hypertension in his brother, father, maternal uncle, maternal uncle, maternal uncle, and mother. He  reports that he has been smoking cigars. He has a 11.00 pack-year smoking history. He has never used smokeless tobacco. He reports that he drinks about 6.0 oz of alcohol per week. He reports that he does not use drugs. Review of Systems:  Review of Systems   Constitutional: Negative for chills and fever. HENT: Negative for hearing loss and tinnitus. Respiratory: Positive for cough and wheezing. Cardiovascular: Positive for leg swelling. Gastrointestinal: Negative for abdominal pain and heartburn. Genitourinary: Negative for frequency and urgency. Musculoskeletal: Negative for back pain and neck pain.    Skin: Negative for itching and rash. Neurological: Negative for dizziness and headaches. Psychiatric/Behavioral: Negative for depression. Objective:     Visit Vitals  /80 (BP 1 Location: Left arm, BP Patient Position: Sitting)   Pulse 93   Resp 20   Ht 6' 3\" (1.905 m)   Wt 350 lb (158.8 kg)   SpO2 95%   BMI 43.75 kg/m²     Body mass index is 43.75 kg/m². General:   Conversant, cooperative   Eyes:  Pupils equal and reactive, no nystagmus   Oropharynx:   Mallampati score IV, tongue scalloped   Tonsils:   tonsils    Neck:   No carotid bruits; Neck circ. in \"inches\": 18.5   Chest/Lungs:  Clear on auscultation    CVS:  Normal rate, regular rhythm, trace distal edema   Skin:  Warm to touch; no obvious rashes   Neuro:  Speech fluent, face symmetrical, tongue movement normal   Psych:  Normal affect,  normal countenance        Assessment:       ICD-10-CM ICD-9-CM    1. FOSTER (obstructive sleep apnea) G47.33 327.23      Severe sleep disordered breathing, responding to APAP 6-12. The importance of compliance was reviewed. Plan:     No orders of the defined types were placed in this encounter. * Patient has a history and examination consistent with the diagnosis of sleep apnea. * He was provided information on sleep apnea including corresponding risk factors and the importance of proper treatment. * Treatment options if indicated were reviewed today. Instructions:  o The patient would benefit from weight reduction measures. o Do not engage in activities requiring a normal degree of alertness if fatigue is present. o The patient understands that untreated or undertreated sleep apnea could impair judgement and the ability to function normally during the day.  o Call or return if symptoms worsen or persist.  o Compliance review in 30 days. Sahil Clarke MD, FAASM  Electronically signed 06/25/19       This note was created using voice recognition software.  Despite editing, there may be syntax errors. This note will not be viewable in [address]

## 2019-07-06 ENCOUNTER — HOSPITAL ENCOUNTER (EMERGENCY)
Age: 49
Discharge: HOME | End: 2019-07-06
Attending: EMERGENCY MEDICINE
Payer: COMMERCIAL

## 2019-07-06 VITALS
BODY MASS INDEX: 39.17 KG/M2 | OXYGEN SATURATION: 98 % | RESPIRATION RATE: 16 BRPM | DIASTOLIC BLOOD PRESSURE: 96 MMHG | HEART RATE: 96 BPM | TEMPERATURE: 98.4 F | HEIGHT: 75 IN | SYSTOLIC BLOOD PRESSURE: 187 MMHG | WEIGHT: 315 LBS

## 2019-07-06 DIAGNOSIS — R03.0 ELEVATED BLOOD PRESSURE READING: ICD-10-CM

## 2019-07-06 DIAGNOSIS — Z72.0 TOBACCO ABUSE: ICD-10-CM

## 2019-07-06 DIAGNOSIS — S05.01XA ABRASION OF RIGHT CORNEA, INITIAL ENCOUNTER: Primary | ICD-10-CM

## 2019-07-06 DIAGNOSIS — Z71.6 ENCOUNTER FOR SMOKING CESSATION COUNSELING: ICD-10-CM

## 2019-07-06 PROCEDURE — 74011000250 HC RX REV CODE- 250: Performed by: PHYSICIAN ASSISTANT

## 2019-07-06 PROCEDURE — 99283 EMERGENCY DEPT VISIT LOW MDM: CPT

## 2019-07-06 RX ORDER — KETOROLAC TROMETHAMINE 4 MG/ML
1 SOLUTION/ DROPS OPHTHALMIC 4 TIMES DAILY
Qty: 5 ML | Refills: 0 | Status: SHIPPED | OUTPATIENT
Start: 2019-07-06 | End: 2019-07-11

## 2019-07-06 RX ORDER — CIPROFLOXACIN HYDROCHLORIDE 3.5 MG/ML
1 SOLUTION/ DROPS TOPICAL 4 TIMES DAILY
Qty: 2.5 ML | Refills: 0 | Status: SHIPPED | OUTPATIENT
Start: 2019-07-06 | End: 2019-07-11

## 2019-07-06 RX ORDER — TETRACAINE HYDROCHLORIDE 5 MG/ML
1 SOLUTION OPHTHALMIC
Status: COMPLETED | OUTPATIENT
Start: 2019-07-06 | End: 2019-07-06

## 2019-07-06 RX ADMIN — TETRACAINE HYDROCHLORIDE 1 DROP: 5 SOLUTION OPHTHALMIC at 20:50

## 2019-07-06 RX ADMIN — FLUORESCEIN SODIUM 1 STRIP: 1 STRIP OPHTHALMIC at 20:50

## 2019-07-07 NOTE — ED NOTES
Assumed care of patient from triage. Patient c/o right eye irritation starting about an hour PTA. Patient states he was sitting watching TV when his eye became irritated and watery. Patient denies vision changes. Patient denies injury.

## 2019-07-07 NOTE — DISCHARGE INSTRUCTIONS
Patient Education        Corneal Scratches: Care Instructions  Your Care Instructions    The cornea is the clear surface that covers the front of the eye. When a speck of dirt, a wood chip, an insect, or another object flies into your eye, it can cause a painful scratch on the cornea. Wearing contact lenses too long or rubbing your eyes can also scratch the cornea. Small scratches usually heal in a day or two. Deeper scratches may take longer. If you have had a foreign object removed from your eye or you have a corneal scratch, you will need to watch for infection and vision problems while your eye heals. Follow-up care is a key part of your treatment and safety. Be sure to make and go to all appointments, and call your doctor if you are having problems. It's also a good idea to know your test results and keep a list of the medicines you take. How can you care for yourself at home? · The doctor probably used a medicine during your exam to numb your eye. When it wears off in 30 to 60 minutes, your eye pain may come back. Take pain medicines exactly as directed. ? If the doctor gave you a prescription medicine for pain, take it as prescribed. ? If you are not taking a prescription pain medicine, ask your doctor if you can take an over-the-counter medicine. ? Do not take two or more pain medicines at the same time unless the doctor told you to. Many pain medicines have acetaminophen, which is Tylenol. Too much acetaminophen (Tylenol) can be harmful. · Do not rub your injured eye. Rubbing can make it worse. · Use the prescribed eyedrops or ointment as directed. Be sure the dropper or bottle tip is clean. To put in eyedrops or ointment:  ? Tilt your head back, and pull your lower eyelid down with one finger. ? Drop or squirt the medicine inside the lower lid. ? Close your eye for 30 to 60 seconds to let the drops or ointment move around.   ? Do not touch the ointment or dropper tip to your eyelashes or any other surface. · Do not use your contact lens in your hurt eye until your doctor says you can. Also, do not wear eye makeup until your eye has healed. · Do not drive if you have blurred vision. · Bright light may hurt. Sunglasses can help. · To prevent eye injuries in the future, wear safety glasses or goggles when you work with machines or tools, mow the lawn, or ride a bike or motorcycle. When should you call for help? Call your doctor now or seek immediate medical care if:    · You have signs of an eye infection, such as:  ? Pus or thick discharge coming from the eye.  ? Redness or swelling around the eye.  ? A fever.     · You have new or worse eye pain.     · You have vision changes.     · It feels like there is something in your eye.     · Light hurts your eye.    Watch closely for changes in your health, and be sure to contact your doctor if:    · You do not get better as expected. Where can you learn more? Go to http://kimo-cherry.info/. Enter M293 in the search box to learn more about \"Corneal Scratches: Care Instructions. \"  Current as of: July 17, 2018  Content Version: 11.9  © 2246-1714 [employer]. Care instructions adapted under license by [employer] (which disclaims liability or warranty for this information). If you have questions about a medical condition or this instruction, always ask your healthcare professional. [place] [address] any warranty or liability for your use of this information. Patient Education        Elevated Blood Pressure: Care Instructions  Your Care Instructions    Blood pressure is a measure of how hard the blood pushes against the walls of your arteries. It's normal for blood pressure to go up and down throughout the day. But if it stays up over time, you have high blood pressure. Two numbers tell you your blood pressure. The first number is the systolic pressure.  It shows how hard the blood pushes when your heart is pumping. The second number is the diastolic pressure. It shows how hard the blood pushes between heartbeats, when your heart is relaxed and filling with blood. An ideal blood pressure in adults is less than 120/80 (say \"120 over 80\"). High blood pressure is 140/90 or higher. You have high blood pressure if your top number is 140 or higher or your bottom number is 90 or higher, or both. The main test for high blood pressure is simple, fast, and painless. To diagnose high blood pressure, your doctor will test your blood pressure at different times. After testing your blood pressure, your doctor may ask you to test it again when you are home. If you are diagnosed with high blood pressure, you can work with your doctor to make a long-term plan to manage it. Follow-up care is a key part of your treatment and safety. Be sure to make and go to all appointments, and call your doctor if you are having problems. It's also a good idea to know your test results and keep a list of the medicines you take. How can you care for yourself at home? · Do not smoke. Smoking increases your risk for heart attack and stroke. If you need help quitting, talk to your doctor about stop-smoking programs and medicines. These can increase your chances of quitting for good. · Stay at a healthy weight. · Try to limit how much sodium you eat to less than 2,300 milligrams (mg) a day. Your doctor may ask you to try to eat less than 1,500 mg a day. · Be physically active. Get at least 30 minutes of exercise on most days of the week. Walking is a good choice. You also may want to do other activities, such as running, swimming, cycling, or playing tennis or team sports. · Avoid or limit alcohol. Talk to your doctor about whether you can drink any alcohol. · Eat plenty of fruits, vegetables, and low-fat dairy products. Eat less saturated and total fats. · Learn how to check your blood pressure at home.   When should you call for help? Call your doctor now or seek immediate medical care if:  ? · Your blood pressure is much higher than normal (such as 180/110 or higher). ? · You think high blood pressure is causing symptoms such as:  ¨ Severe headache. ¨ Blurry vision. ? Watch closely for changes in your health, and be sure to contact your doctor if:  ? · You do not get better as expected. Where can you learn more? Go to http://kimo-cherry.info/. Enter U749 in the search box to learn more about \"Elevated Blood Pressure: Care Instructions. \"  Current as of: September 21, 2016  Content Version: 11.4  © 0693-0098 [employer]. Care instructions adapted under license by [employer] (which disclaims liability or warranty for this information). If you have questions about a medical condition or this instruction, always ask your healthcare professional. [address] any warranty or liability for your use of this information.

## 2019-07-07 NOTE — ED PROVIDER NOTES
EMERGENCY DEPARTMENT HISTORY AND PHYSICAL EXAM      Date: 7/6/2019  Patient Name: Eddie Montiel    History of Presenting Illness     HPI: Eddie Montiel is a 52 y.o. male with past medical history of diabetes, hypertension, asthma, sleep apnea, presents to the emergency room for right eye pain that started after eating crab legs. Patient says his pain is a 3 out of 10, intermittent pain thats worse when he looks a certain direction, nonradiating, progressively worsening. He denies changes in vision, nausea vomiting, headache, among other associated symptoms. Pertinent social history: Current smoker and drinker    Pertinent surgical history: None    PCP: Keri Londono, NP    Current Outpatient Medications   Medication Sig Dispense Refill    ciprofloxacin HCl (CILOXAN) 0.3 % ophthalmic solution Apply 1 Drop to eye four (4) times daily for 5 days. 2.5 mL 0    ketorolac (ACULAR LS) 0.4 % drop Administer 1 Drop to both eyes four (4) times daily for 5 days. 5 mL 0    VICTOZA 2-ROSSANA 0.6 mg/0.1 mL (18 mg/3 mL) pnij INJECT 0.6 MG SUBCUTANEOUS EVERY DAY FOR 7 DAYS. IF TOLERATED INCREASE TO 1.2 MG EVERY DAY FOR DIABETES 15 mL 0    enalapril-hydroCHLOROthiazide (VASERETIC) 10-25 mg tablet TAKE 2 TABLETS BY MOUTH DAILY 180 Tab 0    LEVEMIR FLEXTOUCH U-100 INSULN 100 unit/mL (3 mL) inpn ADMINISTER 50 UNITS UNDER THE SKIN EVERY NIGHT FOR DIABETES 15 mL 3    cetirizine (ZYRTEC) 10 mg tablet Take 1 Tab by mouth daily as needed for Allergies. 30 Tab 3    albuterol (PROVENTIL HFA, VENTOLIN HFA, PROAIR HFA) 90 mcg/actuation inhaler INHALE 1 TO 2 PUFFS BY MOUTH EVERY 4 HOURS AS NEEDED FOR WHEEZING 1 Inhaler 2    sildenafil citrate (VIAGRA) 100 mg tablet Take 1 Tab by mouth daily as needed (ED). 12 Tab 1    rosuvastatin (CRESTOR) 40 mg tablet TAKE 1 TABLET BY MOUTH EVERY NIGHT 90 Tab 0    metFORMIN (GLUCOPHAGE) 1,000 mg tablet Take 1 Tab by mouth two (2) times daily (with meals).  FOR DIABETES 180 Tab 1    Insulin Needles, Disposable, 30 gauge x 1/3\" by SubCUTAneous route two (2) times a day. levamir & victoza 1 Package 11    Blood-Glucose Meter monitoring kit Check sugars TID. E11.65. Once touch 1 Kit 0    Lancets misc Use as directed. Dx: e11.65 check sugars TID 1 Each 11    glucose blood VI test strips (ASCENSIA AUTODISC VI, ONE TOUCH ULTRA TEST VI) strip E11.65. Check sugar  Strip 11    aspirin delayed-release 81 mg tablet Take 1 Tab by mouth daily. 27 Tab 11       Past History     Past Medical History:  Past Medical History:   Diagnosis Date    Asthma     Diabetes ([facility] [address]     HTN (hypertension) 3/30/2010    Hypertension     Other and unspecified hyperlipidemia 3/30/2010    Sleep apnea     Type II or unspecified type diabetes mellitus without mention of complication, uncontrolled 3/30/2010       Past Surgical History:  Past Surgical History:   Procedure Laterality Date    ABDOMEN SURGERY PROC UNLISTED      hernia repair       Family History:  Family History   Problem Relation Age of Onset    Diabetes Mother         cancer, liver? hep c    Hypertension Mother     Hypertension Father     Diabetes Brother     Hypertension Brother     Hypertension Maternal Uncle     Diabetes Maternal Grandmother     Diabetes Paternal Grandmother     Hypertension Maternal Uncle     Hypertension Maternal Uncle     Diabetes Paternal Uncle        Social History:  Social History     Tobacco Use    Smoking status: Current Some Day Smoker     Packs/day: 0.50     Years: 22.00     Pack years: 11.00     Types: Cigars    Smokeless tobacco: Never Used    Tobacco comment: black and mild   Substance Use Topics    Alcohol use: Yes     Alcohol/week: 6.0 oz     Types: 10 Standard drinks or equivalent per week     Comment: rare    Drug use: No       Allergies:   Allergies   Allergen Reactions    Diazepam Other (comments)     Caused hallucinations, paranoia    Shellfish Derived Angioedema         Review of Systems   Review of Systems   Constitutional: Negative for chills and fever. HENT: Negative for congestion and sore throat. Eyes: Positive for pain and redness. Negative for photophobia, discharge and visual disturbance. Respiratory: Negative for shortness of breath and wheezing. Cardiovascular: Negative for chest pain. Gastrointestinal: Negative for abdominal pain, nausea and vomiting. Endocrine: Negative for polydipsia, polyphagia and polyuria. Genitourinary: Negative for frequency, genital sores and urgency. Musculoskeletal: Negative for back pain, myalgias and neck pain. Skin: Negative for rash and wound. Neurological: Negative for light-headedness and headaches. Physical Exam     Vitals:    07/06/19 1958   BP: (!) 187/96   Pulse: 96   Resp: 16   Temp: 98.4 °F (36.9 °C)   SpO2: 98%   Weight: (!) 160.1 kg (352 lb 15.3 oz)   Height: 6' 3\" (1.905 m)     Physical Exam   Constitutional: He is oriented to person, place, and time. He appears well-developed and well-nourished. HENT:   Head: Normocephalic and atraumatic. Eyes: Pupils are equal, round, and reactive to light. EOM are normal.   Conjunctive injected on right eye, intermittent tearing, no purulent drainage   Cardiovascular: Normal rate, regular rhythm and normal heart sounds. Pulmonary/Chest: Effort normal and breath sounds normal.   Neurological: He is alert and oriented to person, place, and time. Skin: Skin is warm and dry. Psychiatric: He has a normal mood and affect. His behavior is normal. Judgment and thought content normal.   Nursing note and vitals reviewed. Diagnostic Study Results     Labs -   No results found for this or any previous visit (from the past 12 hour(s)). Radiologic Studies -   No orders to display     CT Results  (Last 48 hours)    None        CXR Results  (Last 48 hours)    None            Medical Decision Making   I am the first provider for this patient.     I reviewed the vital signs, available nursing notes, past medical history, past surgical history, social history  Pulse Oximetry Analysis - 98% on RA      ED Course and Progress notes:   Initial assessment performed. The patients presenting problems have been discussed, and they are in agreement with the care plan formulated and outlined with them. I have encouraged them to ask questions as they arise throughout their visit. On re evaluation pt is resting comfortably, is requesting discharge, and has no new complaints, changes, or physical findings. Procedures:  Procedures    Critical Care Time: none    Vital Signs-Reviewed the patient's vital signs. Vitals:    07/06/19 1958   BP: (!) 187/96   BP 1 Location: Left arm   BP Patient Position: At rest   Pulse: 96   Resp: 16   Temp: 98.4 °F (36.9 °C)   SpO2: 98%   Weight: (!) 160.1 kg (352 lb 15.3 oz)   Height: 6' 3\" (1.905 m)       Medications Administered During ED Course  Medications   fluorescein (FUL-ANGE) 1 mg ophthalmic strip 1 Strip (1 Strip Right Eye Given by Provider 7/6/19 2050)   tetracaine HCl (PF) (PONTOCAINE) 0.5 % ophthalmic solution 1 Drop (1 Drop Right Eye Given by Provider 7/6/19 2050)       HYPERTENSION COUNSELING  Patient denies chest pain, headache, shortness of breath,  sx's, abd pain. Patient is made aware of their elevated blood pressure and is instructed to follow up this week with their Primary Care for a recheck. Patient is counseled regarding consequences of chronic, uncontrolled hypertension including kidney disease, heart disease, stroke or even death. Patient states their understanding and agrees to follow up this week. SMOKING CESSATION  The patient was counseled on the dangers of tobacco use, and was advised to quit. Reviewed strategies to maximize success, including removing cigarettes and smoking materials from environment. Disposition:  D/c home    DISCHARGE NOTE:   I Counseled the patient on diagnosis and care plan.  All available lab and imaging results have been reviewed by me and were discussed with the patient, including all incidental findings. The likelihood of other entities in the differential is insufficient to justify any further testing for them. This was explained to the patient. Patient agrees with plan and agrees to follow up with ophthalmology as recommended, or return to the ED immediately if their symptoms worsen. All medications were reviewed with the patient. All of pt's questions and concerns were addressed. The patient was advised that new or worsening symptoms would require further evaluation and should prompt immediate return to the Emergency Department. Discharge instructions have been provided and explained to the patient, along with reasons to return to the ED. Patient voices understanding and is agreeable with the plan for discharge. Patient is ready to go home. Follow-up Information     Follow up With Specialties Details Why [address] [facility] Opthalmology  Schedule an appointment as soon as possible for a visit For corneal abrasion [place]  [phone]    [facility] EMERGENCY DEPT Emergency Medicine Go to If symptoms worsen [address] [facility] [address] [facility] [address] Gwen Conteh., NP Nurse Practitioner Schedule an appointment as soon as possible for a visit For elevated blood pressure reading Lee Ann Becerra  [address] [facility]  [phone]            Discharge Medication List as of 7/6/2019  9:03 PM      START taking these medications    Details   ciprofloxacin HCl (CILOXAN) 0.3 % ophthalmic solution Apply 1 Drop to eye four (4) times daily for 5 days. , Normal, Disp-2.5 mL, R-0      ketorolac (ACULAR LS) 0.4 % drop Administer 1 Drop to both eyes four (4) times daily for 5 days. , Normal, Disp-5 mL, R-0         CONTINUE these medications which have NOT CHANGED    Details   VICTOZA 2-ROSSANA 0.6 mg/0.1 mL (18 mg/3 mL) pnij INJECT 0.6 MG SUBCUTANEOUS EVERY DAY FOR 7 DAYS. IF TOLERATED INCREASE TO 1.2 MG EVERY DAY FOR DIABETES, Normal, Disp-15 mL, R-0      enalapril-hydroCHLOROthiazide (VASERETIC) 10-25 mg tablet TAKE 2 TABLETS BY MOUTH DAILY, Normal, Disp-180 Tab, R-0      LEVEMIR FLEXTOUCH U-100 INSULN 100 unit/mL (3 mL) inpn ADMINISTER 50 UNITS UNDER THE SKIN EVERY NIGHT FOR DIABETES, Normal, Disp-15 mL, R-3      cetirizine (ZYRTEC) 10 mg tablet Take 1 Tab by mouth daily as needed for Allergies. , Normal, Disp-30 Tab, R-3      albuterol (PROVENTIL HFA, VENTOLIN HFA, PROAIR HFA) 90 mcg/actuation inhaler INHALE 1 TO 2 PUFFS BY MOUTH EVERY 4 HOURS AS NEEDED FOR WHEEZING, Normal, Disp-1 Inhaler, R-2      sildenafil citrate (VIAGRA) 100 mg tablet Take 1 Tab by mouth daily as needed (ED)., Normal, Disp-12 Tab, R-1      rosuvastatin (CRESTOR) 40 mg tablet TAKE 1 TABLET BY MOUTH EVERY NIGHT, Normal, Disp-90 Tab, R-0      metFORMIN (GLUCOPHAGE) 1,000 mg tablet Take 1 Tab by mouth two (2) times daily (with meals). FOR DIABETES, NormalIN PLACE OF JANUMETDisp-180 Tab, R-1      Insulin Needles, Disposable, 30 gauge x 1/3\" by SubCUTAneous route two (2) times a day. levamir & victoza, Normal, Disp-1 Package, R-11      Blood-Glucose Meter monitoring kit Check sugars TID. E11.65. Once touch, Normal, Disp-1 Kit, R-0      Lancets misc Use as directed. Dx: e11.65 check sugars TID, Normal, Disp-1 Each, R-11      glucose blood VI test strips (ASCENSIA AUTODISC VI, ONE TOUCH ULTRA TEST VI) strip E11.65. Check sugar TID, Normal, Disp-100 Strip, R-11      aspirin delayed-release 81 mg tablet Take 1 Tab by mouth daily. , Normal, Disp-30 Tab, R-11             Provider Notes (Medical Decision Making):   Differential diagnosis: Foreign body, corneal abrasion, corneal ulceration, conjunctivitis       Diagnosis     Clinical Impression:   1. Abrasion of right cornea, initial encounter    2. Elevated blood pressure reading    3. Tobacco abuse    4.  Encounter for smoking cessation counseling Please note that this dictation was completed with [employer], the computer voice recognition software. Quite often unanticipated grammatical, syntax, homophones, and other interpretive errors are inadvertently transcribed by the computer software. Please disregard these errors. Please excuse any errors that have escaped final proofreading. This note will not be viewable in [address]

## 2019-07-15 DIAGNOSIS — Z79.4 TYPE 2 DIABETES MELLITUS WITH HYPERGLYCEMIA, WITH LONG-TERM CURRENT USE OF INSULIN (HCC): ICD-10-CM

## 2019-07-15 DIAGNOSIS — E11.65 TYPE 2 DIABETES MELLITUS WITH HYPERGLYCEMIA, WITH LONG-TERM CURRENT USE OF INSULIN (HCC): ICD-10-CM

## 2019-07-15 RX ORDER — METFORMIN HYDROCHLORIDE 1000 MG/1
TABLET ORAL
Qty: 180 TAB | Refills: 0 | Status: SHIPPED | OUTPATIENT
Start: 2019-07-15 | End: 2019-10-11 | Stop reason: SDUPTHER

## 2019-08-09 DIAGNOSIS — N52.1 ERECTILE DYSFUNCTION DUE TO DISEASES CLASSIFIED ELSEWHERE: ICD-10-CM

## 2019-08-09 DIAGNOSIS — E78.5 HYPERLIPIDEMIA, UNSPECIFIED HYPERLIPIDEMIA TYPE: ICD-10-CM

## 2019-08-12 RX ORDER — ROSUVASTATIN CALCIUM 40 MG/1
TABLET, COATED ORAL
Qty: 90 TAB | Refills: 0 | Status: SHIPPED | OUTPATIENT
Start: 2019-08-12 | End: 2019-11-20 | Stop reason: SDUPTHER

## 2019-08-12 RX ORDER — SILDENAFIL 100 MG/1
TABLET, FILM COATED ORAL
Qty: 16 TAB | Refills: 0 | Status: SHIPPED | OUTPATIENT
Start: 2019-08-12 | End: 2019-10-11 | Stop reason: SDUPTHER

## 2019-08-24 DIAGNOSIS — E11.65 TYPE 2 DIABETES MELLITUS WITH HYPERGLYCEMIA, WITH LONG-TERM CURRENT USE OF INSULIN (HCC): ICD-10-CM

## 2019-08-24 DIAGNOSIS — Z79.4 TYPE 2 DIABETES MELLITUS WITH HYPERGLYCEMIA, WITH LONG-TERM CURRENT USE OF INSULIN (HCC): ICD-10-CM

## 2019-08-28 RX ORDER — LIRAGLUTIDE 6 MG/ML
INJECTION SUBCUTANEOUS
Qty: 15 ADJUSTABLE DOSE PRE-FILLED PEN SYRINGE | Refills: 0 | Status: SHIPPED | OUTPATIENT
Start: 2019-08-28 | End: 2019-09-20 | Stop reason: SDUPTHER

## 2019-09-12 RX ORDER — ALBUTEROL SULFATE 90 UG/1
AEROSOL, METERED RESPIRATORY (INHALATION)
Qty: 1 INHALER | Refills: 3 | Status: SHIPPED | OUTPATIENT
Start: 2019-09-12 | End: 2020-03-30

## 2019-09-15 DIAGNOSIS — I10 ESSENTIAL HYPERTENSION: ICD-10-CM

## 2019-09-16 RX ORDER — ENALAPRIL MALEATE AND HYDROCHLOROTHIAZIDE 10; 25 MG/1; MG/1
TABLET ORAL
Qty: 180 TAB | Refills: 0 | Status: SHIPPED | OUTPATIENT
Start: 2019-09-16 | End: 2019-12-23 | Stop reason: SDUPTHER

## 2019-09-20 ENCOUNTER — OFFICE VISIT (OUTPATIENT)
Dept: INTERNAL MEDICINE CLINIC | Age: 49
End: 2019-09-20

## 2019-09-20 VITALS
DIASTOLIC BLOOD PRESSURE: 82 MMHG | HEART RATE: 85 BPM | HEIGHT: 75 IN | SYSTOLIC BLOOD PRESSURE: 140 MMHG | OXYGEN SATURATION: 96 % | BODY MASS INDEX: 39.17 KG/M2 | RESPIRATION RATE: 20 BRPM | TEMPERATURE: 98.3 F | WEIGHT: 315 LBS

## 2019-09-20 DIAGNOSIS — I10 ESSENTIAL HYPERTENSION: ICD-10-CM

## 2019-09-20 DIAGNOSIS — Z23 ENCOUNTER FOR IMMUNIZATION: ICD-10-CM

## 2019-09-20 DIAGNOSIS — Z79.4 TYPE 2 DIABETES MELLITUS WITH HYPERGLYCEMIA, WITH LONG-TERM CURRENT USE OF INSULIN (HCC): Primary | ICD-10-CM

## 2019-09-20 DIAGNOSIS — E11.65 TYPE 2 DIABETES MELLITUS WITH HYPERGLYCEMIA, WITH LONG-TERM CURRENT USE OF INSULIN (HCC): Primary | ICD-10-CM

## 2019-09-20 LAB
ALBUMIN UR QL STRIP: 80 MG/L
CREATININE, URINE POC: 200 MG/DL
HBA1C MFR BLD HPLC: 8.9 %
MICROALBUMIN/CREAT RATIO POC: <30 MG/G

## 2019-09-20 RX ORDER — KETOROLAC TROMETHAMINE 5 MG/ML
SOLUTION OPHTHALMIC
Refills: 0 | COMMUNITY
Start: 2019-07-06 | End: 2019-09-20 | Stop reason: ALTCHOICE

## 2019-09-20 RX ORDER — OFLOXACIN 3 MG/ML
SOLUTION/ DROPS OPHTHALMIC
Refills: 0 | COMMUNITY
Start: 2019-07-06 | End: 2019-09-20 | Stop reason: ALTCHOICE

## 2019-09-20 NOTE — PROGRESS NOTES
Subjective: (As above and below)     Chief Complaint   Patient presents with    Follow-up    Hypertension    Diabetes    Ankle swelling     left     Clint Gustafson is a 52y.o. year old male who presents for     Hypertension ROS:  taking medications as instructed, no medication side effects noted, no TIAs, no chest pain on exertion, no dyspnea on exertion, no swelling of ankles    Diabetic Review of Systems - medication compliance: compliant all of the time, diabetic diet compliance: compliant most of the time, home glucose monitoring: is performed regularly. Usually values are in the 100's, hasn't gotten up to 200 in a while, admits to occasional diet problems- he is not going to the gym as he had been in the past, he tends to eat late at night. He recalls only a few missed doses over the past 3 mo    Wt Readings from Last 3 Encounters:   09/20/19 346 lb 1.6 oz (157 kg)   07/06/19 (!) 352 lb 15.3 oz (160.1 kg)   06/25/19 350 lb (158.8 kg)     Left ankle swelling: swelling to medial aspect, improves w/ elevation. Worse at the end of work shift. Denies pain, has compression stockings      Reviewed PmHx, RxHx, FmHx, SocHx, AllgHx and updated in chart.   Family History   Problem Relation Age of Onset    Diabetes Mother         cancer, liver? hep c    Hypertension Mother     Hypertension Father     Diabetes Brother     Hypertension Brother     Hypertension Maternal Uncle     Diabetes Maternal Grandmother     Diabetes Paternal Grandmother     Hypertension Maternal Uncle     Hypertension Maternal Uncle     Diabetes Paternal Uncle        Past Medical History:   Diagnosis Date    Asthma     Diabetes ([facility] [address]     HTN (hypertension) 3/30/2010    Hypertension     Other and unspecified hyperlipidemia 3/30/2010    Sleep apnea     Type II or unspecified type diabetes mellitus without mention of complication, uncontrolled 3/30/2010      Social History     Socioeconomic History    Marital status: SINGLE     Spouse name: Not on file    Number of children: Not on file    Years of education: Not on file    Highest education level: Not on file   Tobacco Use    Smoking status: Current Some Day Smoker     Packs/day: 0.50     Years: 22.00     Pack years: 11.00     Types: Cigars    Smokeless tobacco: Never Used    Tobacco comment: black and mild   Substance and Sexual Activity    Alcohol use: Yes     Alcohol/week: 10.0 standard drinks     Types: 10 Standard drinks or equivalent per week     Comment: rare    Drug use: No    Sexual activity: Yes     Partners: Female     Birth control/protection: Condom   Social History Narrative    6/6/17: works at [employer]          Current Outpatient Medications   Medication Sig    liraglutide (VICTOZA 3-ROSSANA) 0.6 mg/0.1 mL (18 mg/3 mL) pnij 1.8 mg by SubCUTAneous route daily.  enalapril-hydroCHLOROthiazide (VASERETIC) 10-25 mg tablet TAKE 2 TABLETS BY MOUTH DAILY    albuterol (PROVENTIL HFA, VENTOLIN HFA, PROAIR HFA) 90 mcg/actuation inhaler INHALE 1 TO 2 PUFFS BY MOUTH EVERY 4 HOURS AS NEEDED FOR WHEEZING    rosuvastatin (CRESTOR) 40 mg tablet TAKE 1 TABLET BY MOUTH EVERY NIGHT    sildenafil citrate (VIAGRA) 100 mg tablet TAKE 1 TABLET BY MOUTH ONCE DAILY AS NEEDED    metFORMIN (GLUCOPHAGE) 1,000 mg tablet TAKE 1 TABLET BY MOUTH TWICE DAILY WITH MEALS FOR DIABETES. REPLACES JANUMET    LEVEMIR FLEXTOUCH U-100 INSULN 100 unit/mL (3 mL) inpn ADMINISTER 50 UNITS UNDER THE SKIN EVERY NIGHT FOR DIABETES    cetirizine (ZYRTEC) 10 mg tablet Take 1 Tab by mouth daily as needed for Allergies.  Insulin Needles, Disposable, 30 gauge x 1/3\" by SubCUTAneous route two (2) times a day. levamir & victoza    Blood-Glucose Meter monitoring kit Check sugars TID. E11.65. Once touch    Lancets misc Use as directed. Dx: e11.65 check sugars TID    glucose blood VI test strips (ASCENSIA AUTODISC VI, ONE TOUCH ULTRA TEST VI) strip E11.65.  Check sugar TID    aspirin delayed-release 81 mg tablet Take 1 Tab by mouth daily. No current facility-administered medications for this visit. Review of Systems:   Constitutional:    Negative for fever and chills, negative diaphoresis. HEENT:              Negative for neck pain and stiffness. Eyes:                  Negative for visual disturbance, itching, redness or discharge. Respiratory:        Negative for cough and shortness of breath. Cardiovascular:  Negative for chest pain and palpitations. Gastrointestinal: Negative for nausea, vomiting, abdominal pain, diarrhea or constipation. Genitourinary:     Negative for dysuria and frequency. Musculoskeletal: Negative for falls, tenderness and swelling. Skin:                    Negative for rash, masses or lesions. Neurological:       Negative for dizzyness, seizure, loss of consciousness, weakness and numbness. Objective:     Vitals:    09/20/19 0956   BP: 140/82   Pulse: 85   Resp: 20   Temp: 98.3 °F (36.8 °C)   TempSrc: Oral   SpO2: 96%   Weight: 346 lb 1.6 oz (157 kg)   Height: 6' 3\" (1.905 m)     Results for orders placed or performed in visit on 09/20/19   AMB POC HEMOGLOBIN A1C   Result Value Ref Range    Hemoglobin A1c (POC) 8.9 %   AMB POC URINE, MICROALBUMIN, SEMIQUANT (3 RESULTS)   Result Value Ref Range    ALBUMIN, URINE POC 80 Negative mg/L    CREATININE, URINE  mg/dL    Microalbumin/creat ratio (POC) <30 <30 MG/G         Physical Examination: General appearance - alert, well appearing, and in no distress and overweight  Chest - clear to auscultation, no wheezes, rales or rhonchi, symmetric air entry  Heart - normal rate, regular rhythm, normal S1, S2, no murmurs, rubs, clicks or gallops  Extremities - no pedal edema noted      Assessment/ Plan:     Follow-up and Dispositions    · Return in about 3 months (around 12/20/2019) for dm.          1. Type 2 diabetes mellitus with hyperglycemia, with long-term current use of insulin (HCC)  Max out victoza, diet & exercise   - AMB POC HEMOGLOBIN A1C  - AMB POC URINE, MICROALBUMIN, SEMIQUANT (3 RESULTS)  - liraglutide (VICTOZA 3-ROSSANA) 0.6 mg/0.1 mL (18 mg/3 mL) pnij; 1.8 mg by SubCUTAneous route daily. Dispense: 15 Adjustable Dose Pre-filled Pen Syringe; Refill: 0    2. Encounter for immunization    - INFLUENZA VIRUS VAC QUAD,SPLIT,PRESV FREE SYRINGE IM    3. Essential hypertension  Moderately controlled        I have discussed the diagnosis with the patient and the intended plan as seen in the above orders. The patient has received an after-visit summary and questions were answered concerning future plans. Pt conveyed understanding of plan. Medication Side Effects and Warnings were discussed with patient: yes  Patient Labs were reviewed: yes  Patient Past Records were reviewed:  yes    Dave Thomas.  Antonietta Kim NP

## 2019-09-20 NOTE — PATIENT INSTRUCTIONS
Leg and Ankle Edema: Care Instructions  Your Care Instructions  Swelling in the legs, ankles, and feet is called edema. It is common after you sit or stand for a while. Long plane flights or car rides often cause swelling in the legs and feet. You may also have swelling if you have to stand for long periods of time at your job. Problems with the veins in the legs (varicose veins) and changes in hormones can also cause swelling. Sometimes the swelling in the ankles and feet is caused by a more serious problem, such as heart failure, infection, blood clots, or liver or kidney disease. Follow-up care is a key part of your treatment and safety. Be sure to make and go to all appointments, and call your doctor if you are having problems. It's also a good idea to know your test results and keep a list of the medicines you take. How can you care for yourself at home? · If your doctor gave you medicine, take it as prescribed. Call your doctor if you think you are having a problem with your medicine. · Whenever you are resting, raise your legs up. Try to keep the swollen area higher than the level of your heart. · Take breaks from standing or sitting in one position. ? Walk around to increase the blood flow in your lower legs. ? Move your feet and ankles often while you stand, or tighten and relax your leg muscles. · Wear support stockings. Put them on in the morning, before swelling gets worse. · Eat a balanced diet. Lose weight if you need to. · Limit the amount of salt (sodium) in your diet. Salt holds fluid in the body and may increase swelling. When should you call for help? Call 911 anytime you think you may need emergency care. For example, call if:    · You have symptoms of a blood clot in your lung (called a pulmonary embolism). These may include:  ? Sudden chest pain. ? Trouble breathing. ?  Coughing up blood.    Call your doctor now or seek immediate medical care if:    · You have signs of a blood clot, such as:  ? Pain in your calf, back of the knee, thigh, or groin. ? Redness and swelling in your leg or groin.     · You have symptoms of infection, such as:  ? Increased pain, swelling, warmth, or redness. ? Red streaks or pus. ? A fever.    Watch closely for changes in your health, and be sure to contact your doctor if:    · Your swelling is getting worse.     · You have new or worsening pain in your legs.     · You do not get better as expected. Where can you learn more? Go to http://kimo-cherry.info/. Enter T538 in the search box to learn more about \"Leg and Ankle Edema: Care Instructions. \"  Current as of: September 23, 2018  Content Version: 12.1  © 1389-7598 [employer]. Care instructions adapted under license by [employer] (which disclaims liability or warranty for this information). If you have questions about a medical condition or this instruction, always ask your healthcare professional. [place] [address] any warranty or liability for your use of this information.

## 2019-09-20 NOTE — PROGRESS NOTES
Pt here for   Chief Complaint   Patient presents with    Follow-up    Hypertension    Diabetes    Ankle swelling     left     1. Have you been to the ER, urgent care clinic since your last visit? Hospitalized since your last visit? No    2. Have you seen or consulted any other health care providers outside of the [address] [place] [address] since your last visit? Include any pap smears or colon screening.  No     Pt denies pain at this time    3 most recent PHQ Screens 9/20/2019   PHQ Not Done -   Little interest or pleasure in doing things Not at all   Feeling down, depressed, irritable, or hopeless Not at all   Total Score PHQ 2 0

## 2019-10-11 DIAGNOSIS — Z79.4 TYPE 2 DIABETES MELLITUS WITH HYPERGLYCEMIA, WITH LONG-TERM CURRENT USE OF INSULIN (HCC): ICD-10-CM

## 2019-10-11 DIAGNOSIS — E11.65 TYPE 2 DIABETES MELLITUS WITH HYPERGLYCEMIA, WITH LONG-TERM CURRENT USE OF INSULIN (HCC): ICD-10-CM

## 2019-10-11 DIAGNOSIS — N52.1 ERECTILE DYSFUNCTION DUE TO DISEASES CLASSIFIED ELSEWHERE: ICD-10-CM

## 2019-10-11 RX ORDER — METFORMIN HYDROCHLORIDE 1000 MG/1
TABLET ORAL
Qty: 180 TAB | Refills: 0 | Status: SHIPPED | OUTPATIENT
Start: 2019-10-11 | End: 2019-12-23 | Stop reason: SDUPTHER

## 2019-10-11 RX ORDER — INSULIN DETEMIR 100 [IU]/ML
INJECTION, SOLUTION SUBCUTANEOUS
Qty: 5 ADJUSTABLE DOSE PRE-FILLED PEN SYRINGE | Refills: 2 | Status: SHIPPED | OUTPATIENT
Start: 2019-10-11 | End: 2019-12-23 | Stop reason: SDUPTHER

## 2019-10-11 RX ORDER — SILDENAFIL 100 MG/1
TABLET, FILM COATED ORAL
Qty: 8 TAB | Refills: 1 | Status: SHIPPED | OUTPATIENT
Start: 2019-10-11 | End: 2020-01-20

## 2019-11-20 DIAGNOSIS — E78.5 HYPERLIPIDEMIA, UNSPECIFIED HYPERLIPIDEMIA TYPE: ICD-10-CM

## 2019-11-20 RX ORDER — ROSUVASTATIN CALCIUM 40 MG/1
TABLET, COATED ORAL
Qty: 90 TAB | Refills: 0 | Status: SHIPPED | OUTPATIENT
Start: 2019-11-20 | End: 2019-12-23 | Stop reason: SDUPTHER

## 2019-12-23 ENCOUNTER — OFFICE VISIT (OUTPATIENT)
Dept: INTERNAL MEDICINE CLINIC | Age: 49
End: 2019-12-23

## 2019-12-23 VITALS
OXYGEN SATURATION: 93 % | BODY MASS INDEX: 39.17 KG/M2 | TEMPERATURE: 98.3 F | HEART RATE: 100 BPM | RESPIRATION RATE: 19 BRPM | SYSTOLIC BLOOD PRESSURE: 129 MMHG | HEIGHT: 75 IN | DIASTOLIC BLOOD PRESSURE: 79 MMHG | WEIGHT: 315 LBS

## 2019-12-23 DIAGNOSIS — I10 ESSENTIAL HYPERTENSION: ICD-10-CM

## 2019-12-23 DIAGNOSIS — E11.65 TYPE 2 DIABETES MELLITUS WITH HYPERGLYCEMIA, WITH LONG-TERM CURRENT USE OF INSULIN (HCC): Primary | ICD-10-CM

## 2019-12-23 DIAGNOSIS — E78.5 HYPERLIPIDEMIA, UNSPECIFIED HYPERLIPIDEMIA TYPE: ICD-10-CM

## 2019-12-23 DIAGNOSIS — Z79.4 TYPE 2 DIABETES MELLITUS WITH HYPERGLYCEMIA, WITH LONG-TERM CURRENT USE OF INSULIN (HCC): Primary | ICD-10-CM

## 2019-12-23 LAB
CHOLEST SERPL-MCNC: 122 MG/DL
HBA1C MFR BLD HPLC: 10.1 %
HDLC SERPL-MCNC: 45 MG/DL
LDL CHOLESTEROL POC: 27 MG/DL
NON-HDL GOAL (POC): 77
TCHOL/HDL RATIO (POC): 0.6
TRIGL SERPL-MCNC: 252 MG/DL

## 2019-12-23 RX ORDER — ROSUVASTATIN CALCIUM 40 MG/1
TABLET, COATED ORAL
Qty: 90 TAB | Refills: 0 | Status: SHIPPED | OUTPATIENT
Start: 2019-12-23 | End: 2020-03-30

## 2019-12-23 RX ORDER — METFORMIN HYDROCHLORIDE 1000 MG/1
TABLET ORAL
Qty: 180 TAB | Refills: 1 | Status: SHIPPED | OUTPATIENT
Start: 2019-12-23 | End: 2020-07-01

## 2019-12-23 RX ORDER — ENALAPRIL MALEATE AND HYDROCHLOROTHIAZIDE 10; 25 MG/1; MG/1
TABLET ORAL
Qty: 180 TAB | Refills: 1 | Status: SHIPPED | OUTPATIENT
Start: 2019-12-23 | End: 2020-05-27

## 2019-12-23 NOTE — PROGRESS NOTES
Subjective: (As above and below)     Chief Complaint   Patient presents with    Diabetes     Benigno Viera is a 52y.o. year old male who presents for     Hypertension ROS:  taking medications as instructed, no medication side effects noted, no TIAs, no chest pain on exertion, no dyspnea on exertion, no swelling of ankles    He started working out again [employer], feels good when doing so    Diabetic Review of Systems - medication compliance: compliant all of the time, diabetic diet compliance: noncompliant much of the time, home glucose monitoring: is performed regularly. A1c has gone up - he admits to cheating w/ diet again - lots of fried foods and sweet drinks. .... He is concerned bc he rc'd a letter that [employer] will not be covered any more    He also asks for a new type of glucometer that monitors continuously but attaches to his abdomen. He had the freestyle marissa on his arm but kept knocking it off        Reviewed PmHx, RxHx, FmHx, SocHx, AllgHx and updated in chart.   Family History   Problem Relation Age of Onset    Diabetes Mother         cancer, liver? hep c    Hypertension Mother     Hypertension Father     Diabetes Brother     Hypertension Brother     Hypertension Maternal Uncle     Diabetes Maternal Grandmother     Diabetes Paternal Grandmother     Hypertension Maternal Uncle     Hypertension Maternal Uncle     Diabetes Paternal Uncle        Past Medical History:   Diagnosis Date    Asthma     Diabetes ([facility] [address]     HTN (hypertension) 3/30/2010    Hypertension     Other and unspecified hyperlipidemia 3/30/2010    Sleep apnea     Type II or unspecified type diabetes mellitus without mention of complication, uncontrolled 3/30/2010      Social History     Socioeconomic History    Marital status: SINGLE     Spouse name: Not on file    Number of children: Not on file    Years of education: Not on file    Highest education level: Not on file   Tobacco Use    Smoking status: Current Some Day Smoker     Packs/day: 0.50     Years: 22.00     Pack years: 11.00     Types: Cigars    Smokeless tobacco: Never Used    Tobacco comment: black and mild   Substance and Sexual Activity    Alcohol use: Yes     Alcohol/week: 10.0 standard drinks     Types: 10 Standard drinks or equivalent per week     Comment: rare    Drug use: No    Sexual activity: Yes     Partners: Female     Birth control/protection: Condom   Social History Narrative    6/6/17: works at [employer]          Current Outpatient Medications   Medication Sig    liraglutide (VICTOZA 3-ROSSANA) 0.6 mg/0.1 mL (18 mg/3 mL) pnij 1.8 mg by SubCUTAneous route daily.  enalapril-hydroCHLOROthiazide (VASERETIC) 10-25 mg tablet TAKE 2 TABLETS BY MOUTH DAILY    insulin detemir U-100 (LEVEMIR FLEXTOUCH U-100 INSULN) 100 unit/mL (3 mL) inpn INJECT 50 UNITS SUBCUTANEOUSLY EVERY NIGHT FOR DIABETES    rosuvastatin (CRESTOR) 40 mg tablet TAKE 1 TABLET BY MOUTH EVERY NIGHT    metFORMIN (GLUCOPHAGE) 1,000 mg tablet TAKE 1 TABLET BY MOUTH TWICE DAILY WITH MEALS FOR DIABETES. REPLACES JANUMET    sildenafil citrate (VIAGRA) 100 mg tablet TAKE 1 TABLET BY MOUTH ONCE DAILY AS NEEDED    albuterol (PROVENTIL HFA, VENTOLIN HFA, PROAIR HFA) 90 mcg/actuation inhaler INHALE 1 TO 2 PUFFS BY MOUTH EVERY 4 HOURS AS NEEDED FOR WHEEZING    cetirizine (ZYRTEC) 10 mg tablet Take 1 Tab by mouth daily as needed for Allergies.  Insulin Needles, Disposable, 30 gauge x 1/3\" by SubCUTAneous route two (2) times a day. levamir & victoza    Blood-Glucose Meter monitoring kit Check sugars TID. E11.65. Once touch    Lancets misc Use as directed. Dx: e11.65 check sugars TID    glucose blood VI test strips (ASCENSIA AUTODISC VI, ONE TOUCH ULTRA TEST VI) strip E11.65. Check sugar TID    aspirin delayed-release 81 mg tablet Take 1 Tab by mouth daily. No current facility-administered medications for this visit.         Review of Systems:   Constitutional:    Negative for fever and chills, negative diaphoresis. HEENT:              Negative for neck pain and stiffness. Eyes:                  Negative for visual disturbance, itching, redness or discharge. Respiratory:        Negative for cough and shortness of breath. Cardiovascular:  Negative for chest pain and palpitations. Gastrointestinal: Negative for nausea, vomiting, abdominal pain, diarrhea or constipation. Genitourinary:     Negative for dysuria and frequency. Musculoskeletal: Negative for falls, tenderness and swelling. Skin:                    Negative for rash, masses or lesions. Neurological:       Negative for dizzyness, seizure, loss of consciousness, weakness and numbness. Objective:     Vitals:    12/23/19 0926   BP: 129/79   Pulse: 100   Resp: 19   Temp: 98.3 °F (36.8 °C)   TempSrc: Oral   SpO2: 93%   Weight: (!) 353 lb (160.1 kg)   Height: 6' 3\" (1.905 m)       Results for orders placed or performed in visit on 12/23/19   AMB POC HEMOGLOBIN A1C   Result Value Ref Range    Hemoglobin A1c (POC) 10.1 %   AMB POC LIPID PROFILE   Result Value Ref Range    Cholesterol (POC) 122     Triglycerides (POC) 252     HDL Cholesterol (POC) 45     LDL Cholesterol (POC) 27 MG/DL    Non-HDL Goal (POC) 77     TChol/HDL Ratio (POC) 0.6          Physical Examination: General appearance - alert, well appearing, and in no distress and overweight  Chest - clear to auscultation, no wheezes, rales or rhonchi, symmetric air entry  Heart - normal rate, regular rhythm, normal S1, S2, no murmurs, rubs, clicks or gallops  Extremities - no pedal edema noted      Assessment/ Plan:     Follow-up and Dispositions    · Return in about 3 months (around 3/23/2020). He will bring in the paperwork for the glucometer he is interested in getting. ..      1. Type 2 diabetes mellitus with hyperglycemia, with long-term current use of insulin (HCC)  Clear diet indiscretions.  He asks for 3 months to improve diet  Will look into need for prior auth for victoza  Would like him to attend DM education again in the new year    - AMB POC HEMOGLOBIN A1C  - AMB POC LIPID PROFILE  - liraglutide (VICTOZA 3-ROSSANA) 0.6 mg/0.1 mL (18 mg/3 mL) pnij; 1.8 mg by SubCUTAneous route daily. Dispense: 15 Adjustable Dose Pre-filled Pen Syringe; Refill: 0  - insulin detemir U-100 (LEVEMIR FLEXTOUCH U-100 INSULN) 100 unit/mL (3 mL) inpn; INJECT 50 UNITS SUBCUTANEOUSLY EVERY NIGHT FOR DIABETES  Dispense: 5 Adjustable Dose Pre-filled Pen Syringe; Refill: 2  - metFORMIN (GLUCOPHAGE) 1,000 mg tablet; TAKE 1 TABLET BY MOUTH TWICE DAILY WITH MEALS FOR DIABETES. REPLACES JANUMET  Dispense: 180 Tab; Refill: 1    2. Essential hypertension    - enalapril-hydroCHLOROthiazide (VASERETIC) 10-25 mg tablet; TAKE 2 TABLETS BY MOUTH DAILY  Dispense: 180 Tab; Refill: 1    3. Hyperlipidemia, unspecified hyperlipidemia type    - rosuvastatin (CRESTOR) 40 mg tablet; TAKE 1 TABLET BY MOUTH EVERY NIGHT  Dispense: 90 Tab; Refill: 0        I have discussed the diagnosis with the patient and the intended plan as seen in the above orders. The patient has received an after-visit summary and questions were answered concerning future plans. Pt conveyed understanding of plan. Medication Side Effects and Warnings were discussed with patient: yes  Patient Labs were reviewed: yes  Patient Past Records were reviewed:  yes    Emily Vallejo.  Pam Hoang NP

## 2019-12-23 NOTE — PROGRESS NOTES
Chief Complaint   Patient presents with    Diabetes     1. Have you been to the ER, urgent care clinic since your last visit? Hospitalized since your last visit? No    2. Have you seen or consulted any other health care providers outside of the [address] [place] [address] since your last visit? Include any pap smears or colon screening.  No

## 2019-12-24 DIAGNOSIS — Z79.4 TYPE 2 DIABETES MELLITUS WITH HYPERGLYCEMIA, WITH LONG-TERM CURRENT USE OF INSULIN (HCC): ICD-10-CM

## 2019-12-24 DIAGNOSIS — E11.65 TYPE 2 DIABETES MELLITUS WITH HYPERGLYCEMIA, WITH LONG-TERM CURRENT USE OF INSULIN (HCC): ICD-10-CM

## 2019-12-24 DIAGNOSIS — E11.65 UNCONTROLLED TYPE 2 DIABETES MELLITUS WITH HYPERGLYCEMIA, WITH LONG-TERM CURRENT USE OF INSULIN (HCC): ICD-10-CM

## 2019-12-24 DIAGNOSIS — Z79.4 UNCONTROLLED TYPE 2 DIABETES MELLITUS WITH HYPERGLYCEMIA, WITH LONG-TERM CURRENT USE OF INSULIN (HCC): ICD-10-CM

## 2020-01-19 DIAGNOSIS — N52.1 ERECTILE DYSFUNCTION DUE TO DISEASES CLASSIFIED ELSEWHERE: ICD-10-CM

## 2020-01-20 RX ORDER — SILDENAFIL 100 MG/1
TABLET, FILM COATED ORAL
Qty: 12 TAB | Refills: 1 | Status: SHIPPED | OUTPATIENT
Start: 2020-01-20 | End: 2020-04-16

## 2020-01-20 RX ORDER — FLASH GLUCOSE SCANNING READER
EACH MISCELLANEOUS
Qty: 2 EACH | Refills: 1 | Status: SHIPPED | OUTPATIENT
Start: 2020-01-20 | End: 2022-02-17

## 2020-03-30 DIAGNOSIS — E78.5 HYPERLIPIDEMIA, UNSPECIFIED HYPERLIPIDEMIA TYPE: ICD-10-CM

## 2020-03-30 DIAGNOSIS — E11.65 TYPE 2 DIABETES MELLITUS WITH HYPERGLYCEMIA, WITH LONG-TERM CURRENT USE OF INSULIN (HCC): ICD-10-CM

## 2020-03-30 DIAGNOSIS — Z79.4 TYPE 2 DIABETES MELLITUS WITH HYPERGLYCEMIA, WITH LONG-TERM CURRENT USE OF INSULIN (HCC): ICD-10-CM

## 2020-03-30 RX ORDER — LIRAGLUTIDE 6 MG/ML
1.8 INJECTION SUBCUTANEOUS DAILY
Qty: 15 ADJUSTABLE DOSE PRE-FILLED PEN SYRINGE | Refills: 0 | Status: SHIPPED | OUTPATIENT
Start: 2020-03-30 | End: 2020-05-26

## 2020-03-30 RX ORDER — ROSUVASTATIN CALCIUM 40 MG/1
TABLET, COATED ORAL
Qty: 60 TAB | Refills: 0 | Status: SHIPPED | OUTPATIENT
Start: 2020-03-30 | End: 2020-05-26

## 2020-03-30 RX ORDER — ALBUTEROL SULFATE 90 UG/1
AEROSOL, METERED RESPIRATORY (INHALATION)
Qty: 9 G | Refills: 0 | Status: SHIPPED | OUTPATIENT
Start: 2020-03-30 | End: 2020-05-26

## 2020-04-02 DIAGNOSIS — Z79.4 TYPE 2 DIABETES MELLITUS WITH HYPERGLYCEMIA, WITH LONG-TERM CURRENT USE OF INSULIN (HCC): ICD-10-CM

## 2020-04-02 DIAGNOSIS — E11.65 TYPE 2 DIABETES MELLITUS WITH HYPERGLYCEMIA, WITH LONG-TERM CURRENT USE OF INSULIN (HCC): ICD-10-CM

## 2020-04-02 RX ORDER — INSULIN DETEMIR 100 [IU]/ML
INJECTION, SOLUTION SUBCUTANEOUS
Qty: 15 ML | Refills: 2 | Status: SHIPPED | OUTPATIENT
Start: 2020-04-02 | End: 2020-08-14

## 2020-04-02 NOTE — TELEPHONE ENCOUNTER
Delvin Mei NP / te   Received: Today   Message Contents   Juan Mantilla sent to [address] [facility]             General Message/Vendor Calls     Caller's first and last name: David Dillard       Reason for call: Pt states that he missed a call and was told to call the office and would like a call back this might be in reference to his appt.        Callback required yes/no and why: Pt also states that he would need Levemir flex pen  called into the [address] [phone]       Best contact number(s): [phone]       Details to clarify the request       Gema Castañead

## 2020-04-06 ENCOUNTER — VIRTUAL VISIT (OUTPATIENT)
Dept: INTERNAL MEDICINE CLINIC | Age: 50
End: 2020-04-06

## 2020-04-06 DIAGNOSIS — E11.65 TYPE 2 DIABETES MELLITUS WITH HYPERGLYCEMIA, WITH LONG-TERM CURRENT USE OF INSULIN (HCC): Primary | ICD-10-CM

## 2020-04-06 DIAGNOSIS — Z79.4 TYPE 2 DIABETES MELLITUS WITH HYPERGLYCEMIA, WITH LONG-TERM CURRENT USE OF INSULIN (HCC): Primary | ICD-10-CM

## 2020-04-06 NOTE — PROGRESS NOTES
Consent: Yara Harden, who was seen by synchronous (real-time) audio-video technology, and/or his healthcare decision maker, is aware that this patient-initiated, Telehealth encounter on 4/6/2020 is a billable service, with coverage as determined by his insurance carrier. He is aware that he may receive a bill and has provided verbal consent to proceed: Yes. IN AN EFFORT TO LIMIT PUBLIC INTERACTION DUE TO THE COVID 19 PANDEMIC, THIS PATIENT IS DEEMED APPROPRIATE FOR A VIRTUAL VISIT. Assessment & Plan:   Diagnoses and all orders for this visit:    1. Type 2 diabetes mellitus with hyperglycemia, with long-term current use of insulin (HCC)        Diabetic Review of Systems - medication compliance: compliant all of the time, diabetic diet compliance: noncompliant some of the time, home glucose monitoring: is performed regularly. He has continuous glucose meter - sugars ranging from 130's to 190's. He admits to weight gain during pandemic/isolation - not as physically active as before    He works at [employer] and procedures in place for him to return tmrw    Follow-up and Dispositions         1. Type 2 diabetes mellitus with hyperglycemia, with long-term current use of insulin (HCC)  Cont current meds/glucose monitoring, seems overall sugars are improving, will defer a1c for now but call office if sugars regularly high or low      I spent at least 10 minutes with this established patient, and >50% of the time was spent counseling and/or coordinating care regarding plan regarding checking sugars, need to increase physical activity, when to f/u  712  Subjective:   Yara Harden is a 52 y.o. male who was seen for No chief complaint on file. Prior to Admission medications    Medication Sig Start Date End Date Taking?  Authorizing Provider   insulin detemir U-100 (Levemir FlexTouch U-100 Insuln) 100 unit/mL (3 mL) inpn INJECT 50 UNITS SUBCUTANEOUSLY EVERY NIGHT FOR DIABETES 4/2/20   Lizzette Hanks., KATIA rosuvastatin (CRESTOR) 40 mg tablet Take 1 tablet by mouth nightly 3/30/20   Marylen Presume Z., KATIA   ProAir HFA 90 mcg/actuation inhaler INHALE 1 TO 2 PUFFS BY MOUTH EVERY 4 HOURS AS NEEDED FOR WHEEZING 3/30/20   Marylen Presume Z., KATIA   liraglutide (Victoza 3-Demar) 0.6 mg/0.1 mL (18 mg/3 mL) pnij 1.8 mg by SubCUTAneous route daily. 3/30/20   Chapis Ceballos NP   sildenafil citrate (VIAGRA) 100 mg tablet TAKE 1 TABLET BY MOUTH EVERY DAY AS NEEDED 1/20/20   Chapis Ceballos NP   FREESTYLE FERMIN 14 DAY READER misc USE TO CHECK BLOOD SUGAR ONCE EVERY 2 WEEKS 1/20/20   Chapis Ceballos NP   [facility] ULTRA BLUE TEST STRIP strip CHECK BLOOD SUGAR THREE TIMES DAILY 12/24/19   Marylen Presume Z., KATIA   Insulin Needles, Disposable, 30 gauge x 1/3\" USE TO INJECT LEVEMIR AND VICTOZA TWICE DAILY 12/24/19   Chapis Ceballos NP   enalapril-hydroCHLOROthiazide (VASERETIC) 10-25 mg tablet TAKE 2 TABLETS BY MOUTH DAILY 12/23/19   Marylen Presume Z., KATIA   metFORMIN (GLUCOPHAGE) 1,000 mg tablet TAKE 1 TABLET BY MOUTH TWICE DAILY WITH MEALS FOR DIABETES. REPLACES JANUMET 12/23/19   Chapis Ceballos NP   cetirizine (ZYRTEC) 10 mg tablet Take 1 Tab by mouth daily as needed for Allergies. 5/29/19   Chapis Ceballos NP   Blood-Glucose Meter monitoring kit Check sugars TID. E11.65. Once touch 4/19/18   Chapis Ceballos NP   Lancets misc Use as directed. Dx: e11.65 check sugars TID 4/19/18   Chapis Ceballos NP   aspirin delayed-release 81 mg tablet Take 1 Tab by mouth daily.  8/12/14   Edilia Ramsey MD     Allergies   Allergen Reactions    Diazepam Other (comments)     Caused hallucinations, paranoia    Shellfish Derived Angioedema       Patient Active Problem List   Diagnosis Code    Type II diabetes mellitus, uncontrolled ([facility] E11.65    Hypertension I10    Hyperlipidemia E78.5    Asthma J45.909    Sleep apnea G47.30    Obesity, morbid ([facility] E66.01    Patient non adherence Z91.19    Smoker F17.200    Low vitamin D level R79.89     Patient Active Problem List    Diagnosis Date Noted    Low vitamin D level 05/31/2019    Smoker 12/17/2018    Obesity, morbid ([facility] 01/19/2018    Patient non adherence 01/19/2018    Sleep apnea 09/11/2017    Type II diabetes mellitus, uncontrolled ([facility] 03/30/2010    Hypertension 03/30/2010    Hyperlipidemia 03/30/2010    Asthma 03/30/2010     Current Outpatient Medications   Medication Sig Dispense Refill    insulin detemir U-100 (Levemir FlexTouch U-100 Insuln) 100 unit/mL (3 mL) inpn INJECT 50 UNITS SUBCUTANEOUSLY EVERY NIGHT FOR DIABETES 15 mL 2    rosuvastatin (CRESTOR) 40 mg tablet Take 1 tablet by mouth nightly 60 Tab 0    ProAir HFA 90 mcg/actuation inhaler INHALE 1 TO 2 PUFFS BY MOUTH EVERY 4 HOURS AS NEEDED FOR WHEEZING 9 g 0    liraglutide (Victoza 3-Demar) 0.6 mg/0.1 mL (18 mg/3 mL) pnij 1.8 mg by SubCUTAneous route daily. 15 Adjustable Dose Pre-filled Pen Syringe 0    sildenafil citrate (VIAGRA) 100 mg tablet TAKE 1 TABLET BY MOUTH EVERY DAY AS NEEDED 12 Tab 1    FREESTYLE FERMIN 14 DAY READER misc USE TO CHECK BLOOD SUGAR ONCE EVERY 2 WEEKS 2 Each 1    ONETOUCH ULTRA BLUE TEST STRIP strip CHECK BLOOD SUGAR THREE TIMES DAILY 100 Strip 11    Insulin Needles, Disposable, 30 gauge x 1/3\" USE TO INJECT LEVEMIR AND VICTOZA TWICE DAILY 1 Pen Needle 0    enalapril-hydroCHLOROthiazide (VASERETIC) 10-25 mg tablet TAKE 2 TABLETS BY MOUTH DAILY 180 Tab 1    metFORMIN (GLUCOPHAGE) 1,000 mg tablet TAKE 1 TABLET BY MOUTH TWICE DAILY WITH MEALS FOR DIABETES. REPLACES JANUMET 180 Tab 1    cetirizine (ZYRTEC) 10 mg tablet Take 1 Tab by mouth daily as needed for Allergies. 30 Tab 3    Blood-Glucose Meter monitoring kit Check sugars TID. E11.65. Once touch 1 Kit 0    Lancets misc Use as directed. Dx: e11.65 check sugars TID 1 Each 11    aspirin delayed-release 81 mg tablet Take 1 Tab by mouth daily.  30 Tab 11     Allergies Allergen Reactions    Diazepam Other (comments)     Caused hallucinations, paranoia    Shellfish Derived Angioedema     Past Medical History:   Diagnosis Date    Asthma     Diabetes ([facility]     HTN (hypertension) 3/30/2010    Hypertension     Other and unspecified hyperlipidemia 3/30/2010    Sleep apnea     Type II or unspecified type diabetes mellitus without mention of complication, uncontrolled 3/30/2010     Past Surgical History:   Procedure Laterality Date    ABDOMEN SURGERY PROC UNLISTED      hernia repair     Family History   Problem Relation Age of Onset    Diabetes Mother         cancer, liver? hep c    Hypertension Mother     Hypertension Father     Diabetes Brother     Hypertension Brother     Hypertension Maternal Uncle     Diabetes Maternal Grandmother     Diabetes Paternal Grandmother     Hypertension Maternal Uncle     Hypertension Maternal Uncle     Diabetes Paternal Uncle      Social History     Tobacco Use    Smoking status: Current Some Day Smoker     Packs/day: 0.50     Years: 22.00     Pack years: 11.00     Types: Cigars    Smokeless tobacco: Never Used    Tobacco comment: black and mild   Substance Use Topics    Alcohol use: Yes     Alcohol/week: 10.0 standard drinks     Types: 10 Standard drinks or equivalent per week     Comment: rare       ROS    Reports feeling in USOH      Objective:   Vital Signs: (As obtained by patient/caregiver at home)  There were no vitals taken for this visit.      [INSTRUCTIONS:  \"[x]\" Indicates a positive item  \"[]\" Indicates a negative item  -- DELETE ALL ITEMS NOT EXAMINED]    Constitutional: [x] Appears well-developed and well-nourished [x] No apparent distress      [] Abnormal -     Mental status: [x] Alert and awake  [x] Oriented to person/place/time [x] Able to follow commands    [] Abnormal -     Eyes:   EOM    [x]  Normal    [] Abnormal -   Sclera  [x]  Normal    [] Abnormal -          Discharge [x]  None visible   [] Abnormal - HENT: [x] Normocephalic, atraumatic  [] Abnormal -   [x] Mouth/Throat: Mucous membranes are moist    External Ears [x] Normal  [] Abnormal -    Neck: [x] No visualized mass [] Abnormal -     Pulmonary/Chest: [x] Respiratory effort normal   [x] No visualized signs of difficulty breathing or respiratory distress        [] Abnormal -      Musculoskeletal:   [x] Normal gait with no signs of ataxia         [x] Normal range of motion of neck        [] Abnormal -     Neurological:        [x] No Facial Asymmetry (Cranial nerve 7 motor function) (limited exam due to video visit)          [x] No gaze palsy        [] Abnormal -          Skin:        [x] No significant exanthematous lesions or discoloration noted on facial skin         [] Abnormal -            Psychiatric:       [x] Normal Affect [] Abnormal -        [x] No Hallucinations    Other pertinent observable physical exam findings:-        We discussed the expected course, resolution and complications of the diagnosis(es) in detail. Medication risks, benefits, costs, interactions, and alternatives were discussed as indicated. I advised him to contact the office if his condition worsens, changes or fails to improve as anticipated. He expressed understanding with the diagnosis(es) and plan. Jose Baxter is a 52 y.o. male being evaluated by a video visit encounter for concerns as above. A caregiver was present when appropriate. Due to this being a TeleHealth encounter ([facility]), evaluation of the following organ systems was limited: Vitals/Constitutional/EENT/Resp/CV/GI//MS/Neuro/Skin/Heme-Lymph-Imm.   Pursuant to the emergency declaration under the [address] [place] [address] and the [employer] Act, this Virtual  Visit was conducted, with patient's (and/or legal guardian's) consent, to reduce the patient's risk of exposure to COVID-19 and provide necessary medical care. Services were provided through a video synchronous discussion virtually to substitute for in-person clinic visit. Patient and provider were located at their individual homes. Lesa Richardson NP

## 2020-04-08 DIAGNOSIS — Z79.4 TYPE 2 DIABETES MELLITUS WITH HYPERGLYCEMIA, WITH LONG-TERM CURRENT USE OF INSULIN (HCC): ICD-10-CM

## 2020-04-08 DIAGNOSIS — E11.65 TYPE 2 DIABETES MELLITUS WITH HYPERGLYCEMIA, WITH LONG-TERM CURRENT USE OF INSULIN (HCC): ICD-10-CM

## 2020-04-08 RX ORDER — PEN NEEDLE, DIABETIC 31 GX5/16"
NEEDLE, DISPOSABLE MISCELLANEOUS
Qty: 1 PACKAGE | Refills: 1 | Status: SHIPPED | OUTPATIENT
Start: 2020-04-08 | End: 2020-07-31

## 2020-04-13 RX ORDER — FLASH GLUCOSE SENSOR
KIT MISCELLANEOUS
Qty: 15 KIT | Refills: 0 | Status: SHIPPED | OUTPATIENT
Start: 2020-04-13 | End: 2022-02-17

## 2020-04-16 DIAGNOSIS — N52.1 ERECTILE DYSFUNCTION DUE TO DISEASES CLASSIFIED ELSEWHERE: ICD-10-CM

## 2020-04-16 RX ORDER — SILDENAFIL 100 MG/1
TABLET, FILM COATED ORAL
Qty: 16 TAB | Refills: 0 | Status: SHIPPED | OUTPATIENT
Start: 2020-04-16 | End: 2020-06-15

## 2020-05-25 DIAGNOSIS — E11.65 TYPE 2 DIABETES MELLITUS WITH HYPERGLYCEMIA, WITH LONG-TERM CURRENT USE OF INSULIN (HCC): ICD-10-CM

## 2020-05-25 DIAGNOSIS — E78.5 HYPERLIPIDEMIA, UNSPECIFIED HYPERLIPIDEMIA TYPE: ICD-10-CM

## 2020-05-25 DIAGNOSIS — Z79.4 TYPE 2 DIABETES MELLITUS WITH HYPERGLYCEMIA, WITH LONG-TERM CURRENT USE OF INSULIN (HCC): ICD-10-CM

## 2020-05-26 RX ORDER — ROSUVASTATIN CALCIUM 40 MG/1
TABLET, COATED ORAL
Qty: 60 TAB | Refills: 0 | Status: SHIPPED | OUTPATIENT
Start: 2020-05-26 | End: 2020-07-27

## 2020-05-26 RX ORDER — ALBUTEROL SULFATE 90 UG/1
AEROSOL, METERED RESPIRATORY (INHALATION)
Qty: 9 G | Refills: 0 | Status: SHIPPED | OUTPATIENT
Start: 2020-05-26 | End: 2020-06-15

## 2020-05-26 RX ORDER — LIRAGLUTIDE 6 MG/ML
INJECTION SUBCUTANEOUS
Qty: 15 ML | Refills: 1 | Status: SHIPPED | OUTPATIENT
Start: 2020-05-26 | End: 2020-09-15 | Stop reason: SDUPTHER

## 2020-05-27 DIAGNOSIS — I10 ESSENTIAL HYPERTENSION: ICD-10-CM

## 2020-05-27 RX ORDER — ENALAPRIL MALEATE AND HYDROCHLOROTHIAZIDE 10; 25 MG/1; MG/1
TABLET ORAL
Qty: 180 TAB | Refills: 1 | Status: SHIPPED | OUTPATIENT
Start: 2020-05-27 | End: 2020-10-29 | Stop reason: SDUPTHER

## 2020-06-14 DIAGNOSIS — N52.1 ERECTILE DYSFUNCTION DUE TO DISEASES CLASSIFIED ELSEWHERE: ICD-10-CM

## 2020-06-15 RX ORDER — SILDENAFIL 100 MG/1
TABLET, FILM COATED ORAL
Qty: 8 TAB | Refills: 0 | Status: SHIPPED | OUTPATIENT
Start: 2020-06-15 | End: 2020-07-06

## 2020-06-15 RX ORDER — ALBUTEROL SULFATE 90 UG/1
AEROSOL, METERED RESPIRATORY (INHALATION)
Qty: 9 G | Refills: 0 | Status: SHIPPED | OUTPATIENT
Start: 2020-06-15 | End: 2020-09-15

## 2020-06-30 VITALS
DIASTOLIC BLOOD PRESSURE: 101 MMHG | OXYGEN SATURATION: 98 % | TEMPERATURE: 98.4 F | SYSTOLIC BLOOD PRESSURE: 179 MMHG | BODY MASS INDEX: 39.17 KG/M2 | HEIGHT: 75 IN | HEART RATE: 78 BPM | RESPIRATION RATE: 18 BRPM | WEIGHT: 315 LBS

## 2020-06-30 DIAGNOSIS — Z79.4 TYPE 2 DIABETES MELLITUS WITH HYPERGLYCEMIA, WITH LONG-TERM CURRENT USE OF INSULIN (HCC): ICD-10-CM

## 2020-06-30 DIAGNOSIS — E11.65 TYPE 2 DIABETES MELLITUS WITH HYPERGLYCEMIA, WITH LONG-TERM CURRENT USE OF INSULIN (HCC): ICD-10-CM

## 2020-06-30 PROCEDURE — 99283 EMERGENCY DEPT VISIT LOW MDM: CPT

## 2020-07-01 ENCOUNTER — HOSPITAL ENCOUNTER (EMERGENCY)
Age: 50
Discharge: HOME | End: 2020-07-01
Attending: EMERGENCY MEDICINE
Payer: COMMERCIAL

## 2020-07-01 DIAGNOSIS — K08.89 DENTALGIA: ICD-10-CM

## 2020-07-01 DIAGNOSIS — R51.9 RIGHT SIDED FACIAL PAIN: Primary | ICD-10-CM

## 2020-07-01 DIAGNOSIS — R03.0 ELEVATED BLOOD PRESSURE READING: ICD-10-CM

## 2020-07-01 PROCEDURE — 74011000250 HC RX REV CODE- 250: Performed by: EMERGENCY MEDICINE

## 2020-07-01 PROCEDURE — 74011250637 HC RX REV CODE- 250/637: Performed by: EMERGENCY MEDICINE

## 2020-07-01 RX ORDER — LIDOCAINE HYDROCHLORIDE 40 MG/ML
SOLUTION TOPICAL
Status: COMPLETED | OUTPATIENT
Start: 2020-07-01 | End: 2020-07-01

## 2020-07-01 RX ORDER — METFORMIN HYDROCHLORIDE 1000 MG/1
TABLET ORAL
Qty: 180 TAB | Refills: 1 | Status: SHIPPED | OUTPATIENT
Start: 2020-07-01 | End: 2020-10-29 | Stop reason: SDUPTHER

## 2020-07-01 RX ORDER — PENICILLIN V POTASSIUM 250 MG/1
500 TABLET, FILM COATED ORAL
Status: COMPLETED | OUTPATIENT
Start: 2020-07-01 | End: 2020-07-01

## 2020-07-01 RX ORDER — DIPHENHYDRAMINE HCL 12.5MG/5ML
50 LIQUID (ML) ORAL
Status: COMPLETED | OUTPATIENT
Start: 2020-07-01 | End: 2020-07-01

## 2020-07-01 RX ORDER — TRAMADOL HYDROCHLORIDE 50 MG/1
50 TABLET ORAL
Qty: 10 TAB | Refills: 0 | Status: SHIPPED | OUTPATIENT
Start: 2020-07-01 | End: 2020-07-04

## 2020-07-01 RX ORDER — PENICILLIN V POTASSIUM 500 MG/1
500 TABLET, FILM COATED ORAL 4 TIMES DAILY
Qty: 40 TAB | Refills: 0 | Status: SHIPPED | OUTPATIENT
Start: 2020-07-01 | End: 2020-12-15

## 2020-07-01 RX ADMIN — PENICILLIN V POTASSIUM 500 MG: 250 TABLET, FILM COATED ORAL at 01:14

## 2020-07-01 RX ADMIN — DIPHENHYDRAMINE HYDROCHLORIDE 50 MG: 25 LIQUID ORAL at 01:08

## 2020-07-01 RX ADMIN — BENZOCAINE, BUTAMBEN, AND TETRACAINE HYDROCHLORIDE 1 SPRAY: .028; .004; .004 AEROSOL, SPRAY TOPICAL at 01:09

## 2020-07-01 RX ADMIN — LIDOCAINE HYDROCHLORIDE: 40 SOLUTION TOPICAL at 01:09

## 2020-07-01 NOTE — ED NOTES
Patient discharged by Noemi Ferrara MD. Patient provided with discharge instructions Rx and instructions on follow up care. Patient out of ED ambulatory accompanied by self.

## 2020-07-01 NOTE — ED TRIAGE NOTES
Pt stated that he has an abscess on the right side of his face. Pt stated that the abscess starts on his lateral jaw and moves into his neck and upper forehead. Pt stated that this has been going on for 2 weeks. Pt rated the pain 5/10 throbbing. Pt denies CP, sob, n/v, cough and fever. Dr. Otis Tineo at bedside.

## 2020-07-01 NOTE — ED PROVIDER NOTES
EMERGENCY DEPARTMENT HISTORY AND PHYSICAL EXAM     ------------------------------------------------------------------------------------------------------  Please note that this dictation was completed with DianDian, the [employer] voice recognition software. Quite often unanticipated grammatical, syntax, homophones, and other interpretive errors are inadvertently transcribed by the computer software. Please disregard these errors. Please excuse any errors that have escaped final proofreading.  -----------------------------------------------------------------------------------------------------------------    Date: 7/1/2020  Patient Name: James Green    History of Presenting Illness     Chief Complaint   Patient presents with    Facial Pain     Patient having pain from above his R ear down to his neck that has been happening for 2 weeks. Patient called his dentist today and states she thinks is an abcess and told him to come in. History Provided By: Patient    HPI: James Green is a 48 y.o. male, with significant pmhx of DM, HTN, FOSTER, who presents via private vehicle  to the ED with c/o right-sided facial pain that is been ongoing for the last 2 weeks. Patient notes that he spoke with his dentist earlier today who referred him to the emergency department for evaluation of possible dental abscess. Patient denies associated fever, foul taste or smell of his mouth. He notes that he has an appointment in the next 3 days with his dentist for further evaluation. Notes that he is been taking Aleve with some relief at times but notes that the pain is always there once the medication wears off. Pt also specifically denies any recent fevers, chills, CP, SOB, nausea, vomiting, diarrhea, abd pain, changes in BM, urinary sxs, or headache. PCP: Tarun Narvaez., NP    Social Hx: + tobacco, + EtOH, denies Illicit Drugs     There are no other complaints, changes, or physical findings at this time.      Allergies Allergen Reactions    Diazepam Other (comments)     Caused hallucinations, paranoia    Shellfish Derived Angioedema         Current Facility-Administered Medications   Medication Dose Route Frequency Provider Last Rate Last Dose    butamben-tetracaine-benzocaine (CETACAINE) 2 %-2 %-14 % (200 mg/sec) topical spray 1 Spray  1 Spray Topical ONCE Antoine Lesch, MD        diphenhydrAMINE (BENADRYL) 12.5 mg/5 mL oral liquid 50 mg  50 mg Oral NOW Antoine Lesch, MD        lidocaine (XYLOCAINE) 4 % (40 mg/mL) topical solution   Topical NOW Antoine Lesch, MD        penicillin v potassium (VEETID) tablet 500 mg  500 mg Oral NOW Antoine Lesch, MD         Current Outpatient Medications   Medication Sig Dispense Refill    penicillin v potassium (VEETID) 500 mg tablet Take 1 Tab by mouth four (4) times daily. 40 Tab 0    traMADoL (Ultram) 50 mg tablet Take 1 Tab by mouth every six (6) hours as needed for Pain for up to 3 days.  Max Daily Amount: 200 mg. 10 Tab 0    ProAir HFA 90 mcg/actuation inhaler INHALE 1 TO 2 PUFFS BY MOUTH EVERY 4 HOURS AS NEEDED FOR WHEEZING 9 g 0    sildenafil citrate (VIAGRA) 100 mg tablet TAKE 1 TABLET BY MOUTH ONCE DAILY AS NEEDED 8 Tab 0    enalapril-hydroCHLOROthiazide (VASERETIC) 10-25 mg tablet TAKE 2 TABLETS BY MOUTH DAILY 180 Tab 1    rosuvastatin (CRESTOR) 40 mg tablet Take 1 tablet by mouth nightly 60 Tab 0    Victoza 3-Demar 0.6 mg/0.1 mL (18 mg/3 mL) pnij ADMINISTER 1.8 MG UNDER THE SKIN DAILY 15 mL 1    cetirizine (ZYRTEC) 10 mg tablet TAKE 1 TABLET BY MOUTH ONCE DAILY AS NEEDED FOR ALLERGIES 90 Tab 0    FreeStyle Giovanni 14 Day Sensor kit USE TO CHECK BLOOD SUGAR AS DIRECTED 15 Kit 0    BD Ultra-Fine Short Pen Needle 31 gauge x 5/16\" ndle USE TO INJECT LEVAMIR& VICTOZA TWICE DAILY 1 Package 1    insulin detemir U-100 (Levemir FlexTouch U-100 Insuln) 100 unit/mL (3 mL) inpn INJECT 50 UNITS SUBCUTANEOUSLY EVERY NIGHT FOR DIABETES 15 mL 2    FREESTYLE GIOVANNI 14 DAY READER [facility] USE TO CHECK BLOOD SUGAR ONCE EVERY 2 WEEKS 2 Each 1    ONETOUCH ULTRA BLUE TEST STRIP strip CHECK BLOOD SUGAR THREE TIMES DAILY 100 Strip 11    Insulin Needles, Disposable, 30 gauge x 1/3\" USE TO INJECT LEVEMIR AND VICTOZA TWICE DAILY 1 Pen Needle 0    metFORMIN (GLUCOPHAGE) 1,000 mg tablet TAKE 1 TABLET BY MOUTH TWICE DAILY WITH MEALS FOR DIABETES. REPLACES JANUMET 180 Tab 1    Blood-Glucose Meter monitoring kit Check sugars TID. E11.65. Once touch 1 Kit 0    Lancets misc Use as directed. Dx: e11.65 check sugars TID 1 Each 11    aspirin delayed-release 81 mg tablet Take 1 Tab by mouth daily. 27 Tab 11       Past History     Past Medical History:  Past Medical History:   Diagnosis Date    Asthma     Diabetes ([facility]     HTN (hypertension) 3/30/2010    Hypertension     Other and unspecified hyperlipidemia 3/30/2010    Sleep apnea     Type II or unspecified type diabetes mellitus without mention of complication, uncontrolled 3/30/2010       Past Surgical History:  Past Surgical History:   Procedure Laterality Date    ABDOMEN SURGERY PROC UNLISTED      hernia repair       Family History:  Family History   Problem Relation Age of Onset    Diabetes Mother         cancer, liver? hep c    Hypertension Mother     Hypertension Father     Diabetes Brother     Hypertension Brother     Hypertension Maternal Uncle     Diabetes Maternal Grandmother     Diabetes Paternal Grandmother     Hypertension Maternal Uncle     Hypertension Maternal Uncle     Diabetes Paternal Uncle        Social History:  Social History     Tobacco Use    Smoking status: Current Some Day Smoker     Packs/day: 0.50     Years: 22.00     Pack years: 11.00     Types: Cigars    Smokeless tobacco: Never Used    Tobacco comment: black and mild   Substance Use Topics    Alcohol use: Yes     Alcohol/week: 10.0 standard drinks     Types: 10 Standard drinks or equivalent per week     Comment: Socially     Drug use:  No Allergies: Allergies   Allergen Reactions    Diazepam Other (comments)     Caused hallucinations, paranoia    Shellfish Derived Angioedema         Review of Systems   Review of Systems   Constitutional: Negative for chills and fever. HENT: Positive for facial swelling. Eyes: Negative. Respiratory: Negative for cough, chest tightness and shortness of breath. Cardiovascular: Negative for chest pain and leg swelling. Gastrointestinal: Negative for abdominal pain, diarrhea, nausea and vomiting. Endocrine: Negative. Genitourinary: Negative for difficulty urinating and dysuria. Musculoskeletal: Negative for myalgias. Skin: Negative. Neurological: Negative. Psychiatric/Behavioral: Negative. All other systems reviewed and are negative. Physical Exam   Physical Exam  Vitals signs and nursing note reviewed. Constitutional:       General: He is not in acute distress. Appearance: He is well-developed. He is not diaphoretic. HENT:      Head: Normocephalic and atraumatic. Nose: Nose normal.      Mouth/Throat:      Pharynx: No oropharyngeal exudate. Eyes:      Conjunctiva/sclera: Conjunctivae normal.      Pupils: Pupils are equal, round, and reactive to light. Neck:      Musculoskeletal: Normal range of motion and neck supple. Vascular: No JVD. Cardiovascular:      Rate and Rhythm: Normal rate and regular rhythm. Heart sounds: Normal heart sounds. No murmur. No friction rub. Pulmonary:      Effort: Pulmonary effort is normal. No respiratory distress. Breath sounds: Normal breath sounds. No stridor. No wheezing or rales. Abdominal:      General: Bowel sounds are normal. There is no distension. Palpations: Abdomen is soft. Tenderness: There is no abdominal tenderness. There is no rebound. Musculoskeletal: Normal range of motion. General: No tenderness. Skin:     General: Skin is warm and dry. Findings: No rash. Neurological:      Mental Status: He is alert and oriented to person, place, and time. Cranial Nerves: No cranial nerve deficit. Psychiatric:         Speech: Speech normal.         Behavior: Behavior normal.         Thought Content: Thought content normal.         Judgment: Judgment normal.           Diagnostic Study Results     Labs -   No results found for this or any previous visit (from the past 12 hour(s)). Radiologic Studies -   No orders to display     CT Results  (Last 48 hours)    None        CXR Results  (Last 48 hours)    None            Medical Decision Making   I am the first provider for this patient. I reviewed the vital signs, available nursing notes, past medical history, past surgical history, family history and social history. Vital Signs-Reviewed the patient's vital signs. Patient Vitals for the past 12 hrs:   Temp Pulse Resp BP SpO2   06/30/20 2336 98.4 °F (36.9 °C) 78 18 (!) 179/101 98 %       Pulse Oximetry Analysis - 98% on RA    Records Reviewed/Interpretted: Nursing Notes from triage and Old Medical Records     Provider Notes (Medical Decision Making):     DDX:  Dental abscess, periapical abscess    Plan:  Dental balls, Pen-Vee K    Impression:  Right-sided facial pain    ED Course:   Initial assessment performed. The patients presenting problems have been discussed, and they are in agreement with the care plan formulated and outlined with them. I have encouraged them to ask questions as they arise throughout their visit. I reviewed our electronic medical record system for any past medical records that were available that may contribute to the patients current condition, the nursing notes and and vital signs from today's visit  Nursing notes will be reviewed as they become available in realtime while the pt has been in the ED. Chelo Pandey MD        TOBACCO COUNSELING:  During evaluation pt reported that they are a current tobacco user.     I have spent 3 minutes discussing the medical risks of prolonged smoking habits and advised the patient of the benefits of the cessation of smoking, providing specific suggestions on how to quit. Pt has been counseled and encouraged to quit as soon as possible in order to decrease further risks to their health. Pt has conveyed their understanding of the risks involved should they continue to use tobacco products. Mirlande Peterson MD    HYPERTENSION COUNSELING:  Patient made aware of their elevated blood pressure and is instructed to follow up this week with their Primary Care or [address] [id number] for a recheck (should they be discharged.) Patient is counseled regarding consequences of chronic, uncontrolled hypertension including kidney disease, heart disease, stroke or even death. Patient states their understanding    I personally reviewed/interpreted pt's imaging. Agree with official read by radiology as noted above. Mirlande Peterson MD      12:38 AM  Progress note:  Pt noted to be feeling better, ready for discharge. Pt will follow up with his dentist as instructed. All questions have been answered, pt voiced understanding and agreement with plan. Abx were prescribed, pt advised that diarrhea and rash are possible side effects of the medications. Specific return precautions provided in addition to instructions for pt to return to the ED immediately should sx worsen at any time. Mirlande Peterson MD             Critical Care Time:     none      Diagnosis     Clinical Impression:   1. Right sided facial pain    2. Dentalgia    3. Elevated blood pressure reading        PLAN:  1. Current Discharge Medication List      START taking these medications    Details   penicillin v potassium (VEETID) 500 mg tablet Take 1 Tab by mouth four (4) times daily. Qty: 40 Tab, Refills: 0      traMADoL (Ultram) 50 mg tablet Take 1 Tab by mouth every six (6) hours as needed for Pain for up to 3 days.  Max Daily Amount: 200 mg.  Qty: 10 Tab, Refills: 0    Associated Diagnoses: Right sided facial pain           2. Follow-up Information     Follow up With Specialties Details Why Contact Allison Mcpherson NP Nurse Practitioner Schedule an appointment as soon as possible for a visit  [facility] Marie  [address] [place] [address]  [phone]          Return to ED if worse     Disposition:    12:38 AM   The patient's results have been reviewed with family and/or caregiver. They verbally convey their understanding and agreement of the patient's signs, symptoms, diagnosis, treatment and prognosis and additionally agree to follow up as recommended in the discharge instructions or to return to the Emergency Room should the patient's condition change prior to their follow-up appointment. The family and/or caregiver verbally agrees with the care-plan and all of their questions have been answered. The discharge instructions have also been provided to the them with educational information regarding the patient's diagnosis as well a list of reasons why the patient would want to return to the ER prior to their follow-up appointment should their condition change. Shon Pugh MD          This note will not be viewable in [address]

## 2020-07-01 NOTE — DISCHARGE INSTRUCTIONS
Patient Education        Head or Face Pain: Care Instructions  Your Care Instructions     Common causes of head or face pain are allergies, stress, and injuries. Other causes include tooth problems and sinus infections. Eating certain foods, such as chocolate or cheese, or drinking certain liquids, such as coffee or cola, can cause head pain for some people. If you have mild head pain, you may not need treatment. It is important to watch your symptoms and talk to your doctor if your pain continues or gets worse. Follow-up care is a key part of your treatment and safety. Be sure to make and go to all appointments, and call your doctor if you are having problems. It's also a good idea to know your test results and keep a list of the medicines you take. How can you care for yourself at home? · Take pain medicines exactly as directed. ? If the doctor gave you a prescription medicine for pain, take it as prescribed. ? If you are not taking a prescription pain medicine, ask your doctor if you can take an over-the-counter pain medicine. · Take it easy for the next few days or longer if you are not feeling well. · Use a warm, moist towel or heating pad set on low to relax tight muscles in your shoulder and neck. Have someone gently massage your neck and shoulders. · Put ice or a cold pack on the area for 10 to 20 minutes at a time. Put a thin cloth between the ice and your skin. When should you call for help? [id number] anytime you think you may need emergency care. For example, call if:  · You have twitching, jerking, or a seizure. · You passed out (lost consciousness). · You have symptoms of a stroke. These may include:  ? Sudden numbness, tingling, weakness, or loss of movement in your face, arm, or leg, especially on only one side of your body. ? Sudden vision changes. ? Sudden trouble speaking. ? Sudden confusion or trouble understanding simple statements. ? Sudden problems with walking or balance. ?  A sudden, severe headache that is different from past headaches. · You have jaw pain and pain in your chest, shoulder, neck, or arm. Call your doctor now or seek immediate medical care if:  · You have a fever with a stiff neck or a severe headache. · You have nausea and vomiting, or you cannot keep food or liquids down. Watch closely for changes in your health, and be sure to contact your doctor if:  · Your head or face pain does not get better as expected. Where can you learn more? Go to http://www.gray.com/  Enter P568 in the search box to learn more about \"Head or Face Pain: Care Instructions. \"  Current as of: June 26, 2019               Content Version: 12.5  © 3392-1462 [employer]. Care instructions adapted under license by [employer] (which disclaims liability or warranty for this information). If you have questions about a medical condition or this instruction, always ask your healthcare professional. [address] any warranty or liability for your use of this information.

## 2020-07-02 ENCOUNTER — PATIENT OUTREACH (OUTPATIENT)
Dept: INTERNAL MEDICINE CLINIC | Age: 50
End: 2020-07-02

## 2020-07-02 NOTE — PROGRESS NOTES
Patient contacted regarding recent discharge and COVID-19 risk. Discussed COVID-19 related testing which was not done at this time. Test results were not done. Patient informed of results, if available? N/A      Care Transition Nurse/ Ambulatory Care Manager contacted the patient by telephone to perform post discharge assessment; lvm requesting a return phone call to this ACM. Patient has following risk factors of: asthma and diabetes. Rx - veetid, tramadol. Pt was advised to f/u with PCP Dionte Lindsey, NP-BS Provider) post-discharge.

## 2020-07-06 ENCOUNTER — OFFICE VISIT (OUTPATIENT)
Dept: INTERNAL MEDICINE CLINIC | Age: 50
End: 2020-07-06

## 2020-07-06 VITALS
WEIGHT: 315 LBS | HEART RATE: 93 BPM | OXYGEN SATURATION: 96 % | DIASTOLIC BLOOD PRESSURE: 68 MMHG | BODY MASS INDEX: 42.16 KG/M2 | SYSTOLIC BLOOD PRESSURE: 115 MMHG | RESPIRATION RATE: 16 BRPM | TEMPERATURE: 98 F

## 2020-07-06 DIAGNOSIS — Z79.4 TYPE 2 DIABETES MELLITUS WITH HYPERGLYCEMIA, WITH LONG-TERM CURRENT USE OF INSULIN (HCC): Primary | ICD-10-CM

## 2020-07-06 DIAGNOSIS — E55.9 VITAMIN D DEFICIENCY: ICD-10-CM

## 2020-07-06 DIAGNOSIS — I10 ESSENTIAL HYPERTENSION: ICD-10-CM

## 2020-07-06 DIAGNOSIS — G47.33 OBSTRUCTIVE SLEEP APNEA SYNDROME: ICD-10-CM

## 2020-07-06 DIAGNOSIS — Z12.11 COLON CANCER SCREENING: ICD-10-CM

## 2020-07-06 DIAGNOSIS — E11.65 TYPE 2 DIABETES MELLITUS WITH HYPERGLYCEMIA, WITH LONG-TERM CURRENT USE OF INSULIN (HCC): Primary | ICD-10-CM

## 2020-07-06 DIAGNOSIS — N52.9 ERECTILE DYSFUNCTION, UNSPECIFIED ERECTILE DYSFUNCTION TYPE: ICD-10-CM

## 2020-07-06 RX ORDER — TADALAFIL 10 MG/1
10 TABLET ORAL
Qty: 20 TAB | Refills: 1 | Status: SHIPPED | OUTPATIENT
Start: 2020-07-06 | End: 2020-10-29 | Stop reason: SDUPTHER

## 2020-07-06 NOTE — PATIENT INSTRUCTIONS
Colon Cancer Screening: Care Instructions Your Care Instructions Colorectal cancer occurs in the colon or rectum. That's the lower part of your digestive system. It is the second-leading cause of cancer deaths in the [place]. It often starts with small growths called polyps in the colon or rectum. Polyps are usually found with screening tests. Depending on the type of test, any polyps found may be removed during the tests. Colorectal cancer usually does not cause symptoms at first. But regular tests can help find it early, before it spreads and becomes harder to treat. Your risk for colorectal cancer gets higher as you get older. Some experts say that adults should start regular screening at age 48 and stop at age 76. Others say to start before age 48 or continue after age 76. Talk with your doctor about your risk and when to start and stop screening. You may have one of several tests. Follow-up care is a key part of your treatment and safety. Be sure to make and go to all appointments, and call your doctor if you are having problems. It's also a good idea to know your test results and keep a list of the medicines you take. What are the main screening tests for colon cancer? The screening tests are: 
Stool tests. These include the guaiac fecal occult blood test (gFOBT), the fecal immunochemical test (FIT), and the combined fecal immunochemical test and stool DNA test (FIT-DNA). These tests check stool samples for signs of cancer. If your test is positive, you will need to have a colonoscopy. Sigmoidoscopy. This test lets your doctor look at the lining of your rectum and the lowest part of your colon. Your doctor uses a lighted tube called a sigmoidoscope. This test can't find cancers or polyps in the upper part of your colon. In some cases, polyps that are found can be removed. But if your doctor finds polyps, you will need to have a colonoscopy to check the upper part of your colon. Colonoscopy. This test lets your doctor look at the lining of your rectum and your entire colon. The doctor uses a thin, flexible tool called a colonoscope. It can also be used to remove polyps or get a tissue sample (biopsy). A less common test is CT colonography (CTC). It's also called virtual colonoscopy. Who should be screened for colorectal cancer? Your risk for colorectal cancer gets higher as you get older. Some experts say that adults should start regular screening at age 48 and stop at age 76. Others say to start before age 48 or continue after age 76. Talk with your doctor about your risk and when to start and stop screening. How often you need screening depends on the type of test you get: 
Stool tests. Every 1 or 2 years for FIT or gFOBT. Every 3 years for sDNA, also called FIT-DNA. Tests that look inside the colon. Every 5 or 10 years for sigmoidoscopy. Every 5 years for CT colonography (virtual colonoscopy). Every 10 years for colonoscopy. Experts agree that people at higher risk may need to be tested sooner. This includes people who have a strong family history of colon cancer. Talk to your doctor about which test is best for you and when to be tested. When should you call for help? Watch closely for changes in your health, and be sure to contact your doctor if: 
· You have any changes in your bowel habits. · You have any problems. Where can you learn more? Go to http://www.gray.com/ Enter M541 in the search box to learn more about \"Colon Cancer Screening: Care Instructions. \" Current as of: August 22, 2019               Content Version: 12.5 © 8539-2903 [employer] Care instructions adapted under license by [employer] (which disclaims liability or warranty for this information).  If you have questions about a medical condition or this instruction, always ask your healthcare professional. [address] any warranty or liability for your use of this information.

## 2020-07-06 NOTE — PROGRESS NOTES
Subjective: (As above and below)     Chief Complaint   Patient presents with    Diabetes     follow up     Lydia Segovia is a 48y.o. year old male who presents for     Hypertension ROS:  taking medications as instructed, no medication side effects noted, no TIAs, no chest pain on exertion, no dyspnea on exertion, no swelling of ankles      Diabetic Review of Systems - medication compliance: compliant all of the time, diabetic diet compliance: compliant most of the time, home glucose monitoring: is performed regularly has freestyle meter was getting some high numbers recently 200s. Is interested in dexcom meter when his current supplies run out    Wt Readings from Last 3 Encounters:   07/06/20 337 lb 4.8 oz (153 kg)   06/30/20 348 lb 5.2 oz (158 kg)   12/23/19 (!) 353 lb (160.1 kg)     Smoking: quit 1 week ago! ED: viagra is not working, has not tried any alternatives    Asthma: well controlled    Sleep apnea: adherent w/ cpap    Colonoscopy: no family hx of colon ca or GI complaints    Dental infxn: was recently put on PCN by ED for R sided dental pain, facial swelling - swelling is improving - he has fu w/ dentist next week      Reviewed PmHx, RxHx, FmHx, SocHx, AllgHx and updated in chart.   Family History   Problem Relation Age of Onset    Diabetes Mother         cancer, liver? hep c    Hypertension Mother     Hypertension Father     Diabetes Brother     Hypertension Brother     Hypertension Maternal Uncle     Diabetes Maternal Grandmother     Diabetes Paternal Grandmother     Hypertension Maternal Uncle     Hypertension Maternal Uncle     Diabetes Paternal Uncle        Past Medical History:   Diagnosis Date    Asthma     Diabetes ([facility] [address]     HTN (hypertension) 3/30/2010    Hypertension     Other and unspecified hyperlipidemia 3/30/2010    Sleep apnea     Type II or unspecified type diabetes mellitus without mention of complication, uncontrolled 3/30/2010      Social History     Socioeconomic History    Marital status: SINGLE     Spouse name: Not on file    Number of children: Not on file    Years of education: Not on file    Highest education level: Not on file   Tobacco Use    Smoking status: Current Some Day Smoker     Packs/day: 0.50     Years: 22.00     Pack years: 11.00     Types: Cigars    Smokeless tobacco: Never Used    Tobacco comment: black and mild   Substance and Sexual Activity    Alcohol use: Yes     Alcohol/week: 10.0 standard drinks     Types: 10 Standard drinks or equivalent per week     Comment: Socially     Drug use: No    Sexual activity: Yes     Partners: Female     Birth control/protection: Condom   Social History Narrative    6/6/17: works at [email or web address]          Current Outpatient Medications   Medication Sig    metFORMIN (GLUCOPHAGE) 1,000 mg tablet TAKE 1 TABLET BY MOUTH TWICE DAILY WITH MEALS FOR DIABETES( REPLACES JANUMET)    penicillin v potassium (VEETID) 500 mg tablet Take 1 Tab by mouth four (4) times daily.     ProAir HFA 90 mcg/actuation inhaler INHALE 1 TO 2 PUFFS BY MOUTH EVERY 4 HOURS AS NEEDED FOR WHEEZING    sildenafil citrate (VIAGRA) 100 mg tablet TAKE 1 TABLET BY MOUTH ONCE DAILY AS NEEDED    enalapril-hydroCHLOROthiazide (VASERETIC) 10-25 mg tablet TAKE 2 TABLETS BY MOUTH DAILY    rosuvastatin (CRESTOR) 40 mg tablet Take 1 tablet by mouth nightly    Victoza 3-Demar 0.6 mg/0.1 mL (18 mg/3 mL) pnij ADMINISTER 1.8 MG UNDER THE SKIN DAILY    cetirizine (ZYRTEC) 10 mg tablet TAKE 1 TABLET BY MOUTH ONCE DAILY AS NEEDED FOR ALLERGIES    FreeStyle Giovanni 14 Day Sensor kit USE TO CHECK BLOOD SUGAR AS DIRECTED    BD Ultra-Fine Short Pen Needle 31 gauge x 5/16\" ndle USE TO INJECT LEVAMIR& VICTOZA TWICE DAILY    insulin detemir U-100 (Levemir FlexTouch U-100 Insuln) 100 unit/mL (3 mL) inpn INJECT 50 UNITS SUBCUTANEOUSLY EVERY NIGHT FOR DIABETES    FREESTYLE GIOVANNI 14 DAY READER misc USE TO CHECK BLOOD SUGAR ONCE EVERY 2 WEEKS    ONETOUCH ULTRA BLUE TEST STRIP strip CHECK BLOOD SUGAR THREE TIMES DAILY    Insulin Needles, Disposable, 30 gauge x 1/3\" USE TO INJECT LEVEMIR AND VICTOZA TWICE DAILY    Blood-Glucose Meter monitoring kit Check sugars TID. E11.65. Once touch    Lancets misc Use as directed. Dx: e11.65 check sugars TID    aspirin delayed-release 81 mg tablet Take 1 Tab by mouth daily. No current facility-administered medications for this visit. Review of Systems:   Constitutional:    Negative for fever and chills, negative diaphoresis. HEENT:              Negative for neck pain and stiffness. Eyes:                  Negative for visual disturbance, itching, redness or discharge. Respiratory:        Negative for cough and shortness of breath. Cardiovascular:  Negative for chest pain and palpitations. Gastrointestinal: Negative for nausea, vomiting, abdominal pain, diarrhea or constipation. Genitourinary:     Negative for dysuria and frequency. Musculoskeletal: Negative for falls, tenderness and swelling. Skin:                    Negative for rash, masses or lesions. Neurological:       Negative for dizzyness, seizure, loss of consciousness, weakness and numbness. Objective:     Vitals:    07/06/20 0835   BP: 115/68   Pulse: 93   Resp: 16   Temp: 98 °F (36.7 °C)   TempSrc: Temporal   SpO2: 96%   Weight: 337 lb 4.8 oz (153 kg)         Physical Examination: General appearance - alert, well appearing, and in no distress and overweight  Chest - clear to auscultation, no wheezes, rales or rhonchi, symmetric air entry  Heart - normal rate, regular rhythm, normal S1, S2, no murmurs, rubs, clicks or gallops  Extremities - no pedal edema noted      Assessment/ Plan:      1. Type 2 diabetes mellitus with hyperglycemia, with long-term current use of insulin ([address]  Improving!  Discussed more medication vs diet - he states that he is aware of some room for improvement that he wants to work on - less bread!    - AMB POC HEMOGLOBIN A1C  - METABOLIC PANEL, COMPREHENSIVE  - CBC W/O DIFF    2. Colon cancer screening    - COLOGUARD TEST (FECAL DNA COLORECTAL CANCER SCREENING)    3. Essential hypertension      4. Obstructive sleep apnea syndrome  compliant    5. Vitamin D deficiency    - VITAMIN D, 25 HYDROXY; Future    6. Erectile dysfunction, unspecified erectile dysfunction type  viagra did not work  - tadalafiL (CIALIS) 10 mg tablet; Take 1 Tab by mouth daily as needed for Erectile Dysfunction. 30 minutes before intercourse  Dispense: 20 Tab; Refill: 1    Declines STI testing          I have discussed the diagnosis with the patient and the intended plan as seen in the above orders. The patient has received an after-visit summary and questions were answered concerning future plans. Pt conveyed understanding of plan. Medication Side Effects and Warnings were discussed with patient: yes  Patient Labs were reviewed: yes  Patient Past Records were reviewed:  yes    Cheryl Gupta.  Emmanuel Bowles NP

## 2020-07-06 NOTE — PROGRESS NOTES
Identified pt with two pt identifiers(name and ). Reviewed record in preparation for visit and have obtained necessary documentation. Chief Complaint   Patient presents with    Diabetes     follow up        Health Maintenance Due   Topic    DTaP/Tdap/Td series (1 - Tdap)    A1C test (Diabetic or Prediabetic)     Foot Exam Q1     Shingrix Vaccine Age 50> (1 of 2)    FOBT Q1Y Age 54-65        Coordination of Care Questionnaire:  :   1) Have you been to an emergency room, urgent care, or hospitalized since your last visit? If yes, where when, and reason for visit? yes   ED [facility] 2020 for mouth pain    2. Have seen or consulted any other health care provider since your last visit? If yes, where when, and reason for visit? NO      Patient is accompanied by self I have received verbal consent from Nathalie Jack to discuss any/all medical information while they are present in the room.     Visit Vitals  /68 (BP 1 Location: Left arm, BP Patient Position: Sitting)   Pulse 93   Temp 98 °F (36.7 °C) (Temporal)   Resp 16   Wt 337 lb 4.8 oz (153 kg)   SpO2 96%   BMI 42.16 kg/m²     Kathi Mota LPN

## 2020-07-06 NOTE — PROGRESS NOTES
7/6/2020  4:06 PM    Second patient outreach attempt by this ACM to perform initial post-discharge assessment; lvm requesting a return phone call to this ACM. [employer] message routed to patient with COVID-19 resources. COVID Care Transitions episode resolved at this time due to ACM inability to reach patient.

## 2020-07-07 LAB — HBA1C MFR BLD HPLC: 7.8 %

## 2020-07-31 DIAGNOSIS — E11.65 TYPE 2 DIABETES MELLITUS WITH HYPERGLYCEMIA, WITH LONG-TERM CURRENT USE OF INSULIN (HCC): ICD-10-CM

## 2020-07-31 DIAGNOSIS — Z79.4 TYPE 2 DIABETES MELLITUS WITH HYPERGLYCEMIA, WITH LONG-TERM CURRENT USE OF INSULIN (HCC): ICD-10-CM

## 2020-07-31 RX ORDER — PEN NEEDLE, DIABETIC 31 GX5/16"
NEEDLE, DISPOSABLE MISCELLANEOUS
Qty: 1 PACKAGE | Refills: 11 | Status: SHIPPED | OUTPATIENT
Start: 2020-07-31 | End: 2021-05-13 | Stop reason: SDUPTHER

## 2020-08-14 DIAGNOSIS — E11.65 TYPE 2 DIABETES MELLITUS WITH HYPERGLYCEMIA, WITH LONG-TERM CURRENT USE OF INSULIN (HCC): ICD-10-CM

## 2020-08-14 DIAGNOSIS — Z79.4 TYPE 2 DIABETES MELLITUS WITH HYPERGLYCEMIA, WITH LONG-TERM CURRENT USE OF INSULIN (HCC): ICD-10-CM

## 2020-08-14 RX ORDER — INSULIN DETEMIR 100 [IU]/ML
INJECTION, SOLUTION SUBCUTANEOUS
Qty: 15 ML | Refills: 0 | Status: SHIPPED | OUTPATIENT
Start: 2020-08-14 | End: 2020-09-15

## 2020-08-30 DIAGNOSIS — Z91.09 ENVIRONMENTAL ALLERGIES: ICD-10-CM

## 2020-08-30 DIAGNOSIS — E78.5 HYPERLIPIDEMIA, UNSPECIFIED HYPERLIPIDEMIA TYPE: ICD-10-CM

## 2020-08-31 RX ORDER — CETIRIZINE HCL 10 MG
TABLET ORAL
Qty: 60 TAB | Refills: 0 | Status: SHIPPED | OUTPATIENT
Start: 2020-08-31 | End: 2020-10-29 | Stop reason: SDUPTHER

## 2020-08-31 RX ORDER — ROSUVASTATIN CALCIUM 40 MG/1
TABLET, COATED ORAL
Qty: 30 TAB | Refills: 0 | Status: SHIPPED | OUTPATIENT
Start: 2020-08-31 | End: 2020-09-29

## 2020-09-14 DIAGNOSIS — Z79.4 TYPE 2 DIABETES MELLITUS WITH HYPERGLYCEMIA, WITH LONG-TERM CURRENT USE OF INSULIN (HCC): ICD-10-CM

## 2020-09-14 DIAGNOSIS — E11.65 TYPE 2 DIABETES MELLITUS WITH HYPERGLYCEMIA, WITH LONG-TERM CURRENT USE OF INSULIN (HCC): ICD-10-CM

## 2020-09-15 ENCOUNTER — TELEPHONE (OUTPATIENT)
Dept: INTERNAL MEDICINE CLINIC | Age: 50
End: 2020-09-15

## 2020-09-15 ENCOUNTER — VIRTUAL VISIT (OUTPATIENT)
Dept: INTERNAL MEDICINE CLINIC | Age: 50
End: 2020-09-15
Payer: COMMERCIAL

## 2020-09-15 DIAGNOSIS — Z79.4 TYPE 2 DIABETES MELLITUS WITH HYPERGLYCEMIA, WITH LONG-TERM CURRENT USE OF INSULIN (HCC): ICD-10-CM

## 2020-09-15 DIAGNOSIS — Z76.89 RETURN TO WORK EVALUATION: Primary | ICD-10-CM

## 2020-09-15 DIAGNOSIS — E11.65 TYPE 2 DIABETES MELLITUS WITH HYPERGLYCEMIA, WITH LONG-TERM CURRENT USE OF INSULIN (HCC): ICD-10-CM

## 2020-09-15 PROCEDURE — 99213 OFFICE O/P EST LOW 20 MIN: CPT | Performed by: NURSE PRACTITIONER

## 2020-09-15 PROCEDURE — 3051F HG A1C>EQUAL 7.0%<8.0%: CPT | Performed by: NURSE PRACTITIONER

## 2020-09-15 RX ORDER — LIRAGLUTIDE 6 MG/ML
INJECTION SUBCUTANEOUS
Qty: 15 ML | Refills: 1 | Status: SHIPPED | OUTPATIENT
Start: 2020-09-15 | End: 2021-02-04 | Stop reason: SDUPTHER

## 2020-09-15 RX ORDER — INSULIN DETEMIR 100 [IU]/ML
INJECTION, SOLUTION SUBCUTANEOUS
Qty: 15 ML | Refills: 0 | Status: SHIPPED | OUTPATIENT
Start: 2020-09-15 | End: 2020-11-05

## 2020-09-15 RX ORDER — ALBUTEROL SULFATE 90 UG/1
AEROSOL, METERED RESPIRATORY (INHALATION)
Qty: 9 G | Refills: 0 | Status: SHIPPED | OUTPATIENT
Start: 2020-09-15 | End: 2020-09-29

## 2020-09-15 RX ORDER — PENICILLIN V POTASSIUM 500 MG/1
500 TABLET, FILM COATED ORAL 4 TIMES DAILY
Qty: 40 TAB | Refills: 0 | Status: CANCELLED | OUTPATIENT
Start: 2020-09-15

## 2020-09-15 NOTE — PROGRESS NOTES
Anthony Sewell is a 48 y.o. male who was seen by synchronous (real-time) audio-video technology on 9/15/2020 for Follow-up; Cough (x 2-3 weeks, pt states cough is starting to go away pt states he has taken nyquil and mucinex, negative covid test ); and Letter for School/Work (RTW)        Assessment & Plan:   Diagnoses and all orders for this visit:    1. Return to work evaluation    2. Type 2 diabetes mellitus with hyperglycemia, with long-term current use of insulin (HCC)  -     liraglutide (Victoza 3-Demar) 0.6 mg/0.1 mL (18 mg/3 mL) pnij; ADMINISTER 1.8 MG UNDER THE SKIN DAILY      The complexity of medical decision making for this visit is moderate     He has appt next mo for DM      Subjective:       Prior to Admission medications    Medication Sig Start Date End Date Taking? Authorizing Provider   liraglutide (Victoza 3-Demar) 0.6 mg/0.1 mL (18 mg/3 mL) pnij ADMINISTER 1.8 MG UNDER THE SKIN DAILY 9/15/20  Yes Milena Valente., NP   rosuvastatin (CRESTOR) 40 mg tablet TAKE 1 TABLET BY MOUTH ONCE DAILY AT NIGHT 8/31/20  Yes Milena Valente., NP   cetirizine (ZYRTEC) 10 mg tablet TAKE 1 TABLET BY MOUTH ONCE DAILY AS NEEDED FOR ALLERGIES 8/31/20  Yes Milena Valente., NP   insulin detemir U-100 (Levemir FlexTouch U-100 Insuln) 100 unit/mL (3 mL) inpn INJECT 50 UNITS SUBCUTANEOUSLY ONCE DAILY AT NIGHT FOR DIABETES 8/14/20  Yes Milena Valente., NP   tadalafiL (CIALIS) 10 mg tablet Take 1 Tab by mouth daily as needed for Erectile Dysfunction.  30 minutes before intercourse 7/6/20  Yes Milena Pham, NP   metFORMIN (GLUCOPHAGE) 1,000 mg tablet TAKE 1 TABLET BY MOUTH TWICE DAILY WITH MEALS FOR DIABETES( REPLACES JANUMET) 7/1/20  Yes Milena Valente., NP   ProAir HFA 90 mcg/actuation inhaler INHALE 1 TO 2 PUFFS BY MOUTH EVERY 4 HOURS AS NEEDED FOR WHEEZING 6/15/20  Yes Milena Valente., NP   enalapril-hydroCHLOROthiazide (VASERETIC) 10-25 mg tablet TAKE 2 TABLETS BY MOUTH DAILY 5/27/20 Yes Cindy Pingree., NP   aspirin delayed-release 81 mg tablet Take 1 Tab by mouth daily. 8/12/14  Yes Trudy Palma MD   BD Ultra-Fine Short Pen Needle 31 gauge x 5/16\" ndle USE TO INJECT LEVEMIR AND VICTOZA UNDER THE SKIN TWICE DAILY 7/31/20   Cindy Pingree., NP   penicillin v potassium (VEETID) 500 mg tablet Take 1 Tab by mouth four (4) times daily. 7/1/20   Noemi Ferrara MD   Victoza 3-Demar 0.6 mg/0.1 mL (18 mg/3 mL) pnij ADMINISTER 1.8 MG UNDER THE SKIN DAILY 5/26/20 9/15/20  Cindy Pingree., NP   FreeStyle Giovanni 14 Day Sensor kit USE TO CHECK BLOOD SUGAR AS DIRECTED 4/13/20   Cindy Pingree., NP   FREESTYLE GIOVANNI 14 DAY READER misc USE TO CHECK BLOOD SUGAR ONCE EVERY 2 WEEKS 1/20/20   Cindy Pingree., NP   [facility] ULTRA BLUE TEST STRIP strip CHECK BLOOD SUGAR THREE TIMES DAILY 12/24/19   Cindy Pingree., NP   Insulin Needles, Disposable, 30 gauge x 1/3\" USE TO INJECT LEVEMIR AND VICTOZA TWICE DAILY 12/24/19   Cindy Pingree., NP   Blood-Glucose Meter monitoring kit Check sugars TID. E11.65. Once touch 4/19/18   Cindy Pingree., NP   Lancets misc Use as directed. Dx: e11.65 check sugars TID 4/19/18   Cindy Pingree., NP     Patient Active Problem List   Diagnosis Code    Type II diabetes mellitus, uncontrolled ([facility] [address] E11.65    Hypertension I10    Hyperlipidemia E78.5    Asthma J45.909    Sleep apnea G47.30    Obesity, morbid ([facility] [address] E66.01    Patient non adherence Z91.19    Smoker F17.200    Low vitamin D level R79.89       ROS     Cough: since 8/31- he went to  and tested negative for COVID-19. He has been taking mucinex and states that his cough is mostly better  No fevers, wheezing, SILVERMAN    Diabetic Review of Systems - medication compliance: compliant all of the time, diabetic diet compliance: noncompliant some of the time, home glucose monitoring: is performed regularly. Objective:   No flowsheet data found.      [INSTRUCTIONS:  \"[x]\" Indicates a positive item  \"[]\" Indicates a negative item  -- DELETE ALL ITEMS NOT EXAMINED]    Constitutional: [x] Appears well-developed and well-nourished [x] No apparent distress      [] Abnormal -     Mental status: [x] Alert and awake  [x] Oriented to person/place/time [x] Able to follow commands    [] Abnormal -     Eyes:   EOM    [x]  Normal    [] Abnormal -   Sclera  [x]  Normal    [] Abnormal -          Discharge [x]  None visible   [] Abnormal -     HENT: [x] Normocephalic, atraumatic  [] Abnormal -   [x] Mouth/Throat: Mucous membranes are moist    External Ears [x] Normal  [] Abnormal -    Neck: [x] No visualized mass [] Abnormal -     Pulmonary/Chest: [x] Respiratory effort normal   [x] No visualized signs of difficulty breathing or respiratory distress        [] Abnormal -      Musculoskeletal:   [x] Normal gait with no signs of ataxia         [x] Normal range of motion of neck        [] Abnormal -     Neurological:        [x] No Facial Asymmetry (Cranial nerve 7 motor function) (limited exam due to video visit)          [x] No gaze palsy        [] Abnormal -          Skin:        [x] No significant exanthematous lesions or discoloration noted on facial skin         [] Abnormal -            Psychiatric:       [x] Normal Affect [] Abnormal -        [x] No Hallucinations    Other pertinent observable physical exam findings:-        We discussed the expected course, resolution and complications of the diagnosis(es) in detail. Medication risks, benefits, costs, interactions, and alternatives were discussed as indicated. I advised him to contact the office if his condition worsens, changes or fails to improve as anticipated. He expressed understanding with the diagnosis(es) and plan.        Alyssazarina Alcocer, who was evaluated through a patient-initiated, synchronous (real-time) audio-video encounter, and/or his healthcare decision maker, is aware that it is a billable service, with coverage as determined by his insurance carrier. He provided verbal consent to proceed: Yes, and patient identification was verified. It was conducted pursuant to the emergency declaration under the [address] [place] [address] and the [email or web address] and [employer] Act. A caregiver was present when appropriate. Ability to conduct physical exam was limited. I was at home. The patient was at home. Lesa Vann NP

## 2020-09-15 NOTE — TELEPHONE ENCOUNTER
Pt states the note you gave him states he can return to work today. His employer would not let him come back because he is still coughing. He wants to know if you can extend the note  For him to return on Monday please.   Pt # [phone]

## 2020-09-15 NOTE — LETTER
NOTIFICATION RETURN TO WORK / SCHOOL 
 
9/15/2020 8:52 AM 
 
Mr. Halle Roldan [address] [phone] To Whom It May Concern: 
 
Halle Rlodan is currently under the care of Iris Williamson. He will return to work/school on: 9/15/20. If there are questions or concerns please have the patient contact our office. Sincerely, Lesa Ball NP

## 2020-09-29 DIAGNOSIS — E78.5 HYPERLIPIDEMIA, UNSPECIFIED HYPERLIPIDEMIA TYPE: ICD-10-CM

## 2020-09-29 RX ORDER — ROSUVASTATIN CALCIUM 40 MG/1
TABLET, COATED ORAL
Qty: 30 TAB | Refills: 0 | Status: SHIPPED | OUTPATIENT
Start: 2020-09-29 | End: 2020-10-29 | Stop reason: SDUPTHER

## 2020-09-29 RX ORDER — ALBUTEROL SULFATE 90 UG/1
AEROSOL, METERED RESPIRATORY (INHALATION)
Qty: 9 G | Refills: 0 | Status: SHIPPED | OUTPATIENT
Start: 2020-09-29 | End: 2020-10-27

## 2020-10-27 RX ORDER — ALBUTEROL SULFATE 90 UG/1
AEROSOL, METERED RESPIRATORY (INHALATION)
Qty: 9 G | Refills: 0 | Status: SHIPPED | OUTPATIENT
Start: 2020-10-27 | End: 2021-01-05

## 2020-10-29 ENCOUNTER — OFFICE VISIT (OUTPATIENT)
Dept: INTERNAL MEDICINE CLINIC | Age: 50
End: 2020-10-29
Payer: COMMERCIAL

## 2020-10-29 VITALS
HEART RATE: 100 BPM | WEIGHT: 315 LBS | OXYGEN SATURATION: 95 % | BODY MASS INDEX: 39.17 KG/M2 | RESPIRATION RATE: 16 BRPM | SYSTOLIC BLOOD PRESSURE: 141 MMHG | HEIGHT: 75 IN | TEMPERATURE: 99.2 F | DIASTOLIC BLOOD PRESSURE: 80 MMHG

## 2020-10-29 DIAGNOSIS — I10 ESSENTIAL HYPERTENSION: ICD-10-CM

## 2020-10-29 DIAGNOSIS — E11.65 TYPE 2 DIABETES MELLITUS WITH HYPERGLYCEMIA, WITH LONG-TERM CURRENT USE OF INSULIN (HCC): Primary | ICD-10-CM

## 2020-10-29 DIAGNOSIS — Z23 NEEDS FLU SHOT: ICD-10-CM

## 2020-10-29 DIAGNOSIS — E78.2 MIXED HYPERLIPIDEMIA: ICD-10-CM

## 2020-10-29 DIAGNOSIS — Z23 IMMUNIZATION DUE: ICD-10-CM

## 2020-10-29 DIAGNOSIS — N52.9 ERECTILE DYSFUNCTION, UNSPECIFIED ERECTILE DYSFUNCTION TYPE: ICD-10-CM

## 2020-10-29 DIAGNOSIS — M25.469 KNEE SWELLING: ICD-10-CM

## 2020-10-29 DIAGNOSIS — Z91.09 ENVIRONMENTAL ALLERGIES: ICD-10-CM

## 2020-10-29 DIAGNOSIS — Z79.4 TYPE 2 DIABETES MELLITUS WITH HYPERGLYCEMIA, WITH LONG-TERM CURRENT USE OF INSULIN (HCC): Primary | ICD-10-CM

## 2020-10-29 LAB
ALBUMIN UR QL STRIP: 30 MG/L
CREATININE, URINE POC: 100 MG/DL
HBA1C MFR BLD HPLC: 7.8 %
MICROALBUMIN/CREAT RATIO POC: <30 MG/G

## 2020-10-29 PROCEDURE — 82044 UR ALBUMIN SEMIQUANTITATIVE: CPT | Performed by: NURSE PRACTITIONER

## 2020-10-29 PROCEDURE — 90686 IIV4 VACC NO PRSV 0.5 ML IM: CPT

## 2020-10-29 PROCEDURE — 90471 IMMUNIZATION ADMIN: CPT

## 2020-10-29 PROCEDURE — 99214 OFFICE O/P EST MOD 30 MIN: CPT | Performed by: NURSE PRACTITIONER

## 2020-10-29 PROCEDURE — 83036 HEMOGLOBIN GLYCOSYLATED A1C: CPT | Performed by: NURSE PRACTITIONER

## 2020-10-29 PROCEDURE — 3051F HG A1C>EQUAL 7.0%<8.0%: CPT | Performed by: NURSE PRACTITIONER

## 2020-10-29 RX ORDER — ENALAPRIL MALEATE AND HYDROCHLOROTHIAZIDE 10; 25 MG/1; MG/1
2 TABLET ORAL DAILY
Qty: 180 TAB | Refills: 1 | Status: SHIPPED | OUTPATIENT
Start: 2020-10-29 | End: 2021-02-04 | Stop reason: SDUPTHER

## 2020-10-29 RX ORDER — ENALAPRIL MALEATE AND HYDROCHLOROTHIAZIDE 10; 25 MG/1; MG/1
1 TABLET ORAL DAILY
Qty: 180 TAB | Refills: 1 | Status: SHIPPED | OUTPATIENT
Start: 2020-10-29 | End: 2020-10-29 | Stop reason: SDUPTHER

## 2020-10-29 RX ORDER — CETIRIZINE HCL 10 MG
TABLET ORAL
Qty: 90 TAB | Refills: 1 | Status: SHIPPED | OUTPATIENT
Start: 2020-10-29 | End: 2021-02-04 | Stop reason: SDUPTHER

## 2020-10-29 RX ORDER — METFORMIN HYDROCHLORIDE 1000 MG/1
TABLET ORAL
Qty: 180 TAB | Refills: 1 | Status: SHIPPED | OUTPATIENT
Start: 2020-10-29 | End: 2021-02-04 | Stop reason: SDUPTHER

## 2020-10-29 RX ORDER — ZOSTER VACCINE RECOMBINANT, ADJUVANTED 50 MCG/0.5
0.5 KIT INTRAMUSCULAR ONCE
Qty: 0.5 ML | Refills: 1 | Status: SHIPPED | OUTPATIENT
Start: 2020-10-29 | End: 2020-10-29

## 2020-10-29 RX ORDER — ASPIRIN 81 MG/1
81 TABLET ORAL DAILY
Qty: 90 TAB | Refills: 1 | Status: SHIPPED | OUTPATIENT
Start: 2020-10-29 | End: 2021-02-04 | Stop reason: SDUPTHER

## 2020-10-29 RX ORDER — TADALAFIL 10 MG/1
10 TABLET ORAL
Qty: 20 TAB | Refills: 1 | Status: SHIPPED | OUTPATIENT
Start: 2020-10-29 | End: 2021-02-04 | Stop reason: SDUPTHER

## 2020-10-29 RX ORDER — ROSUVASTATIN CALCIUM 40 MG/1
40 TABLET, COATED ORAL
Qty: 90 TAB | Refills: 1 | Status: SHIPPED | OUTPATIENT
Start: 2020-10-29 | End: 2021-02-04 | Stop reason: SDUPTHER

## 2020-10-29 RX ORDER — PENICILLIN V POTASSIUM 500 MG/1
500 TABLET, FILM COATED ORAL 4 TIMES DAILY
Qty: 40 TAB | Refills: 0 | Status: CANCELLED | OUTPATIENT
Start: 2020-10-29

## 2020-10-29 NOTE — PATIENT INSTRUCTIONS
Empagliflozin (By mouth) Empagliflozin (ri-hv-baq-FLOE-zin) Treats type 2 diabetes. Also lowers risk of death in patients with type 2 diabetes and heart or blood vessel problems. Brand Name(s): Jardiance There may be other brand names for this medicine. When This Medicine Should Not Be Used: This medicine is not right for everyone. Do not use it if you had an allergic reaction to empagliflozin. How to Use This Medicine:  
Tablet · Take your medicine as directed. Your dose may need to be changed several times to find what works best for you. This medicine is usually taken in the morning. · Read and follow the patient instructions that come with this medicine. Talk to your doctor or pharmacist if you have any questions. · Missed dose: Take a dose as soon as you remember. If it is almost time for your next dose, wait until then and take a regular dose. Do not take extra medicine to make up for a missed dose. · Store the medicine in a closed container at room temperature, away from heat, moisture, and direct light. Drugs and Foods to Avoid: Ask your doctor or pharmacist before using any other medicine, including over-the-counter medicines, vitamins, and herbal products. · Some medicines can affect how empagliflozin works. Tell your doctor if you are using any of the following: ¨ Diuretic (water pill) ¨ Insulin or another diabetes medicine Warnings While Using This Medicine: · Tell your doctor if you are pregnant or breastfeeding, or if you have kidney disease, liver problems, congestive heart failure, high cholesterol, or a history of pancreas problems or genital yeast or urinary tract infections. Tell your doctor if you are on a low-salt diet or if you drink alcohol. · This medicine may cause the following problems: 
¨ Low blood pressure ¨ Ketoacidosis (high ketones and acid in the blood) ¨ Low blood sugar ¨ Kidney problems ¨ Increased risk of genital yeast or urinary tract infections · Tell any doctor or dentist who treats you that you are using this medicine. This medicine may affect certain medical test results. This medicine may affect the results of urine glucose tests. · Your doctor will do lab tests at regular visits to check on the effects of this medicine. Keep all appointments. · Keep all medicine out of the reach of children. Never share your medicine with anyone. Possible Side Effects While Using This Medicine:  
Call your doctor right away if you notice any of these side effects: · Allergic reaction: Itching or hives, swelling in your face or hands, swelling or tingling in your mouth or throat, chest tightness, trouble breathing · Change in how much or how often you urinate, bloody or cloudy urine, painful or difficult urination, lower back or side pain · Increased hunger, confusion, shaking, trembling, sweating · Lightheadedness, dizziness, fainting · Trouble breathing, tiredness, stomach pain, nausea, vomiting If you notice these less serious side effects, talk with your doctor: · Redness, itching, pain, or swelling of the penis, bad-smelling discharge from the penis · White or yellow vaginal discharge, vaginal itching or odor If you notice other side effects that you think are caused by this medicine, tell your doctor. Call your doctor for medical advice about side effects. You may report side effects to FDA at 7-638-FDA-8228 © 2017 [facility] Information is for End User's use only and may not be sold, redistributed or otherwise used for commercial purposes. The above information is an  only. It is not intended as medical advice for individual conditions or treatments. Talk to your doctor, nurse or pharmacist before following any medical regimen to see if it is safe and effective for you. Knee: Exercises Introduction Here are some examples of exercises for you to try.  The exercises may be suggested for a condition or for rehabilitation. Start each exercise slowly. Ease off the exercises if you start to have pain. You will be told when to start these exercises and which ones will work best for you. How to do the exercises [facility] [employer] 1. Sit with your leg straight and supported on the floor or a firm bed. (If you feel discomfort in the front or back of your knee, place a small towel roll under your knee.) 2. Tighten the muscles on top of your thigh by pressing the back of your knee flat down to the floor. (If you feel discomfort under your kneecap, place a small towel roll under your knee.) 3. Hold for about 6 seconds, then rest for up to 10 seconds. 4. Do 8 to 12 repetitions several times a day. Straight-leg raises to the front 1. Lie on your back with your good knee bent so that your foot rests flat on the floor. Your injured leg should be straight. Make sure that your low back has a normal curve. You should be able to slip your flat hand in between the floor and the small of your back, with your palm touching the floor and your back touching the back of your hand. 2. Tighten the thigh muscles in the injured leg by pressing the back of your knee flat down to the floor. Hold your knee straight. 3. Keeping the thigh muscles tight, lift your injured leg up so that your heel is about 12 inches off the floor. Hold for about 6 seconds and then lower slowly. 4. Do 8 to 12 repetitions, 3 times a day. Straight-leg raises to the outside 1. Lie on your side, with your injured leg on top. 2. Tighten the front thigh muscles of your injured leg to keep your knee straight. 3. Keep your hip and your leg straight in line with the rest of your body, and keep your knee pointing forward. Do not drop your hip back. 4. Lift your injured leg straight up toward the ceiling, about 12 inches off the floor. Hold for about 6 seconds, then slowly lower your leg. 5. Do 8 to 12 repetitions. Straight-leg raises to the back 1. Lie on your stomach, and lift your leg straight up behind you (toward the ceiling). 2. Lift your toes about 6 inches off the floor, hold for about 6 seconds, then lower slowly. 3. Do 8 to 12 repetitions. Straight-leg raises to the inside 1. Lie on the side of your body with the injured leg. 2. You can either prop your other (good) leg up on a chair, or you can bend your good knee and put that foot in front of your injured knee. Do not drop your hip back. 3. Tighten the muscles on the front of your thigh to straighten your injured knee. 4. Keep your kneecap pointing forward, and lift your whole leg up toward the ceiling about 6 inches. Hold for about 6 seconds, then lower slowly. 5. Do 8 to 12 repetitions. Heel dig bridging 1. Lie on your back with both knees bent and your ankles bent so that only your heels are digging into the floor. Your knees should be bent about 90 degrees. 2. Then push your heels into the floor, squeeze your buttocks, and lift your hips off the floor until your shoulders, hips, and knees are all in a straight line. 3. Hold for about 6 seconds as you continue to breathe normally, and then slowly lower your hips back down to the floor and rest for up to 10 seconds. 4. Do 8 to 12 repetitions. Hamstring curls 1. Lie on your stomach with your knees straight. If your kneecap is uncomfortable, roll up a washcloth and put it under your leg just above your kneecap. 2. Lift the foot of your injured leg by bending the knee so that you bring the foot up toward your buttock. If this motion hurts, try it without bending your knee quite as far. This may help you avoid any painful motion. 3. Slowly lower your leg back to the floor. 4. Do 8 to 12 repetitions. 5. With permission from your doctor or physical therapist, you may also want to add a cuff weight to your ankle (not more than 5 pounds).  With weight, you do not have to lift your leg more than 12 inches to get a hamstring workout. Shallow standing knee bends Do this exercise only if you have very little pain; if you have no clicking, locking, or giving way if you have an injured knee; and if it does not hurt while you are doing 8 to 12 repetitions. 1. Stand with your hands lightly resting on a counter or chair in front of you. Put your feet shoulder-width apart. 2. Slowly bend your knees so that you squat down like you are going to sit in a chair. Make sure your knees do not go in front of your toes. 3. Lower yourself about 6 inches. Your heels should remain on the floor at all times. 4. Rise slowly to a standing position. Heel raises 1. Stand with your feet a few inches apart, with your hands lightly resting on a counter or chair in front of you. 2. Slowly raise your heels off the floor while keeping your knees straight. 3. Hold for about 6 seconds, then slowly lower your heels to the floor. 4. Do 8 to 12 repetitions several times during the day. Follow-up care is a key part of your treatment and safety. Be sure to make and go to all appointments, and call your doctor if you are having problems. It's also a good idea to know your test results and keep a list of the medicines you take. Where can you learn more? Go to http://www.gray.com/ Enter F155 in the search box to learn more about \"Knee: Exercises. \" Current as of: March 2, 2020               Content Version: 12.6 © 2006-2020 [employer]. Care instructions adapted under license by [employer] (which disclaims liability or warranty for this information). If you have questions about a medical condition or this instruction, always ask your healthcare professional. [address] any warranty or liability for your use of this information. Vaccine Information Statement Influenza (Flu) Vaccine (Inactivated or Recombinant): What You Need to Know Many Vaccine Information Statements are available in Thai and other languages. See www.immunize.org/vis Hojas de información sobre vacunas están disponibles en español y en muchos otros idiomas. Visite www.immunize.org/vis 1. Why get vaccinated? Influenza vaccine can prevent influenza (flu). Flu is a contagious disease that spreads around the [place] every year, usually between October and May. Anyone can get the flu, but it is more dangerous for some people. Infants and young children, people 72years of age and older, pregnant women, and people with certain health conditions or a weakened immune system are at greatest risk of flu complications. Pneumonia, bronchitis, sinus infections and ear infections are examples of flu-related complications. If you have a medical condition, such as heart disease, cancer or diabetes, flu can make it worse. Flu can cause fever and chills, sore throat, muscle aches, fatigue, cough, headache, and runny or stuffy nose. Some people may have vomiting and diarrhea, though this is more common in children than adults. Each year thousands of people in the [facility] die from flu, and many more are hospitalized. Flu vaccine prevents millions of illnesses and flu-related visits to the doctor each year. 2. Influenza vaccines CDC recommends everyone 10months of age and older get vaccinated every flu season. Children 6 months through 6years of age may need 2 doses during a single flu season. Everyone else needs only 1 dose each flu season. It takes about 2 weeks for protection to develop after vaccination. There are many flu viruses, and they are always changing. Each year a new flu vaccine is made to protect against three or four viruses that are likely to cause disease in the upcoming flu season.  Even when the vaccine doesnt exactly match these viruses, it may still provide some protection. Influenza vaccine does not cause flu. Influenza vaccine may be given at the same time as other vaccines. 3. Talk with your health care provider Tell your vaccine provider if the person getting the vaccine: 
 Has had an allergic reaction after a previous dose of influenza vaccine, or has any severe, life-threatening allergies.  Has ever had Guillain-Barré Syndrome (also called GBS). In some cases, your health care provider may decide to postpone influenza vaccination to a future visit. People with minor illnesses, such as a cold, may be vaccinated. People who are moderately or severely ill should usually wait until they recover before getting influenza vaccine. Your health care provider can give you more information. 4. Risks of a reaction  Soreness, redness, and swelling where shot is given, fever, muscle aches, and headache can happen after influenza vaccine.  There may be a very small increased risk of Guillain-Barré Syndrome (GBS) after inactivated influenza vaccine (the flu shot). [facility] children who get the flu shot along with pneumococcal vaccine (PCV13), and/or DTaP vaccine at the same time might be slightly more likely to have a seizure caused by fever. Tell your health care provider if a child who is getting flu vaccine has ever had a seizure. People sometimes faint after medical procedures, including vaccination. Tell your provider if you feel dizzy or have vision changes or ringing in the ears. As with any medicine, there is a very remote chance of a vaccine causing a severe allergic reaction, other serious injury, or death. 5. What if there is a serious problem? An allergic reaction could occur after the vaccinated person leaves the clinic.  If you see signs of a severe allergic reaction (hives, swelling of the face and throat, difficulty breathing, a fast heartbeat, dizziness, or weakness), call 9-1-1 and get the person to the nearest hospital. 
 
For other signs that concern you, call your health care provider. Adverse reactions should be reported to the Vaccine Adverse Event Reporting System (VAERS). Your health care provider will usually file this report, or you can do it yourself. Visit the VAERS website at www.vaers. hhs.gov or call 6-821.416.8511. VAERS is only for reporting reactions, and VAERS staff do not give medical advice. 6. The National Vaccine Injury Compensation Program 
 
The [facility] Vaccine Injury Compensation Program (VICP) is a federal program that was created to compensate people who may have been injured by certain vaccines. Visit the VICP website at www.hrsa.gov/vaccinecompensation or call 4-922.980.6389 to learn about the program and about filing a claim. There is a time limit to file a claim for compensation. 7. How can I learn more?  Ask your health care provider.  Call your local or state health department.  Contact the Centers for Disease Control and Prevention (CDC): 
- Call 5-912.807.4636 (1-800-CDC-INFO) or 
- Visit CDCs influenza website at www.cdc.gov/flu Vaccine Information Statement (Interim) Inactivated Influenza Vaccine 8/15/2019 
42 THOMAS Velásquez Och [id number] Department of [facility] and [employer] Centers for Disease Control and Prevention Office Use Only

## 2020-10-29 NOTE — PROGRESS NOTES
Subjective: (As above and below)     Chief Complaint   Patient presents with    Diabetes     follow up     Knee Swelling     x 1 week, right knee     Marilee Salomon is a 48y.o. year old male who presents for     Diabetic Review of Systems - medication compliance: compliant all of the time, diabetic diet compliance: noncompliant some of the time, home glucose monitoring: is performed regularly. Eye exam: done in January    Hyperlipidemia: tolerating statin    Hypertension ROS:  taking medications as instructed, no medication side effects noted, no TIAs, no chest pain on exertion, no dyspnea on exertion, no swelling of ankles  BP Readings from Last 3 Encounters:   10/29/20 (!) 141/80   07/06/20 115/68   06/30/20 (!) 179/101       Erectile dysfunction: evelia is working    Cologuard: previously ordered    Knee swelling; x 1 week - when he elevates leg it improves  He has been walking more and thinks this flared it up, he feels clicking at times, since onset it seems to be improving    Weight:  He admits to eating worse since season change- was eating more salads and exercising over the summer  Wt Readings from Last 3 Encounters:   10/29/20 342 lb (155.1 kg)   07/06/20 337 lb 4.8 oz (153 kg)   06/30/20 348 lb 5.2 oz (158 kg)       Smoking: has been cutting back, works for [employer] and updated in chart.   Family History   Problem Relation Age of Onset    Diabetes Mother         cancer, liver? hep c    Hypertension Mother     Hypertension Father     Diabetes Brother     Hypertension Brother     Hypertension Maternal Uncle     Diabetes Maternal Grandmother     Diabetes Paternal Grandmother     Hypertension Maternal Uncle     Hypertension Maternal Uncle     Diabetes Paternal Uncle        Past Medical History:   Diagnosis Date    Asthma     Diabetes ([facility] [address]     HTN (hypertension) 3/30/2010    Hypertension     Other and unspecified hyperlipidemia 3/30/2010    Sleep apnea     Type II or unspecified type diabetes mellitus without mention of complication, uncontrolled 3/30/2010      Social History     Socioeconomic History    Marital status: SINGLE     Spouse name: Not on file    Number of children: Not on file    Years of education: Not on file    Highest education level: Not on file   Tobacco Use    Smoking status: Current Some Day Smoker     Packs/day: 0.50     Years: 22.00     Pack years: 11.00     Types: Cigars    Smokeless tobacco: Never Used    Tobacco comment: black and mild   Substance and Sexual Activity    Alcohol use: Yes     Alcohol/week: 10.0 standard drinks     Types: 10 Standard drinks or equivalent per week     Comment: Socially     Drug use: No    Sexual activity: Yes     Partners: Female     Birth control/protection: Condom   Social History Narrative    6/6/17: works at [employer]          Current Outpatient Medications   Medication Sig    aspirin delayed-release 81 mg tablet Take 1 Tab by mouth daily.  cetirizine (ZYRTEC) 10 mg tablet TAKE 1 TABLET BY MOUTH ONCE DAILY AS NEEDED FOR ALLERGIES    enalapril-hydroCHLOROthiazide (VASERETIC) 10-25 mg tablet Take 1 Tab by mouth daily.  metFORMIN (GLUCOPHAGE) 1,000 mg tablet TAKE 1 TABLET BY MOUTH TWICE DAILY WITH MEALS FOR DIABETES( REPLACES JANUMET)    rosuvastatin (CRESTOR) 40 mg tablet Take 1 Tab by mouth nightly.  tadalafiL (CIALIS) 10 mg tablet Take 1 Tab by mouth daily as needed for Erectile Dysfunction. 30 minutes before intercourse    empagliflozin (Jardiance) 10 mg tablet Take 1 Tab by mouth daily.  varicella-zoster recombinant, PF, (Shingrix, PF,) 50 mcg/0.5 mL susr injection 0.5 mL by IntraMUSCular route once for 1 dose.     ProAir HFA 90 mcg/actuation inhaler INHALE 1 TO 2 PUFFS BY MOUTH EVERY 4 HOURS AS NEEDED FOR WHEEZING    Levemir FlexTouch U-100 Insuln 100 unit/mL (3 mL) inpn INJECT 50 UNITS SUBCUTANEOUSLY ONCE DAILY AT NIGHT FOR DIABETES    liraglutide (Victoza 3-Demar) 0.6 mg/0.1 mL (18 mg/3 mL) pnij ADMINISTER 1.8 MG UNDER THE SKIN DAILY    BD Ultra-Fine Short Pen Needle 31 gauge x 5/16\" ndle USE TO INJECT LEVEMIR AND VICTOZA UNDER THE SKIN TWICE DAILY    penicillin v potassium (VEETID) 500 mg tablet Take 1 Tab by mouth four (4) times daily.  FreeStyle Giovanni 14 Day Sensor kit USE TO CHECK BLOOD SUGAR AS DIRECTED    FREESTYLE GIOVANNI 14 DAY READER misc USE TO CHECK BLOOD SUGAR ONCE EVERY 2 WEEKS    ONETOUCH ULTRA BLUE TEST STRIP strip CHECK BLOOD SUGAR THREE TIMES DAILY    Insulin Needles, Disposable, 30 gauge x 1/3\" USE TO INJECT LEVEMIR AND VICTOZA TWICE DAILY    Blood-Glucose Meter monitoring kit Check sugars TID. E11.65. Once touch    Lancets misc Use as directed. Dx: e11.65 check sugars TID     No current facility-administered medications for this visit. Review of Systems:   Constitutional:    Negative for fever and chills, negative diaphoresis. HEENT:              Negative for neck pain and stiffness. Eyes:                  Negative for visual disturbance, itching, redness or discharge. Respiratory:        Negative for cough and shortness of breath. Cardiovascular:  Negative for chest pain and palpitations. Gastrointestinal: Negative for nausea, vomiting, abdominal pain, diarrhea or constipation. Genitourinary:     Negative for dysuria and frequency. Musculoskeletal: Negative for falls, tenderness and +R knee swelling  Skin:                    Negative for rash, masses or lesions. Neurological:       Negative for dizzyness, seizure, loss of consciousness, weakness and numbness.      Objective:     Vitals:    10/29/20 0833   BP: (!) 141/80   Pulse: 100   Resp: 16   Temp: 99.2 °F (37.3 °C)   TempSrc: Temporal   SpO2: 95%   Weight: 342 lb (155.1 kg)   Height: 6' 3\" (1.905 m)     Results for orders placed or performed in visit on 10/29/20   AMB POC HEMOGLOBIN A1C   Result Value Ref Range    Hemoglobin A1c (POC) 7.8 %   AMB POC URINE, MICROALBUMIN, SEMIQUANT (3 RESULTS)   Result Value Ref Range    ALBUMIN, URINE POC 30 Negative mg/L    CREATININE, URINE  mg/dL    Microalbumin/creat ratio (POC) <30 <30 MG/G         Gen: Oriented to person, place and time and well-developed, well-nourished and in no distress. HEENT:    Head: normocephalic and atraumatic. Eyes:  EOM are normal. Pupils equal and round. Neck:  Normal range of motion. Neck supple. Cardiovascular: normal rate, regular rhythm and normal heart sounds. Pulmonary/Chest:  Effort normal and breath sounds normal.  No respiratory distress. No wheezes, no rales. Abdominal: soft, normal  bowel sounds. Musculoskeletal:  No edema, no tenderness. No calf tenderness or edema. R knee: no swelling noted, no pain on exam, no crepitus noted  Neurological:  Alert, oriented to person, place and time. Skin: skin is warm and dry. Diabetic foot exam:     Left Foot:   Visual Exam: normal    Pulse DP: 2+ (normal)   Filament test: normal sensation    Vibratory sensation: normal      Right Foot:   Visual Exam: normal    Pulse DP: 2+ (normal)   Filament test: normal sensation    Vibratory sensation: normal      Assessment/ Plan:     Follow-up and Dispositions    · Return in about 3 months (around 1/29/2021). 1. Type 2 diabetes mellitus with hyperglycemia, with long-term current use of insulin ([facility])  New med  - AMB POC HEMOGLOBIN A1C  - AMB POC URINE, MICROALBUMIN, SEMIQUANT (3 RESULTS)  -  DIABETES FOOT EXAM  - metFORMIN (GLUCOPHAGE) 1,000 mg tablet; TAKE 1 TABLET BY MOUTH TWICE DAILY WITH MEALS FOR DIABETES( REPLACES JANUMET)  Dispense: 180 Tab; Refill: 1  - METABOLIC PANEL, BASIC  - CBC W/O DIFF    2. Essential hypertension  Typically better, cont exercise, diet   - enalapril-hydroCHLOROthiazide (VASERETIC) 10-25 mg tablet; Take 1 Tab by mouth daily. Dispense: 180 Tab; Refill: 1    3.  Erectile dysfunction, unspecified erectile dysfunction type    - tadalafiL (CIALIS) 10 mg tablet; Take 1 Tab by mouth daily as needed for Erectile Dysfunction. 30 minutes before intercourse  Dispense: 20 Tab; Refill: 1    4. Mixed hyperlipidemia    - aspirin delayed-release 81 mg tablet; Take 1 Tab by mouth daily. Dispense: 90 Tab; Refill: 1  - rosuvastatin (CRESTOR) 40 mg tablet; Take 1 Tab by mouth nightly. Dispense: 90 Tab; Refill: 1    5. Environmental allergies    - cetirizine (ZYRTEC) 10 mg tablet; TAKE 1 TABLET BY MOUTH ONCE DAILY AS NEEDED FOR ALLERGIES  Dispense: 90 Tab; Refill: 1    6. Needs flu shot    - INFLUENZA VIRUS VAC QUAD,SPLIT,PRESV FREE SYRINGE IM    7. Immunization due    - varicella-zoster recombinant, PF, (Shingrix, PF,) 50 mcg/0.5 mL susr injection; 0.5 mL by IntraMUSCular route once for 1 dose. Dispense: 0.5 mL; Refill: 1    8. Knee swelling  Improving since onset, fu INI        I have discussed the diagnosis with the patient and the intended plan as seen in the above orders. The patient has received an after-visit summary and questions were answered concerning future plans. Pt conveyed understanding of plan. Medication Side Effects and Warnings were discussed with patient: yes  Patient Labs were reviewed: yes  Patient Past Records were reviewed:  yes    Naveed Craven.  Pura Thomas NP

## 2020-10-29 NOTE — PROGRESS NOTES
Chief Complaint   Patient presents with    Diabetes     follow up     Knee Swelling     x 1 week, right knee       1. Have you been to the ER, urgent care clinic since your last visit? Hospitalized since your last visit? No    2. Have you seen or consulted any other health care providers outside of the [address] [place] [address] since your last visit? Include any pap smears or colon screening. No    Immunization/s administered 10/29/2020 by Christine Blackmon with guardian's consent. Patient tolerated procedure well. No reactions noted.

## 2020-11-05 DIAGNOSIS — Z79.4 TYPE 2 DIABETES MELLITUS WITH HYPERGLYCEMIA, WITH LONG-TERM CURRENT USE OF INSULIN (HCC): ICD-10-CM

## 2020-11-05 DIAGNOSIS — E11.65 TYPE 2 DIABETES MELLITUS WITH HYPERGLYCEMIA, WITH LONG-TERM CURRENT USE OF INSULIN (HCC): ICD-10-CM

## 2020-11-05 RX ORDER — INSULIN DETEMIR 100 [IU]/ML
INJECTION, SOLUTION SUBCUTANEOUS
Qty: 15 ML | Refills: 0 | Status: SHIPPED | OUTPATIENT
Start: 2020-11-05 | End: 2020-12-02

## 2020-11-22 ENCOUNTER — HOSPITAL ENCOUNTER (EMERGENCY)
Age: 50
Discharge: HOME | End: 2020-11-22
Attending: EMERGENCY MEDICINE
Payer: COMMERCIAL

## 2020-11-22 VITALS
WEIGHT: 315 LBS | TEMPERATURE: 97.9 F | BODY MASS INDEX: 39.17 KG/M2 | HEIGHT: 75 IN | DIASTOLIC BLOOD PRESSURE: 92 MMHG | SYSTOLIC BLOOD PRESSURE: 172 MMHG | HEART RATE: 90 BPM | RESPIRATION RATE: 18 BRPM | OXYGEN SATURATION: 99 %

## 2020-11-22 DIAGNOSIS — K02.9 PAIN DUE TO DENTAL CARIES: Primary | ICD-10-CM

## 2020-11-22 PROCEDURE — 74011250637 HC RX REV CODE- 250/637: Performed by: EMERGENCY MEDICINE

## 2020-11-22 PROCEDURE — 99282 EMERGENCY DEPT VISIT SF MDM: CPT

## 2020-11-22 PROCEDURE — 74011000250 HC RX REV CODE- 250: Performed by: EMERGENCY MEDICINE

## 2020-11-22 RX ORDER — AMOXICILLIN 500 MG/1
500 TABLET, FILM COATED ORAL 3 TIMES DAILY
Qty: 21 TAB | Refills: 0 | Status: SHIPPED | OUTPATIENT
Start: 2020-11-22 | End: 2020-12-15 | Stop reason: ALTCHOICE

## 2020-11-22 RX ORDER — HYDROCODONE BITARTRATE AND ACETAMINOPHEN 5; 325 MG/1; MG/1
1 TABLET ORAL
Qty: 10 TAB | Refills: 0 | Status: SHIPPED | OUTPATIENT
Start: 2020-11-22 | End: 2020-11-25

## 2020-11-22 RX ADMIN — LIDOCAINE HYDROCHLORIDE: 20 SOLUTION ORAL; TOPICAL at 23:36

## 2020-11-23 ENCOUNTER — PATIENT OUTREACH (OUTPATIENT)
Dept: CASE MANAGEMENT | Age: 50
End: 2020-11-23

## 2020-11-23 NOTE — ED PROVIDER NOTES
EMERGENCY DEPARTMENT HISTORY AND PHYSICAL EXAM      Date: 11/22/2020  Patient Name: Leodan Rios    History of Presenting Illness     Chief Complaint   Patient presents with    Dental Pain     ED visit d/t dental pain - onset of sxs, today at 1500 - given medications with no relief - pt with one tooth extraction on tuesday - Denies fevers        History Provided By: Patient    HPI: Leodan Rios, 48 y.o. male with PMHx significant for asthma, diabetes, hypertension, sleep apnea presents to the ED with chief complaint of left upper dental pain. Patient reports he has been having pain in this area for 2 to 3 months. He is seen a dentist for this issue, but the dentist decided to extract a different tooth first because he thought that one was worse. He has an appointment to go back to his doctor for extractions on December 22. He has been taking 800 mg of ibuprofen and amoxicillin which was prescribed by his dentist, but his pain got worse today. He denies any fevers, chills, difficulty opening and closing his mouth, difficulty swallowing, tongue swelling or facial swelling. PCP: Nabeel Bo., NP    No current facility-administered medications on file prior to encounter. Current Outpatient Medications on File Prior to Encounter   Medication Sig Dispense Refill    Levemir FlexTouch U-100 Insuln 100 unit/mL (3 mL) inpn INJECT 50 UNITS SUBCUTANEOUSLY ONCE DAILY AT NIGHT FOR DIABETES 15 mL 0    aspirin delayed-release 81 mg tablet Take 1 Tab by mouth daily. 90 Tab 1    cetirizine (ZYRTEC) 10 mg tablet TAKE 1 TABLET BY MOUTH ONCE DAILY AS NEEDED FOR ALLERGIES 90 Tab 1    metFORMIN (GLUCOPHAGE) 1,000 mg tablet TAKE 1 TABLET BY MOUTH TWICE DAILY WITH MEALS FOR DIABETES( REPLACES JANUMET) 180 Tab 1    rosuvastatin (CRESTOR) 40 mg tablet Take 1 Tab by mouth nightly. 90 Tab 1    tadalafiL (CIALIS) 10 mg tablet Take 1 Tab by mouth daily as needed for Erectile Dysfunction.  30 minutes before intercourse 20 Tab 1    empagliflozin (Jardiance) 10 mg tablet Take 1 Tab by mouth daily. 90 Tab 0    enalapril-hydroCHLOROthiazide (VASERETIC) 10-25 mg tablet Take 2 Tabs by mouth daily. 180 Tab 1    ProAir HFA 90 mcg/actuation inhaler INHALE 1 TO 2 PUFFS BY MOUTH EVERY 4 HOURS AS NEEDED FOR WHEEZING 9 g 0    liraglutide (Victoza 3-Demar) 0.6 mg/0.1 mL (18 mg/3 mL) pnij ADMINISTER 1.8 MG UNDER THE SKIN DAILY 15 mL 1    BD Ultra-Fine Short Pen Needle 31 gauge x 5/16\" ndle USE TO INJECT LEVEMIR AND VICTOZA UNDER THE SKIN TWICE DAILY 1 Package 11    penicillin v potassium (VEETID) 500 mg tablet Take 1 Tab by mouth four (4) times daily. 40 Tab 0    FreeStyle Giovanni 14 Day Sensor kit USE TO CHECK BLOOD SUGAR AS DIRECTED 15 Kit 0    FREESTYLE GIOVANNI 14 DAY READER misc USE TO CHECK BLOOD SUGAR ONCE EVERY 2 WEEKS 2 Each 1    ONETOUCH ULTRA BLUE TEST STRIP strip CHECK BLOOD SUGAR THREE TIMES DAILY 100 Strip 11    Insulin Needles, Disposable, 30 gauge x 1/3\" USE TO INJECT LEVEMIR AND VICTOZA TWICE DAILY 1 Pen Needle 0    Blood-Glucose Meter monitoring kit Check sugars TID. E11.65. Once touch 1 Kit 0    Lancets misc Use as directed.  Dx: e11.65 check sugars TID 1 Each 11       Past History     Past Medical History:  Past Medical History:   Diagnosis Date    Asthma     Diabetes ([facility] [address]     HTN (hypertension) 3/30/2010    Hypertension     Other and unspecified hyperlipidemia 3/30/2010    Sleep apnea     Type II or unspecified type diabetes mellitus without mention of complication, uncontrolled 3/30/2010       Past Surgical History:  Past Surgical History:   Procedure Laterality Date    ABDOMEN SURGERY PROC UNLISTED      hernia repair       Family History:  Family History   Problem Relation Age of Onset    Diabetes Mother         cancer, liver? hep c    Hypertension Mother     Hypertension Father     Diabetes Brother     Hypertension Brother     Hypertension Maternal Uncle     Diabetes Maternal Grandmother     Diabetes Paternal Grandmother     Hypertension Maternal Uncle     Hypertension Maternal Uncle     Diabetes Paternal Uncle        Social History:  Social History     Tobacco Use    Smoking status: Current Some Day Smoker     Packs/day: 0.50     Years: 22.00     Pack years: 11.00     Types: Cigars    Smokeless tobacco: Never Used    Tobacco comment: black and mild   Substance Use Topics    Alcohol use: Yes     Alcohol/week: 10.0 standard drinks     Types: 10 Standard drinks or equivalent per week     Comment: Socially     Drug use: No       Allergies: Allergies   Allergen Reactions    Diazepam Other (comments)     Caused hallucinations, paranoia    Shellfish Derived Angioedema         Review of Systems   Review of Systems   Constitutional: Negative for chills and fever. HENT: Positive for dental problem. Negative for congestion, drooling, facial swelling, mouth sores, sinus pain and sore throat. Eyes: Negative. Respiratory: Negative for cough, chest tightness, shortness of breath and wheezing. Cardiovascular: Negative for chest pain and palpitations. Gastrointestinal: Negative for abdominal pain, diarrhea, nausea and vomiting. Genitourinary: Negative for dysuria, flank pain and hematuria. Musculoskeletal: Negative for back pain and myalgias. Skin: Negative for rash and wound. Neurological: Negative for dizziness, syncope, light-headedness and headaches. Psychiatric/Behavioral: Negative for confusion. The patient is not nervous/anxious. All other systems reviewed and are negative.         Physical Exam    General appearance - well nourished, well appearing, and in no distress  Eyes - pupils equal and reactive, extraocular eye movements intact  ENT - mucous membranes moist, pharynx normal without lesions, widespread dental caries, tooth #14 with obvious caries and tender to palpation, no surrounding gum edema or visible abscess  Neck - supple, no significant adenopathy; non-tender to palpation  Chest - clear to auscultation, no wheezes, rales or rhonchi; non-tender to palpation  Heart - normal rate and regular rhythm, S1 and S2 normal, no murmurs noted  Abdomen - soft, nontender, nondistended, no masses or organomegaly  Musculoskeletal - no joint tenderness, deformity or swelling; normal ROM  Extremities - peripheral pulses normal, no pedal edema  Skin - normal coloration and turgor, no rashes  Neurological - alert, oriented x3, normal speech, no focal findings or movement disorder noted    Diagnostic Study Results     Labs -   No results found for this or any previous visit (from the past 12 hour(s)). Radiologic Studies -   No orders to display     CT Results  (Last 48 hours)    None        CXR Results  (Last 48 hours)    None            Medical Decision Making   I am the first provider for this patient. I reviewed the vital signs, available nursing notes, past medical history, past surgical history, family history and social history. Vital Signs-Reviewed the patient's vital signs. Patient Vitals for the past 12 hrs:   Temp Pulse Resp BP SpO2   11/22/20 2146 97.9 °F (36.6 °C) 90 18 (!) 172/92 99 %         Records Reviewed: Nursing Notes and Old Medical Records    Provider Notes (Medical Decision Making):   Differential diagnosis: Dental caries, dental fracture, abscess  ED Course:   Initial assessment performed. The patients presenting problems have been discussed, and they are in agreement with the care plan formulated and outlined with them. I have encouraged them to ask questions as they arise throughout their visit. Progress Notes:  ED Course as of Nov 23 2314   Martha Pete Nov 22, 2020   2333 Patient is declining a dental block at this time.    [AO]      ED Course User Index  [AO] Dayami Massey MD       Disposition:  Patient provided dental balls and offered a dental block, however he declined. PLAN:  1.    Discharge Medication List as of 11/22/2020 11:34 PM      START taking these medications    Details   HYDROcodone-acetaminophen (Norco) 5-325 mg per tablet Take 1 Tab by mouth every eight (8) hours as needed for Pain for up to 3 days. Max Daily Amount: 3 Tabs., Normal, Disp-10 Tab,R-0      amoxicillin 500 mg tab Take 500 mg by mouth three (3) times daily. , Normal, Disp-21 Tab,R-0         CONTINUE these medications which have NOT CHANGED    Details   Levemir FlexTouch U-100 Insuln 100 unit/mL (3 mL) inpn INJECT 50 UNITS SUBCUTANEOUSLY ONCE DAILY AT NIGHT FOR DIABETES, Normal, Disp-15 mL,R-0      aspirin delayed-release 81 mg tablet Take 1 Tab by mouth daily. , Normal, Disp-90 Tab,R-1      cetirizine (ZYRTEC) 10 mg tablet TAKE 1 TABLET BY MOUTH ONCE DAILY AS NEEDED FOR ALLERGIES, Normal, Disp-90 Tab,R-1      metFORMIN (GLUCOPHAGE) 1,000 mg tablet TAKE 1 TABLET BY MOUTH TWICE DAILY WITH MEALS FOR DIABETES( REPLACES JANUMET), Normal, Disp-180 Tab,R-1      rosuvastatin (CRESTOR) 40 mg tablet Take 1 Tab by mouth nightly., Normal, Disp-90 Tab,R-1      tadalafiL (CIALIS) 10 mg tablet Take 1 Tab by mouth daily as needed for Erectile Dysfunction. 30 minutes before intercourse, Normal, Disp-20 Tab,R-1      empagliflozin (Jardiance) 10 mg tablet Take 1 Tab by mouth daily. , Normal, Disp-90 Tab,R-0      enalapril-hydroCHLOROthiazide (VASERETIC) 10-25 mg tablet Take 2 Tabs by mouth daily. , Normal, Disp-180 Tab,R-1      ProAir HFA 90 mcg/actuation inhaler INHALE 1 TO 2 PUFFS BY MOUTH EVERY 4 HOURS AS NEEDED FOR WHEEZING, Normal, Disp-9 g,R-0,KIMO      liraglutide (Victoza 3-Demar) 0.6 mg/0.1 mL (18 mg/3 mL) pnij ADMINISTER 1.8 MG UNDER THE SKIN DAILY, Normal, Disp-15 mL,R-1      BD Ultra-Fine Short Pen Needle 31 gauge x 5/16\" ndle USE TO INJECT LEVEMIR AND VICTOZA UNDER THE SKIN TWICE DAILY, Normal, Disp-1 Package,R-11      penicillin v potassium (VEETID) 500 mg tablet Take 1 Tab by mouth four (4) times daily. , Normal, Disp-40 Tab, R-0      FreeStyle Giovanni 14 Day Sensor kit USE TO CHECK BLOOD SUGAR AS DIRECTED, Normal, Disp-15 Kit, R-0      FREESTYLE FERMIN 14 DAY READER misc USE TO CHECK BLOOD SUGAR ONCE EVERY 2 WEEKS, Normal, Disp-2 Each, R-1      ONETOUCH ULTRA BLUE TEST STRIP strip CHECK BLOOD SUGAR THREE TIMES DAILY, Normal, Disp-100 Strip, R-11      Insulin Needles, Disposable, 30 gauge x 1/3\" USE TO INJECT LEVEMIR AND VICTOZA TWICE DAILY, Normal, Disp-1 Pen Needle, R-0      Blood-Glucose Meter monitoring kit Check sugars TID. E11.65. Once touch, Normal, Disp-1 Kit, R-0      Lancets misc Use as directed. Dx: e11.65 check sugars TID, Normal, Disp-1 Each, R-11           2. Follow-up Information     Follow up With Specialties Details Why Contact Info    [facility] EMERGENCY DEPT Emergency Medicine   [address] [facility]  [address] [facility]  [phone]      Call today Your dentist         Return to ED if worse     Diagnosis     Clinical Impression:   1.  Pain due to dental caries

## 2020-11-23 NOTE — PROGRESS NOTES
Patient contacted regarding recent discharge and COVID-19 risk. Discussed COVID-19 related testing which was not done at this time. Test results were not done. Patient informed of results, if available? no    Care Transition Nurse/ Ambulatory Care Manager/ LPN Care Coordinator contacted the patient by telephone to perform post discharge assessment. Verified name and  with patient as identifiers. Patient has following risk factors of: asthma and diabetes. CTN/ACM/LPN reviewed discharge instructions, medical action plan and red flags related to discharge diagnosis. Reviewed and educated them on any new and changed medications related to discharge diagnosis. Advised obtaining a 90-day supply of all daily and as-needed medications. Advance Care Planning:   Does patient have an Advance Directive: will address during future CCM call    Education provided regarding infection prevention, and signs and symptoms of COVID-19 and when to seek medical attention with patient who verbalized understanding. Discussed exposure protocols and quarantine from [address] you at higher risk for severe illness  and given an opportunity for questions and concerns. The patient agrees to contact the COVID-19 hotline [phone] or PCP office for questions related to their healthcare. CTN/ACM/LPN provided contact information for future reference. From CDC: Are you at higher risk for severe illness?  Wash your hands often.  Avoid close contact (6 feet, which is about two arm lengths) with people who are sick.  Put distance between yourself and other people if COVID-19 is spreading in your community.  Clean and disinfect frequently touched surfaces.  Avoid all cruise travel and non-essential air travel.  Call your healthcare professional if you have concerns about COVID-19 and your underlying condition or if you are sick.     For more information on steps you can take to protect yourself, see CDC's How to Protect Yourself      Patient/family/caregiver given information for [employer] and agrees to enroll no  Patient's preferred e-mail:  n/a  Patient's preferred phone number: n/a  Based on Loop alert triggers, patient will be contacted by nurse care manager for worsening symptoms. Plan for follow-up call in 7-14 days based on severity of symptoms and risk factors. Also enrolling patient in CCM services.

## 2020-11-23 NOTE — DISCHARGE INSTRUCTIONS
Patient Education     Dental Pain: After Your Visit  Your Care Instructions  The most common cause of dental pain is tooth decay. It can also be caused by an infection of the tooth (abscess) or gum, a tooth that has not broken all the way through the gum (impacted tooth), or a problem with the nerve-filled center of the tooth. Follow-up care is a key part of your treatment and safety. Be sure to make and go to all appointments, and call your doctor if you are having problems. Its also a good idea to know your test results and keep a list of the medicines you take. How can you care for yourself at home? · Contact a dentist for follow-up care. · Put ice or a cold pack on the outside of your mouth for 10 to 20 minutes at a time to reduce pain and swelling. Put a thin cloth between the ice and your skin. · Take an over-the-counter pain medicine, such as acetaminophen (Tylenol), ibuprofen (Advil, Motrin), or naproxen (Aleve). Read and follow all instructions on the label. · Do not take two or more pain medicines at the same time unless the doctor told you to. Many pain medicines have acetaminophen, which is Tylenol. Too much acetaminophen (Tylenol) can be harmful. · Rinse your mouth with warm salt water every 2 hours to help relieve pain and swelling from an infected tooth. Mix 1 teaspoon of salt in 8 ounces of water. · If your doctor prescribed antibiotics, take them as directed. Do not stop taking them just because you feel better. You need to take the full course of antibiotics. When should you call for help? Call your doctor now or seek immediate medical care if:  · You have signs of infection, such as:  ¨ Increased pain, swelling, warmth, or redness. ¨ Pus draining from the gum, tooth, or face. ¨ A fever. Watch closely for changes in your health, and be sure to contact your doctor if:  · You do not get better as expected. Where can you learn more?    Go to [employer]  Enter V264 in the search box to learn more about \"Dental Pain: After Your Visit. \"   © 8926-6730 Healthwise, [employer]. Care instructions adapted under license by [employer] (which disclaims liability or warranty for this information). This care instruction is for use with your licensed healthcare professional. If you have questions about a medical condition or this instruction, always ask your healthcare professional. [place] [address] any warranty or liability for your use of this information. Content Version: 00.3.942883;  Last Revised: May 17, 2013

## 2020-12-02 DIAGNOSIS — E11.65 TYPE 2 DIABETES MELLITUS WITH HYPERGLYCEMIA, WITH LONG-TERM CURRENT USE OF INSULIN (HCC): ICD-10-CM

## 2020-12-02 DIAGNOSIS — Z79.4 TYPE 2 DIABETES MELLITUS WITH HYPERGLYCEMIA, WITH LONG-TERM CURRENT USE OF INSULIN (HCC): ICD-10-CM

## 2020-12-02 RX ORDER — INSULIN DETEMIR 100 [IU]/ML
INJECTION, SOLUTION SUBCUTANEOUS
Qty: 15 ML | Refills: 0 | Status: SHIPPED | OUTPATIENT
Start: 2020-12-02 | End: 2021-01-04

## 2020-12-11 ENCOUNTER — PATIENT OUTREACH (OUTPATIENT)
Dept: CASE MANAGEMENT | Age: 50
End: 2020-12-11

## 2020-12-15 NOTE — PROGRESS NOTES
Ambulatory Care Management Note    Date/Time:  12/15/2020 11:10 AM    This Ambulatory Care Manager (ACM) reviewed and updated the following screenings during this call; medication reconciliation. Patient's challenges to self management identified:   lack of knowledge about disease and medication management      Medication Management:  poor adherence and poor understanding    Advance Care Planning:   Does patient have an Advance Directive:  not on file; will address during future outreach    [employer], Referrals, and Durable Medical Equipment: none      Health Maintenance Due   Topic Date Due    DTaP/Tdap/Td series (1 - Tdap) 05/06/1991    Shingrix Vaccine Age 50> (1 of 2) 05/06/2020    Lipid Screen  12/23/2020     Health Maintenance reviewed with patient    Patient was asked to consider health care goals that they would like to focus on with this ACM. ACM will follow up with patient to discuss goals and establish care plan in the next 7-14 days.        PCP/Specialist follow up:   Future Appointments   Date Time Provider Lee Ann Becerra   2/4/2021  8:30 AM Ruthann Hogue., KATIA [facility] BS AMB

## 2020-12-30 ENCOUNTER — PATIENT OUTREACH (OUTPATIENT)
Dept: CASE MANAGEMENT | Age: 50
End: 2020-12-30

## 2021-01-04 DIAGNOSIS — E11.65 TYPE 2 DIABETES MELLITUS WITH HYPERGLYCEMIA, WITH LONG-TERM CURRENT USE OF INSULIN (HCC): ICD-10-CM

## 2021-01-04 DIAGNOSIS — Z79.4 TYPE 2 DIABETES MELLITUS WITH HYPERGLYCEMIA, WITH LONG-TERM CURRENT USE OF INSULIN (HCC): ICD-10-CM

## 2021-01-04 RX ORDER — INSULIN DETEMIR 100 [IU]/ML
INJECTION, SOLUTION SUBCUTANEOUS
Qty: 15 ML | Refills: 0 | Status: SHIPPED | OUTPATIENT
Start: 2021-01-04 | End: 2021-02-04 | Stop reason: SDUPTHER

## 2021-01-05 RX ORDER — ALBUTEROL SULFATE 90 UG/1
AEROSOL, METERED RESPIRATORY (INHALATION)
Qty: 9 G | Refills: 0 | Status: SHIPPED | OUTPATIENT
Start: 2021-01-05 | End: 2021-02-04 | Stop reason: SDUPTHER

## 2021-01-25 ENCOUNTER — TELEPHONE (OUTPATIENT)
Dept: INTERNAL MEDICINE CLINIC | Age: 51
End: 2021-01-25

## 2021-01-25 RX ORDER — EMPAGLIFLOZIN 10 MG/1
TABLET, FILM COATED ORAL
Qty: 90 TAB | Refills: 0 | Status: SHIPPED | OUTPATIENT
Start: 2021-01-25 | End: 2021-02-04 | Stop reason: SDUPTHER

## 2021-01-25 NOTE — TELEPHONE ENCOUNTER
Pt states ins co changed his Proair inhaler to Ventolin and [facility] will not fill it until they hear from you.   Pt # [phone]

## 2021-01-26 ENCOUNTER — PATIENT MESSAGE (OUTPATIENT)
Dept: INTERNAL MEDICINE CLINIC | Age: 51
End: 2021-01-26

## 2021-01-27 ENCOUNTER — PATIENT OUTREACH (OUTPATIENT)
Dept: CASE MANAGEMENT | Age: 51
End: 2021-01-27

## 2021-01-27 NOTE — PROGRESS NOTES
Ambulatory Care Management Note      Date/Time:  1/27/2021 1:07 PM    This patient was received as a referral from daily discharge assignment   Top Challenges reviewed with the provider   No challenges reviewed at this time             Ambulatory  contacted patient for discussion and case management of diabetes   Summary of patients top problems:   1. Diabetes  2. BMI 43.51  3. HTN  Patient's challenges to self management identified:   no challenges identified      Medication Management:  good adherence    Advance Care Planning:   Does patient have an Advance Directive:  not on file, will address during future call    [employer], Referrals, and Durable Medical Equipment: none    PCP/Specialist follow up:   Future Appointments   Date Time Provider Lee Ann Becerra   2/4/2021  8:30 AM Sonam Hernandez., KATIA [facility] BS AMB          Goals      Skills and education necessary to properly manage diabetes      1/27/21 - A1c 7.8 at last appointment. Jardiance added. Per patient, he is compliant with all diabetes medications. Reports that diet could be improved. Self monitoring blood sugars and reports they are running \"120's. \" Has appointment with PCP on 2/4/20. Patient will continue taking medications as prescribed and have A1c check on 2/4. Will follow-up after 2/4 to further discuss diabetes management. Patient verbalized understanding of all information discussed. Patient has this Ambulatory Care Manager's contact information for any further questions, concerns, or needs.

## 2021-02-04 ENCOUNTER — HOSPITAL ENCOUNTER (OUTPATIENT)
Dept: GENERAL RADIOLOGY | Age: 51
Discharge: HOME | End: 2021-02-04
Payer: COMMERCIAL

## 2021-02-04 ENCOUNTER — OFFICE VISIT (OUTPATIENT)
Dept: INTERNAL MEDICINE CLINIC | Age: 51
End: 2021-02-04
Payer: COMMERCIAL

## 2021-02-04 VITALS
RESPIRATION RATE: 18 BRPM | BODY MASS INDEX: 39.17 KG/M2 | HEIGHT: 75 IN | DIASTOLIC BLOOD PRESSURE: 68 MMHG | HEART RATE: 94 BPM | WEIGHT: 315 LBS | TEMPERATURE: 98.3 F | OXYGEN SATURATION: 95 % | SYSTOLIC BLOOD PRESSURE: 114 MMHG

## 2021-02-04 DIAGNOSIS — Z00.00 ROUTINE PHYSICAL EXAMINATION: Primary | ICD-10-CM

## 2021-02-04 DIAGNOSIS — I10 ESSENTIAL HYPERTENSION: ICD-10-CM

## 2021-02-04 DIAGNOSIS — Z91.09 ENVIRONMENTAL ALLERGIES: ICD-10-CM

## 2021-02-04 DIAGNOSIS — E78.2 MIXED HYPERLIPIDEMIA: ICD-10-CM

## 2021-02-04 DIAGNOSIS — G89.29 CHRONIC PAIN OF RIGHT KNEE: ICD-10-CM

## 2021-02-04 DIAGNOSIS — R35.1 NOCTURIA: ICD-10-CM

## 2021-02-04 DIAGNOSIS — N52.9 ERECTILE DYSFUNCTION, UNSPECIFIED ERECTILE DYSFUNCTION TYPE: ICD-10-CM

## 2021-02-04 DIAGNOSIS — M25.561 CHRONIC PAIN OF RIGHT KNEE: ICD-10-CM

## 2021-02-04 DIAGNOSIS — Z79.4 TYPE 2 DIABETES MELLITUS WITH HYPERGLYCEMIA, WITH LONG-TERM CURRENT USE OF INSULIN (HCC): ICD-10-CM

## 2021-02-04 DIAGNOSIS — E11.65 TYPE 2 DIABETES MELLITUS WITH HYPERGLYCEMIA, WITH LONG-TERM CURRENT USE OF INSULIN (HCC): ICD-10-CM

## 2021-02-04 LAB
CHOLEST SERPL-MCNC: <100 MG/DL
GLUCOSE POC: 181 MG/DL
HBA1C MFR BLD HPLC: 7.2 %
HDLC SERPL-MCNC: 38 MG/DL
LDL CHOLESTEROL POC: NORMAL MG/DL
TCHOL/HDL RATIO (POC): NORMAL
TRIGL SERPL-MCNC: 108 MG/DL
VLDLC SERPL CALC-MCNC: NORMAL MG/DL

## 2021-02-04 PROCEDURE — 73562 X-RAY EXAM OF KNEE 3: CPT

## 2021-02-04 PROCEDURE — 83036 HEMOGLOBIN GLYCOSYLATED A1C: CPT | Performed by: NURSE PRACTITIONER

## 2021-02-04 PROCEDURE — 3051F HG A1C>EQUAL 7.0%<8.0%: CPT | Performed by: NURSE PRACTITIONER

## 2021-02-04 PROCEDURE — 82962 GLUCOSE BLOOD TEST: CPT | Performed by: NURSE PRACTITIONER

## 2021-02-04 PROCEDURE — 99396 PREV VISIT EST AGE 40-64: CPT | Performed by: NURSE PRACTITIONER

## 2021-02-04 PROCEDURE — 80061 LIPID PANEL: CPT | Performed by: NURSE PRACTITIONER

## 2021-02-04 RX ORDER — ROSUVASTATIN CALCIUM 40 MG/1
40 TABLET, COATED ORAL
Qty: 90 TAB | Refills: 1 | Status: SHIPPED | OUTPATIENT
Start: 2021-02-04 | End: 2021-07-20 | Stop reason: SDUPTHER

## 2021-02-04 RX ORDER — METFORMIN HYDROCHLORIDE 1000 MG/1
TABLET ORAL
Qty: 180 TAB | Refills: 1 | Status: SHIPPED | OUTPATIENT
Start: 2021-02-04 | End: 2021-07-20 | Stop reason: SDUPTHER

## 2021-02-04 RX ORDER — LIRAGLUTIDE 6 MG/ML
INJECTION SUBCUTANEOUS
Qty: 45 ML | Refills: 0 | Status: SHIPPED | OUTPATIENT
Start: 2021-02-04 | End: 2021-05-13

## 2021-02-04 RX ORDER — ASPIRIN 81 MG/1
81 TABLET ORAL DAILY
Qty: 90 TAB | Refills: 1 | Status: SHIPPED | OUTPATIENT
Start: 2021-02-04 | End: 2021-07-20 | Stop reason: SDUPTHER

## 2021-02-04 RX ORDER — ENALAPRIL MALEATE AND HYDROCHLOROTHIAZIDE 10; 25 MG/1; MG/1
1 TABLET ORAL DAILY
Qty: 90 TAB | Refills: 0 | Status: SHIPPED | OUTPATIENT
Start: 2021-02-04 | End: 2021-05-13

## 2021-02-04 RX ORDER — TADALAFIL 10 MG/1
10 TABLET ORAL
Qty: 20 TAB | Refills: 1 | Status: SHIPPED | OUTPATIENT
Start: 2021-02-04 | End: 2021-07-05

## 2021-02-04 RX ORDER — INSULIN DETEMIR 100 [IU]/ML
50 INJECTION, SOLUTION SUBCUTANEOUS
Qty: 45 ML | Refills: 0 | Status: SHIPPED | OUTPATIENT
Start: 2021-02-04 | End: 2021-05-13

## 2021-02-04 RX ORDER — ALBUTEROL SULFATE 90 UG/1
1 AEROSOL, METERED RESPIRATORY (INHALATION)
Qty: 1 INHALER | Refills: 1 | Status: SHIPPED | OUTPATIENT
Start: 2021-02-04 | End: 2021-05-13 | Stop reason: SDUPTHER

## 2021-02-04 RX ORDER — CETIRIZINE HCL 10 MG
TABLET ORAL
Qty: 90 TAB | Refills: 1 | Status: SHIPPED | OUTPATIENT
Start: 2021-02-04 | End: 2021-04-20 | Stop reason: ALTCHOICE

## 2021-02-04 NOTE — LETTER
NOTIFICATION RETURN TO WORK / SCHOOL 
 
2/4/2021 8:48 AM 
 
Mr. Randee Jimenez 
[address] [facility] [address] 
 
 
To Whom It May Concern: 
 
Randee Jimenez is currently under the care of [facility]. 
 
He will return to work/school on: 2/8/21. 
 
If there are questions or concerns please have the patient contact our office. 
 
 
 
Sincerely, 
 
 
Lesa Pena, NP

## 2021-02-04 NOTE — PROGRESS NOTES
Chief Complaint   Patient presents with   • Follow-up     3 month, DM, HTN    • Knee Pain     right knee, pt states knee is getting worse and swelling has returned, pt states he was unable to work yesterday    • Request For New Medication     Dexcom; pt states insurance will not cover pro air inhaler and will only cover Ventolin    • Medication Refill     pt needs 90 day script for home delivery        1. Have you been to the ER, urgent care clinic since your last visit?  Hospitalized since your last visit?No    2. Have you seen or consulted any other health care providers outside of the [facility] since your last visit?  Include any pap smears or colon screening. No

## 2021-02-04 NOTE — PROGRESS NOTES
Subjective: (As above and below)     Chief Complaint   Patient presents with    Follow-up     3 month, DM, HTN     Knee Pain     right knee, pt states knee is getting worse and swelling has returned, pt states he was unable to work yesterday     Request For New Medication     Dexcom; pt states insurance will not cover pro air inhaler and will only cover Ventolin     Medication Refill     pt needs 90 day script for home delivery      Leeann Segundo is a 48y.o. year old male who presents for well male and fu on chronic conditions    Diabetic Review of Systems - medication compliance: compliant all of the time, diabetic diet compliance: compliant most of the time, home glucose monitoring: is performed regularly. Hypertension ROS:  taking medications as instructed, no medication side effects noted, no TIAs, no chest pain on exertion, no dyspnea on exertion, no swelling of ankles    Hyperlipidemia: tolerating statin    Knee pain: acute on chronic, no knew injuries. Notes occ swelling, popping and pain, he works on concrete floors and his knee \"gave out\" the other day. He did not fall. Aleve and icy hot take the edge off but he is weary to take too many medications    Weight:  Wt Readings from Last 3 Encounters:   02/04/21 334 lb (151.5 kg)   11/22/20 348 lb 1.7 oz (157.9 kg)   10/29/20 342 lb (155.1 kg)     He has been trying to eat better and exercise! Eye exam; was rescheduled by the provider, he will work on this    Asthma: infrequent use of rescue inhaler, smokes cigars occasionalky    Reviewed PmHx, RxHx, FmHx, SocHx, AllgHx and updated in chart.   Family History   Problem Relation Age of Onset    Diabetes Mother         cancer, liver? hep c    Hypertension Mother     Hypertension Father     Diabetes Brother     Hypertension Brother     Hypertension Maternal Uncle     Diabetes Maternal Grandmother     Diabetes Paternal Grandmother     Hypertension Maternal Uncle     Hypertension Maternal Uncle     Diabetes Paternal Uncle        Past Medical History:   Diagnosis Date    Asthma     Diabetes ([facility] [address]     HTN (hypertension) 3/30/2010    Hypertension     Other and unspecified hyperlipidemia 3/30/2010    Sleep apnea     Type II or unspecified type diabetes mellitus without mention of complication, uncontrolled 3/30/2010      Social History     Socioeconomic History    Marital status: SINGLE     Spouse name: Not on file    Number of children: Not on file    Years of education: Not on file    Highest education level: Not on file   Tobacco Use    Smoking status: Current Some Day Smoker     Packs/day: 0.50     Years: 22.00     Pack years: 11.00     Types: Cigars    Smokeless tobacco: Never Used    Tobacco comment: black and mild   Substance and Sexual Activity    Alcohol use: Yes     Alcohol/week: 10.0 standard drinks     Types: 10 Standard drinks or equivalent per week     Comment: Socially     Drug use: No    Sexual activity: Yes     Partners: Female     Birth control/protection: Condom   Social History Narrative    6/6/17: works at [employer]          Current Outpatient Medications   Medication Sig    aspirin delayed-release 81 mg tablet Take 1 Tab by mouth daily.  cetirizine (ZYRTEC) 10 mg tablet TAKE 1 TABLET BY MOUTH ONCE DAILY AS NEEDED FOR ALLERGIES    enalapril-hydroCHLOROthiazide (VASERETIC) 10-25 mg tablet Take 1 Tab by mouth daily.  empagliflozin (Jardiance) 10 mg tablet Take 1 tablet by mouth once daily    insulin detemir U-100 (Levemir FlexTouch U-100 Insuln) 100 unit/mL (3 mL) inpn 50 Units by SubCUTAneous route nightly.  liraglutide (Victoza 3-Demar) 0.6 mg/0.1 mL (18 mg/3 mL) pnij ADMINISTER 1.8 MG UNDER THE SKIN DAILY    metFORMIN (GLUCOPHAGE) 1,000 mg tablet TAKE 1 TABLET BY MOUTH TWICE DAILY WITH MEALS FOR DIABETES( REPLACES JANUMET)    rosuvastatin (CRESTOR) 40 mg tablet Take 1 Tab by mouth nightly.     tadalafiL (CIALIS) 10 mg tablet Take 1 Tab by mouth daily as needed for Erectile Dysfunction. 30 minutes before intercourse    albuterol (PROVENTIL HFA, VENTOLIN HFA, PROAIR HFA) 90 mcg/actuation inhaler Take 1 Puff by inhalation every six (6) hours as needed for Wheezing.  BD Ultra-Fine Short Pen Needle 31 gauge x 5/16\" ndle USE TO INJECT LEVEMIR AND VICTOZA UNDER THE SKIN TWICE DAILY    FreeStyle Giovanni 14 Day Sensor kit USE TO CHECK BLOOD SUGAR AS DIRECTED    FREESTYLE GIOVANNI 14 DAY READER misc USE TO CHECK BLOOD SUGAR ONCE EVERY 2 WEEKS    ONETOUCH ULTRA BLUE TEST STRIP strip CHECK BLOOD SUGAR THREE TIMES DAILY    Insulin Needles, Disposable, 30 gauge x 1/3\" USE TO INJECT LEVEMIR AND VICTOZA TWICE DAILY    Blood-Glucose Meter monitoring kit Check sugars TID. E11.65. Once touch    Lancets misc Use as directed. Dx: e11.65 check sugars TID     No current facility-administered medications for this visit. Review of Systems:   Constitutional:    Negative for fever and chills, negative diaphoresis. HEENT:              Negative for neck pain and stiffness. Eyes:                  Negative for visual disturbance, itching, redness or discharge. Respiratory:        Negative for cough and shortness of breath. Cardiovascular:  Negative for chest pain and palpitations. Gastrointestinal: Negative for nausea, vomiting, abdominal pain, diarrhea or constipation. Genitourinary:     Negative for dysuria and frequency. +voids 2-3 x per night but does drink a lot of water  Musculoskeletal: Negative for falls, tenderness and swelling. Skin:                    Negative for rash, masses or lesions. Neurological:       Negative for dizzyness, seizure, loss of consciousness, weakness and numbness.      Objective:     Vitals:    02/04/21 0811 02/04/21 0859   BP: 114/68    Pulse: (!) 101 94   Resp: 18    Temp: 98.3 °F (36.8 °C)    TempSrc: Temporal    SpO2: 95%    Weight: 334 lb (151.5 kg)    Height: 6' 3\" (1.905 m)        Results for orders placed or performed in visit on 02/04/21 AMB POC HEMOGLOBIN A1C   Result Value Ref Range    Hemoglobin A1c (POC) 7.2 %   AMB POC LIPID PROFILE   Result Value Ref Range    Cholesterol (POC) <100     Triglycerides (POC) 108     HDL Cholesterol (POC) 38     VLDL (POC) n/a MG/DL    LDL Cholesterol (POC) n/a MG/DL    TChol/HDL Ratio (POC) n/a    AMB POC GLUCOSE BLOOD, BY GLUCOSE MONITORING DEVICE   Result Value Ref Range    Glucose  mg/dL       Gen: Oriented to person, place and time and well-developed, well-nourished and in no distress. HEENT:    Head: normocephalic and atraumatic. Eyes:  EOM are normal. Pupils equal and round. Neck:  Normal range of motion. Neck supple. Cardiovascular: normal rate, regular rhythm and normal heart sounds. Pulmonary/Chest:  Effort normal and breath sounds normal.  No respiratory distress. No wheezes, no rales. .   Musculoskeletal:  No edema, no tenderness. No calf tenderness or edema. R knee: no swelling, warmth, redness, TTP to lateral joint line. +crepitus  Neurological:  Alert, oriented to person, place and time. Skin: skin is warm and dry. Assessment/ Plan:     Follow-up and Dispositions    · Return in about 3 months (around 5/4/2021). 1. Routine physical examination      2. Type 2 diabetes mellitus with hyperglycemia, with long-term current use of insulin ([address]  Improving! R/s eye exam    - AMB POC HEMOGLOBIN A1C  - AMB POC LIPID PROFILE  - AMB POC GLUCOSE BLOOD, BY GLUCOSE MONITORING DEVICE  - insulin detemir U-100 (Levemir FlexTouch U-100 Insuln) 100 unit/mL (3 mL) inpn; 50 Units by SubCUTAneous route nightly. Dispense: 45 mL; Refill: 0  - liraglutide (Victoza 3-Demar) 0.6 mg/0.1 mL (18 mg/3 mL) pnij; ADMINISTER 1.8 MG UNDER THE SKIN DAILY  Dispense: 45 mL; Refill: 0  - metFORMIN (GLUCOPHAGE) 1,000 mg tablet; TAKE 1 TABLET BY MOUTH TWICE DAILY WITH MEALS FOR DIABETES( REPLACES JANUMET)  Dispense: 180 Tab; Refill: 1    3.  Mixed hyperlipidemia    - aspirin delayed-release 81 mg tablet; Take 1 Tab by mouth daily. Dispense: 90 Tab; Refill: 1  - rosuvastatin (CRESTOR) 40 mg tablet; Take 1 Tab by mouth nightly. Dispense: 90 Tab; Refill: 1  - LIPID PANEL    4. Environmental allergies    - cetirizine (ZYRTEC) 10 mg tablet; TAKE 1 TABLET BY MOUTH ONCE DAILY AS NEEDED FOR ALLERGIES  Dispense: 90 Tab; Refill: 1    5. Essential hypertension  Dose reduction, cont diet/weight loss, recc home checks  - enalapril-hydroCHLOROthiazide (VASERETIC) 10-25 mg tablet; Take 1 Tab by mouth daily. Dispense: 90 Tab; Refill: 0    6. Erectile dysfunction, unspecified erectile dysfunction type    - tadalafiL (CIALIS) 10 mg tablet; Take 1 Tab by mouth daily as needed for Erectile Dysfunction. 30 minutes before intercourse  Dispense: 20 Tab; Refill: 1    7. Chronic pain of right knee  Will eval renal fx and rx nsaids as appropriate, provided stretches,  Out of work 2/2/21- can return 2/8/21  - XR KNEE RT MAX 2 VWS; Future    8. Nocturia    - PSA W/ REFLX FREE PSA        I have discussed the diagnosis with the patient and the intended plan as seen in the above orders. The patient has received an after-visit summary and questions were answered concerning future plans. Pt conveyed understanding of plan. Medication Side Effects and Warnings were discussed with patient: yes  Patient Labs were reviewed: yes  Patient Past Records were reviewed:  yes    Alice Fonseac.  Wil Bates NP

## 2021-02-04 NOTE — PATIENT INSTRUCTIONS
Knee: Exercises Introduction Here are some examples of exercises for you to try. The exercises may be suggested for a condition or for rehabilitation. Start each exercise slowly. Ease off the exercises if you start to have pain. You will be told when to start these exercises and which ones will work best for you. How to do the exercises [facility] 1. Sit with your leg straight and supported on the floor or a firm bed. (If you feel discomfort in the front or back of your knee, place a small towel roll under your knee.) 2. Tighten the muscles on top of your thigh by pressing the back of your knee flat down to the floor. (If you feel discomfort under your kneecap, place a small towel roll under your knee.) 3. Hold for about 6 seconds, then rest for up to 10 seconds. 4. Do 8 to 12 repetitions several times a day. Straight-leg raises to the front 1. Lie on your back with your good knee bent so that your foot rests flat on the floor. Your injured leg should be straight. Make sure that your low back has a normal curve. You should be able to slip your flat hand in between the floor and the small of your back, with your palm touching the floor and your back touching the back of your hand. 2. Tighten the thigh muscles in the injured leg by pressing the back of your knee flat down to the floor. Hold your knee straight. 3. Keeping the thigh muscles tight, lift your injured leg up so that your heel is about 12 inches off the floor. Hold for about 6 seconds and then lower slowly. 4. Do 8 to 12 repetitions, 3 times a day. Straight-leg raises to the outside 1. Lie on your side, with your injured leg on top. 2. Tighten the front thigh muscles of your injured leg to keep your knee straight. 3. Keep your hip and your leg straight in line with the rest of your body, and keep your knee pointing forward. Do not drop your hip back. 4. Lift your injured leg straight up toward the ceiling, about 12 inches off the floor. Hold for about 6 seconds, then slowly lower your leg. 5. Do 8 to 12 repetitions. Straight-leg raises to the back 1. Lie on your stomach, and lift your leg straight up behind you (toward the ceiling). 2. Lift your toes about 6 inches off the floor, hold for about 6 seconds, then lower slowly. 3. Do 8 to 12 repetitions. Straight-leg raises to the inside 1. Lie on the side of your body with the injured leg. 2. You can either prop your other (good) leg up on a chair, or you can bend your good knee and put that foot in front of your injured knee. Do not drop your hip back. 3. Tighten the muscles on the front of your thigh to straighten your injured knee. 4. Keep your kneecap pointing forward, and lift your whole leg up toward the ceiling about 6 inches. Hold for about 6 seconds, then lower slowly. 5. Do 8 to 12 repetitions. Heel dig bridging 1. Lie on your back with both knees bent and your ankles bent so that only your heels are digging into the floor. Your knees should be bent about 90 degrees. 2. Then push your heels into the floor, squeeze your buttocks, and lift your hips off the floor until your shoulders, hips, and knees are all in a straight line. 3. Hold for about 6 seconds as you continue to breathe normally, and then slowly lower your hips back down to the floor and rest for up to 10 seconds. 4. Do 8 to 12 repetitions. Hamstring curls 1. Lie on your stomach with your knees straight. If your kneecap is uncomfortable, roll up a washcloth and put it under your leg just above your kneecap. 2. Lift the foot of your injured leg by bending the knee so that you bring the foot up toward your buttock. If this motion hurts, try it without bending your knee quite as far. This may help you avoid any painful motion. 3. Slowly lower your leg back to the floor. 4. Do 8 to 12 repetitions. 5. With permission from your doctor or physical therapist, you may also want to add a cuff weight to your ankle (not more than 5 pounds). With weight, you do not have to lift your leg more than 12 inches to get a hamstring workout. Shallow standing knee bends Do this exercise only if you have very little pain; if you have no clicking, locking, or giving way if you have an injured knee; and if it does not hurt while you are doing 8 to 12 repetitions. 1. Stand with your hands lightly resting on a counter or chair in front of you. Put your feet shoulder-width apart. 2. Slowly bend your knees so that you squat down like you are going to sit in a chair. Make sure your knees do not go in front of your toes. 3. Lower yourself about 6 inches. Your heels should remain on the floor at all times. 4. Rise slowly to a standing position. Heel raises 1. Stand with your feet a few inches apart, with your hands lightly resting on a counter or chair in front of you. 2. Slowly raise your heels off the floor while keeping your knees straight. 3. Hold for about 6 seconds, then slowly lower your heels to the floor. 4. Do 8 to 12 repetitions several times during the day. Follow-up care is a key part of your treatment and safety. Be sure to make and go to all appointments, and call your doctor if you are having problems. It's also a good idea to know your test results and keep a list of the medicines you take. Where can you learn more? Go to http://www.gray.com/ Enter S517 in the search box to learn more about \"Knee: Exercises. \" Current as of: March 2, 2020               Content Version: 12.6 © 8534-5203 [employer]. Care instructions adapted under license by [employer] (which disclaims liability or warranty for this information). If you have questions about a medical condition or this instruction, always ask your healthcare professional. [address] any warranty or liability for your use of this information.

## 2021-02-05 LAB
BUN SERPL-MCNC: 18 MG/DL (ref 6–24)
BUN/CREAT SERPL: 17 (ref 9–20)
CALCIUM SERPL-MCNC: 10.1 MG/DL (ref 8.7–10.2)
CHLORIDE SERPL-SCNC: 100 MMOL/L (ref 96–106)
CHOLEST SERPL-MCNC: 99 MG/DL (ref 100–199)
CO2 SERPL-SCNC: 25 MMOL/L (ref 20–29)
CREAT SERPL-MCNC: 1.03 MG/DL (ref 0.76–1.27)
ERYTHROCYTE [DISTWIDTH] IN BLOOD BY AUTOMATED COUNT: 15 % (ref 11.6–15.4)
GLUCOSE SERPL-MCNC: 138 MG/DL (ref 65–99)
HCT VFR BLD AUTO: 40.6 % (ref 37.5–51)
HDLC SERPL-MCNC: 46 MG/DL
HGB BLD-MCNC: 13.3 G/DL (ref 13–17.7)
INTERPRETATION, 910389: NORMAL
LDLC SERPL CALC-MCNC: 39 MG/DL (ref 0–99)
Lab: NORMAL
MCH RBC QN AUTO: 24.9 PG (ref 26.6–33)
MCHC RBC AUTO-ENTMCNC: 32.8 G/DL (ref 31.5–35.7)
MCV RBC AUTO: 76 FL (ref 79–97)
PLATELET # BLD AUTO: 259 X10E3/UL (ref 150–450)
POTASSIUM SERPL-SCNC: 4.2 MMOL/L (ref 3.5–5.2)
PSA SERPL-MCNC: 0.8 NG/ML (ref 0–4)
RBC # BLD AUTO: 5.35 X10E6/UL (ref 4.14–5.8)
REFLEX CRITERIA: NORMAL
SODIUM SERPL-SCNC: 141 MMOL/L (ref 134–144)
TRIGL SERPL-MCNC: 64 MG/DL (ref 0–149)
VLDLC SERPL CALC-MCNC: 14 MG/DL (ref 5–40)
WBC # BLD AUTO: 10 X10E3/UL (ref 3.4–10.8)

## 2021-02-22 ENCOUNTER — PATIENT OUTREACH (OUTPATIENT)
Dept: CASE MANAGEMENT | Age: 51
End: 2021-02-22

## 2021-02-22 DIAGNOSIS — Z71.89 ADVANCE CARE PLANNING: Primary | ICD-10-CM

## 2021-02-24 NOTE — PROGRESS NOTES
Goals Addressed                 This Visit's Progress     completes advanced care plan        2/23/21 - Discussed advanced care planning. Patient interested in completing medical directive. ACP referral sent.  COMPLETED: Skills and education necessary to properly manage diabetes        1/27/21 - A1c 7.8 at last appointment. Jardiance added. Per patient, he is compliant with all diabetes medications. Reports that diet could be improved. Self monitoring blood sugars and reports they are running \"120's. \" Has appointment with PCP on 2/4/20. Patient will continue taking medications as prescribed and have A1c check on 2/4. Will follow-up after 2/4 to further discuss diabetes management. 2/23/21 - Patient attended PCP appointment. Labs showed significant improvement in blood sugars and cholesterol. BP doing well. Patient had no questions or concerns at this time. Plans to continue diet and exercise. Reports 10 pound weight loss. Taking medications as prescribed. Feels confident with self-management.

## 2021-02-24 NOTE — ACP (ADVANCE CARE PLANNING)
2/23/21 - Discussed advanced care planning. Patient interested in completing medical directive. ACP referral sent.

## 2021-02-25 ENCOUNTER — TELEPHONE (OUTPATIENT)
Dept: CASE MANAGEMENT | Age: 51
End: 2021-02-25

## 2021-03-05 ENCOUNTER — TELEPHONE (OUTPATIENT)
Dept: CASE MANAGEMENT | Age: 51
End: 2021-03-05

## 2021-03-05 NOTE — TELEPHONE ENCOUNTER
Spoke with pt this am. He had not looked in his email for the information. He requested I mail him the materials to review. with pt's knowledge, I will send and plan to call 7-10 days if no response.   Sue Arroyo, LUIS

## 2021-03-12 ENCOUNTER — TELEPHONE (OUTPATIENT)
Dept: CASE MANAGEMENT | Age: 51
End: 2021-03-12

## 2021-03-12 NOTE — TELEPHONE ENCOUNTER
I sent a MY CHART pt message today to pt. Will call again in 1 week to see if he wishes to schedule an appt.   Sherri Pratt, LUIS

## 2021-03-25 ENCOUNTER — TELEPHONE (OUTPATIENT)
Dept: CASE MANAGEMENT | Age: 51
End: 2021-03-25

## 2021-03-25 NOTE — ACP (ADVANCE CARE PLANNING)
ACP referral was received on 2/25/2021. I spoke with pt and emailed ACP materials for review. On 3/5/2021, I spoke again with him at which time he requested I mail info via post office. I sent a Pt message via My Chart message system. I attempted to call today> I left my contact information on his voice mail. I will close referral at this time but be available if/when he calls to schedule an ACP appt.   Lencho Saucedo LCSW

## 2021-03-30 ENCOUNTER — PATIENT OUTREACH (OUTPATIENT)
Dept: CASE MANAGEMENT | Age: 51
End: 2021-03-30

## 2021-03-31 NOTE — PROGRESS NOTES
Patient has graduated from the Complex Case Management  program on 3/31/21. Patient/family has the ability to self-manage at this time Care management goals have been completed. No further Ambulatory Care Manager follow up scheduled. Goals Addressed                 This Visit's Progress     COMPLETED: completes advanced care plan        2/23/21 - Discussed advanced care planning. Patient interested in completing medical directive. ACP referral sent. 3/31/21 Steph Davis received referral and has attempted patient contact. Patient has Ambulatory Care Manager's contact information for any further questions, concerns, or needs.   Patients upcoming visits:    Future Appointments   Date Time Provider Lee Ann Becerra   5/6/2021  8:30 AM Anna Tobin., KATIA [facility] BS AMB

## 2021-04-13 ENCOUNTER — HOSPITAL ENCOUNTER (EMERGENCY)
Age: 51
Discharge: HOME | End: 2021-04-13
Attending: EMERGENCY MEDICINE
Payer: COMMERCIAL

## 2021-04-13 ENCOUNTER — APPOINTMENT (OUTPATIENT)
Dept: MRI IMAGING | Age: 51
End: 2021-04-13
Attending: EMERGENCY MEDICINE
Payer: COMMERCIAL

## 2021-04-13 VITALS
HEIGHT: 75 IN | OXYGEN SATURATION: 99 % | HEART RATE: 96 BPM | BODY MASS INDEX: 39.17 KG/M2 | SYSTOLIC BLOOD PRESSURE: 149 MMHG | WEIGHT: 315 LBS | RESPIRATION RATE: 18 BRPM | DIASTOLIC BLOOD PRESSURE: 87 MMHG | TEMPERATURE: 98.5 F

## 2021-04-13 DIAGNOSIS — R73.9 HYPERGLYCEMIA: ICD-10-CM

## 2021-04-13 DIAGNOSIS — R00.2 PALPITATIONS: ICD-10-CM

## 2021-04-13 DIAGNOSIS — G58.8 OTHER MONONEUROPATHY: Primary | ICD-10-CM

## 2021-04-13 LAB
ALBUMIN SERPL-MCNC: 3.7 G/DL (ref 3.5–5)
ALBUMIN/GLOB SERPL: 1 {RATIO} (ref 1.1–2.2)
ALP SERPL-CCNC: 73 U/L (ref 45–117)
ALT SERPL-CCNC: 22 U/L (ref 12–78)
ANION GAP SERPL CALC-SCNC: 2 MMOL/L (ref 5–15)
AST SERPL-CCNC: 16 U/L (ref 15–37)
ATRIAL RATE: 95 BPM
BASOPHILS # BLD: 0.1 K/UL (ref 0–0.1)
BASOPHILS NFR BLD: 1 % (ref 0–1)
BILIRUB SERPL-MCNC: 0.6 MG/DL (ref 0.2–1)
BUN SERPL-MCNC: 16 MG/DL (ref 6–20)
BUN/CREAT SERPL: 13 (ref 12–20)
CALCIUM SERPL-MCNC: 9.7 MG/DL (ref 8.5–10.1)
CALCULATED P AXIS, ECG09: 52 DEGREES
CALCULATED R AXIS, ECG10: 59 DEGREES
CALCULATED T AXIS, ECG11: 45 DEGREES
CHLORIDE SERPL-SCNC: 107 MMOL/L (ref 97–108)
CO2 SERPL-SCNC: 29 MMOL/L (ref 21–32)
CREAT SERPL-MCNC: 1.2 MG/DL (ref 0.7–1.3)
DIAGNOSIS, 93000: NORMAL
DIFFERENTIAL METHOD BLD: ABNORMAL
EOSINOPHIL # BLD: 0.3 K/UL (ref 0–0.4)
EOSINOPHIL NFR BLD: 3 % (ref 0–7)
ERYTHROCYTE [DISTWIDTH] IN BLOOD BY AUTOMATED COUNT: 16 % (ref 11.5–14.5)
GLOBULIN SER CALC-MCNC: 3.6 G/DL (ref 2–4)
GLUCOSE SERPL-MCNC: 170 MG/DL (ref 65–100)
HCT VFR BLD AUTO: 44.5 % (ref 36.6–50.3)
HGB BLD-MCNC: 13.9 G/DL (ref 12.1–17)
IMM GRANULOCYTES # BLD AUTO: 0.1 K/UL (ref 0–0.04)
IMM GRANULOCYTES NFR BLD AUTO: 1 % (ref 0–0.5)
LYMPHOCYTES # BLD: 2.6 K/UL (ref 0.8–3.5)
LYMPHOCYTES NFR BLD: 25 % (ref 12–49)
MCH RBC QN AUTO: 24.3 PG (ref 26–34)
MCHC RBC AUTO-ENTMCNC: 31.2 G/DL (ref 30–36.5)
MCV RBC AUTO: 77.7 FL (ref 80–99)
MONOCYTES # BLD: 0.8 K/UL (ref 0–1)
MONOCYTES NFR BLD: 8 % (ref 5–13)
NEUTS SEG # BLD: 6.4 K/UL (ref 1.8–8)
NEUTS SEG NFR BLD: 62 % (ref 32–75)
NRBC # BLD: 0 K/UL (ref 0–0.01)
NRBC BLD-RTO: 0 PER 100 WBC
P-R INTERVAL, ECG05: 188 MS
PLATELET # BLD AUTO: 254 K/UL (ref 150–400)
PMV BLD AUTO: 10.3 FL (ref 8.9–12.9)
POTASSIUM SERPL-SCNC: 3.9 MMOL/L (ref 3.5–5.1)
PROT SERPL-MCNC: 7.3 G/DL (ref 6.4–8.2)
Q-T INTERVAL, ECG07: 292 MS
QRS DURATION, ECG06: 72 MS
QTC CALCULATION (BEZET), ECG08: 366 MS
RBC # BLD AUTO: 5.73 M/UL (ref 4.1–5.7)
SODIUM SERPL-SCNC: 138 MMOL/L (ref 136–145)
TROPONIN I SERPL-MCNC: <0.05 NG/ML
VENTRICULAR RATE, ECG03: 95 BPM
WBC # BLD AUTO: 10.2 K/UL (ref 4.1–11.1)

## 2021-04-13 PROCEDURE — 93005 ELECTROCARDIOGRAM TRACING: CPT

## 2021-04-13 PROCEDURE — 80053 COMPREHEN METABOLIC PANEL: CPT

## 2021-04-13 PROCEDURE — 36415 COLL VENOUS BLD VENIPUNCTURE: CPT

## 2021-04-13 PROCEDURE — 85025 COMPLETE CBC W/AUTO DIFF WBC: CPT

## 2021-04-13 PROCEDURE — 70551 MRI BRAIN STEM W/O DYE: CPT

## 2021-04-13 PROCEDURE — 84484 ASSAY OF TROPONIN QUANT: CPT

## 2021-04-13 PROCEDURE — 99285 EMERGENCY DEPT VISIT HI MDM: CPT

## 2021-04-13 RX ORDER — NAPROXEN 500 MG/1
500 TABLET ORAL 2 TIMES DAILY WITH MEALS
Qty: 20 TAB | Refills: 0 | Status: SHIPPED | OUTPATIENT
Start: 2021-04-13 | End: 2021-04-23

## 2021-04-13 NOTE — ED NOTES
Dr. Arie Keene reviewed discharge instructions with the patient. The patient verbalized understanding. All questions and concerns were addressed. The patient declined a wheelchair and is discharged ambulatory in the care of family members with instructions and prescriptions in hand. Pt is alert and oriented x 4. Respirations are clear and unlabored.

## 2021-04-13 NOTE — DISCHARGE INSTRUCTIONS
It was a pleasure taking care of you in our Emergency Department today. We know that when you come to [facility], you are entrusting us with your health, comfort, and safety. Our physicians and nurses honor that trust, and truly appreciate the opportunity to care for you and your loved ones. We also value your feedback. If you receive a survey about your Emergency Department experience today, please fill it out. We care about our patients' feedback, and we listen to what you have to say. Thank you!

## 2021-04-13 NOTE — ED NOTES
Pt reports that for the last week he gets some tingling and weakness in his R leg that dissipates if he sits down, Reports for the first time while in the waiting room he had the sensation in his L leg.

## 2021-04-14 ENCOUNTER — PATIENT OUTREACH (OUTPATIENT)
Dept: CASE MANAGEMENT | Age: 51
End: 2021-04-14

## 2021-04-14 NOTE — PROGRESS NOTES
Patient contacted regarding recent discharge and COVID-19 risk. Discussed COVID-19 related testing which was not done at this time. Test results were not done. Patient informed of results, if available? N/A    Outreach made within 2 business days of discharge: Yes    Care Transition Nurse/ Ambulatory Care Manager/ LPN Care Coordinator contacted the patient by telephone to perform post discharge assessment. Verified name and  with patient as identifiers. Patient has following risk factors of: asthma and diabetes. CTN/ACM/LPN reviewed discharge instructions, medical action plan and red flags related to discharge diagnosis. Reviewed and educated them on any new and changed medications related to discharge diagnosis; Rx - naproxen. Advised obtaining a 90-day supply of all daily and as-needed medications. Advance Care Planning:   Does patient have an Advance Directive: patient was previously referred to [address] [place] [address] by Kaylyn Coelho RN. Patient reports that he has completed Advance Medical Directive, however has not signed it yet due to signature requiring a witness. Patient is encouraged to provide his PCP office with a copy of his completed/signed Advance Medical Directive for his EMR. Patient declines education regarding infection prevention, and signs and symptoms of COVID-19 and when to seek medical attention. Patient reports that he has received his first COVID-19 vaccination; second dose is scheduled. Pt was advised to f/u with PCP Jennifer Cook, KATIA) and Dr. Cyndie Avila (Cardiology) post-discharge. Patient reports that he contacted Cardiology this morning and scheduled an appointment. Future Appointments:  Future Appointments   Date Time Provider Lee Ann Becerra   2021  1:45 PM KATIA Mccarthy   2021  8:30 AM KATIA Mccarthy          Plan for follow-up call in 7-14 days based on severity of symptoms and risk factors.

## 2021-04-14 NOTE — ACP (ADVANCE CARE PLANNING)
Does patient have an Advance Directive: patient was previously referred to [address] [place] [address] by Shaq Velazco RN. Patient reports that he has completed Advance Medical Directive, however has not signed it yet due to signature requiring a witness. Patient is encouraged to provide his PCP office with a copy of his completed/signed Advance Medical Directive for his EMR.

## 2021-04-15 NOTE — ED PROVIDER NOTES
EMERGENCY DEPARTMENT HISTORY AND PHYSICAL EXAM      Date: 4/13/2021  Patient Name: Marco Gracia    History of Presenting Illness     Chief Complaint   Patient presents with    Extremity Weakness     right leg weakness x 1 week with tingling pt states his knees just give out. History Provided By: Patient    HPI: Marco Garcia, 48 y.o. male with a past medical history significant for Diabetes, asthma, hypertension, medical problems as stated below presents to the ED with cc of unexplained right lower extremity weakness, back discomfort over the last week. Patient reports intermittently his right leg is just \"giving out\". He reports that he has had some associated back pain with this as well and possibly some radicular pain at times. He reports this is made it very anxious because he is almost fallen several times. In between episodes he appears to be walking normally with normal strength and sensation. He does report some episodes of anxiety associated with severe palpitations and shortness of breath. Given that he has had several episodes throughout the week he wanted to come in and \"be checked out\". He is concerned about possible problems with his heart and also with his blood glucose. He does report that he has not been entirely compliant with his insulin and diabetic regimes. He reports he does not check his blood sugars regularly and is concerned his blood sugars could be high. He denies any other associated symptoms. No other exacerbating ameliorating factors. There are no other complaints, changes, or physical findings at this time. PCP: Yannick Burkett NP    No current facility-administered medications on file prior to encounter. Current Outpatient Medications on File Prior to Encounter   Medication Sig Dispense Refill    aspirin delayed-release 81 mg tablet Take 1 Tab by mouth daily.  90 Tab 1    cetirizine (ZYRTEC) 10 mg tablet TAKE 1 TABLET BY MOUTH ONCE DAILY AS NEEDED FOR ALLERGIES 90 Tab 1    enalapril-hydroCHLOROthiazide (VASERETIC) 10-25 mg tablet Take 1 Tab by mouth daily. 90 Tab 0    empagliflozin (Jardiance) 10 mg tablet Take 1 tablet by mouth once daily 90 Tab 0    insulin detemir U-100 (Levemir FlexTouch U-100 Insuln) 100 unit/mL (3 mL) inpn 50 Units by SubCUTAneous route nightly. 45 mL 0    liraglutide (Victoza 3-Demar) 0.6 mg/0.1 mL (18 mg/3 mL) pnij ADMINISTER 1.8 MG UNDER THE SKIN DAILY 45 mL 0    metFORMIN (GLUCOPHAGE) 1,000 mg tablet TAKE 1 TABLET BY MOUTH TWICE DAILY WITH MEALS FOR DIABETES( REPLACES JANUMET) 180 Tab 1    rosuvastatin (CRESTOR) 40 mg tablet Take 1 Tab by mouth nightly. 90 Tab 1    tadalafiL (CIALIS) 10 mg tablet Take 1 Tab by mouth daily as needed for Erectile Dysfunction. 30 minutes before intercourse 20 Tab 1    albuterol (PROVENTIL HFA, VENTOLIN HFA, PROAIR HFA) 90 mcg/actuation inhaler Take 1 Puff by inhalation every six (6) hours as needed for Wheezing. 1 Inhaler 1    BD Ultra-Fine Short Pen Needle 31 gauge x 5/16\" ndle USE TO INJECT LEVEMIR AND VICTOZA UNDER THE SKIN TWICE DAILY 1 Package 11    FreeStyle Giovanni 14 Day Sensor kit USE TO CHECK BLOOD SUGAR AS DIRECTED 15 Kit 0    FREESTYLE GIOVANNI 14 DAY READER misc USE TO CHECK BLOOD SUGAR ONCE EVERY 2 WEEKS 2 Each 1    ONETOUCH ULTRA BLUE TEST STRIP strip CHECK BLOOD SUGAR THREE TIMES DAILY 100 Strip 11    Insulin Needles, Disposable, 30 gauge x 1/3\" USE TO INJECT LEVEMIR AND VICTOZA TWICE DAILY 1 Pen Needle 0    Blood-Glucose Meter monitoring kit Check sugars TID. E11.65. Once touch 1 Kit 0    Lancets misc Use as directed.  Dx: e11.65 check sugars TID 1 Each 11       Past History     Past Medical History:  Past Medical History:   Diagnosis Date    Asthma     Diabetes ([facility] [address]     HTN (hypertension) 3/30/2010    Hypertension     Other and unspecified hyperlipidemia 3/30/2010    Sleep apnea     Type II or unspecified type diabetes mellitus without mention of complication, uncontrolled 3/30/2010       Past Surgical History:  Past Surgical History:   Procedure Laterality Date    IN ABDOMEN SURGERY PROC UNLISTED      hernia repair       Family History:  Family History   Problem Relation Age of Onset    Diabetes Mother         cancer, liver? hep c    Hypertension Mother     Hypertension Father     Diabetes Brother     Hypertension Brother     Hypertension Maternal Uncle     Diabetes Maternal Grandmother     Diabetes Paternal Grandmother     Hypertension Maternal Uncle     Hypertension Maternal Uncle     Diabetes Paternal Uncle        Social History:  Social History     Tobacco Use    Smoking status: Current Some Day Smoker     Packs/day: 0.50     Years: 22.00     Pack years: 11.00     Types: Cigars    Smokeless tobacco: Never Used    Tobacco comment: black and mild   Substance Use Topics    Alcohol use: Yes     Alcohol/week: 10.0 standard drinks     Types: 10 Standard drinks or equivalent per week     Comment: Socially     Drug use: No       Allergies: Allergies   Allergen Reactions    Diazepam Other (comments)     Caused hallucinations, paranoia    Shellfish Derived Angioedema         Review of Systems   Review of Systems   Constitutional: Negative for chills, diaphoresis, fatigue and fever. HENT: Negative for ear pain and sore throat. Eyes: Negative for pain and redness. Respiratory: Negative for cough and shortness of breath. Cardiovascular: Positive for chest pain and palpitations. Negative for leg swelling. Gastrointestinal: Negative for abdominal pain, diarrhea, nausea and vomiting. Endocrine: Negative for cold intolerance and heat intolerance. Genitourinary: Negative for flank pain and hematuria. Musculoskeletal: Negative for back pain and neck stiffness. Skin: Negative for rash and wound. Neurological: Positive for weakness and numbness. Negative for dizziness, syncope and headaches.    All other systems reviewed and are negative. Physical Exam   Physical Exam  Vitals signs and nursing note reviewed. Constitutional:       Appearance: He is well-developed. HENT:      Head: Normocephalic and atraumatic. Mouth/Throat:      Pharynx: No oropharyngeal exudate. Eyes:      Conjunctiva/sclera: Conjunctivae normal.      Pupils: Pupils are equal, round, and reactive to light. Neck:      Musculoskeletal: Normal range of motion. Cardiovascular:      Rate and Rhythm: Normal rate and regular rhythm. Heart sounds: No murmur. Pulmonary:      Effort: Pulmonary effort is normal. No respiratory distress. Breath sounds: Normal breath sounds. No wheezing. Abdominal:      General: Bowel sounds are normal. There is no distension. Palpations: Abdomen is soft. Tenderness: There is no abdominal tenderness. Musculoskeletal: Normal range of motion. General: No deformity. Skin:     General: Skin is warm and dry. Findings: No rash. Neurological:      Mental Status: He is alert and oriented to person, place, and time. GCS: GCS eye subscore is 4. GCS verbal subscore is 5. GCS motor subscore is 6. Cranial Nerves: Cranial nerves are intact. Sensory: Sensation is intact. Motor: Motor function is intact. Coordination: Coordination is intact. Coordination normal.      Gait: Gait is intact. Psychiatric:         Behavior: Behavior normal.         Diagnostic Study Results     Labs -   No results found for this or any previous visit (from the past 24 hour(s)).   Recent Results (from the past 168 hour(s))   CBC WITH AUTOMATED DIFF    Collection Time: 04/13/21  1:30 PM   Result Value Ref Range    WBC 10.2 4.1 - 11.1 K/uL    RBC 5.73 (H) 4.10 - 5.70 M/uL    HGB 13.9 12.1 - 17.0 g/dL    HCT 44.5 36.6 - 50.3 %    MCV 77.7 (L) 80.0 - 99.0 FL    MCH 24.3 (L) 26.0 - 34.0 PG    MCHC 31.2 30.0 - 36.5 g/dL    RDW 16.0 (H) 11.5 - 14.5 %    PLATELET 221 475 - 954 K/uL    MPV 10.3 8.9 - 12.9 FL NRBC 0.0 0  WBC    ABSOLUTE NRBC 0.00 0.00 - 0.01 K/uL    NEUTROPHILS 62 32 - 75 %    LYMPHOCYTES 25 12 - 49 %    MONOCYTES 8 5 - 13 %    EOSINOPHILS 3 0 - 7 %    BASOPHILS 1 0 - 1 %    IMMATURE GRANULOCYTES 1 (H) 0.0 - 0.5 %    ABS. NEUTROPHILS 6.4 1.8 - 8.0 K/UL    ABS. LYMPHOCYTES 2.6 0.8 - 3.5 K/UL    ABS. MONOCYTES 0.8 0.0 - 1.0 K/UL    ABS. EOSINOPHILS 0.3 0.0 - 0.4 K/UL    ABS. BASOPHILS 0.1 0.0 - 0.1 K/UL    ABS. IMM. GRANS. 0.1 (H) 0.00 - 0.04 K/UL    DF AUTOMATED     METABOLIC PANEL, COMPREHENSIVE    Collection Time: 04/13/21  1:30 PM   Result Value Ref Range    Sodium 138 136 - 145 mmol/L    Potassium 3.9 3.5 - 5.1 mmol/L    Chloride 107 97 - 108 mmol/L    CO2 29 21 - 32 mmol/L    Anion gap 2 (L) 5 - 15 mmol/L    Glucose 170 (H) 65 - 100 mg/dL    BUN 16 6 - 20 MG/DL    Creatinine 1.20 0.70 - 1.30 MG/DL    BUN/Creatinine ratio 13 12 - 20      GFR est AA >60 >60 ml/min/1.73m2    GFR est non-AA >60 >60 ml/min/1.73m2    Calcium 9.7 8.5 - 10.1 MG/DL    Bilirubin, total 0.6 0.2 - 1.0 MG/DL    ALT (SGPT) 22 12 - 78 U/L    AST (SGOT) 16 15 - 37 U/L    Alk.  phosphatase 73 45 - 117 U/L    Protein, total 7.3 6.4 - 8.2 g/dL    Albumin 3.7 3.5 - 5.0 g/dL    Globulin 3.6 2.0 - 4.0 g/dL    A-G Ratio 1.0 (L) 1.1 - 2.2     TROPONIN I    Collection Time: 04/13/21  1:30 PM   Result Value Ref Range    Troponin-I, Qt. <0.05 <0.05 ng/mL   EKG, 12 LEAD, INITIAL    Collection Time: 04/13/21  4:06 PM   Result Value Ref Range    Ventricular Rate 95 BPM    Atrial Rate 95 BPM    P-R Interval 188 ms    QRS Duration 72 ms    Q-T Interval 292 ms    QTC Calculation (Bezet) 366 ms    Calculated P Axis 52 degrees    Calculated R Axis 59 degrees    Calculated T Axis 45 degrees    Diagnosis       ** Poor data quality, interpretation may be adversely affected  Normal sinus rhythm  Septal infarct (cited on or before 29-OCT-2016)  When compared with ECG of 29-OCT-2016 16:51,  No significant change was found  Confirmed by Vijaya Apt ([id number]) on 4/13/2021 4:24:48 PM           Radiologic Studies -   MRI CODE NEURO BRAIN WO CONT   Final Result   1. No acute findings no mass   2. Minimal nonspecific white matter changes. CT Results  (Last 48 hours)    None        CXR Results  (Last 48 hours)    None            Medical Decision Making   I am the first provider for this patient. I reviewed the vital signs, available nursing notes, past medical history, past surgical history, family history and social history. Vital Signs-Reviewed the patient's vital signs. No data found. Vitals:    04/13/21 1500 04/13/21 1530 04/13/21 1600 04/13/21 1758   BP: 139/77 137/85 (!) 143/79 (!) 149/87   Pulse:       Resp:       Temp:       SpO2: 96% 94% 96% 99%   Weight:       Height:           Records Reviewed: Nursing records and medical records reviewed    MDM:  Patient presenting with generalized fatigue/weakness. Stable vitals and nontoxic appearing. DDx: infection, anemia, electrolyte anomoly (hypo or hyperkalemia, hypomagnesemia), hypothyroid, dehydration, depression, CA, ACS. Will obtain EKG, UA, labwork for any urgent/emergent pathology. Will reassess and monitor while in ED. Provider Notes (Medical Decision Making):   Patient is a 59-year-old male with multiple stroke risk factors presenting with intermittent right lower extremity weakness. Some concern for possible occult stroke so MRI was performed of the brain which showed no evidence of acute stroke or any acute cause to his symptoms. His EKG, cardiac monitoring, and troponin level showed no evidence of acute coronary syndrome. His blood work showed that he was hyperglycemic without evidence of DKA. I feel this may be diabetic related and strict medication compliance and dietary compliance was urged and education was provided. Patient will follow-up with his primary care doctor as an outpatient and return to the ER if his symptoms worsen in any way.     ED Course:   Initial assessment performed. The patients presenting problems have been discussed, and they are in agreement with the care plan formulated and outlined with them. I have encouraged them to ask questions as they arise throughout their visit. Critical Care:  None      Disposition:  8:32 AM  Vadim Jimenez's  results have been reviewed with him. He has been counseled regarding his diagnosis. He verbally conveys understanding and agreement of the signs, symptoms, diagnosis, treatment and prognosis and additionally agrees to follow up as recommended with Dr. Sally Jain, NP in 24 - 48 hours. He also agrees with the care-plan and conveys that all of his questions have been answered. I have also put together some discharge instructions for him that include: 1) educational information regarding their diagnosis, 2) how to care for their diagnosis at home, as well a 3) list of reasons why they would want to return to the ED prior to their follow-up appointment, should their condition change. DISCHARGE PLAN:  1. Discharge Medication List as of 4/13/2021  5:59 PM      START taking these medications    Details   naproxen (Naprosyn) 500 mg tablet Take 1 Tab by mouth two (2) times daily (with meals) for 10 days. , Normal, Disp-20 Tab, R-0         CONTINUE these medications which have NOT CHANGED    Details   aspirin delayed-release 81 mg tablet Take 1 Tab by mouth daily. , Normal, Disp-90 Tab, R-1      cetirizine (ZYRTEC) 10 mg tablet TAKE 1 TABLET BY MOUTH ONCE DAILY AS NEEDED FOR ALLERGIES, Normal, Disp-90 Tab, R-1      enalapril-hydroCHLOROthiazide (VASERETIC) 10-25 mg tablet Take 1 Tab by mouth daily. , Normal, Disp-90 Tab, R-0      empagliflozin (Jardiance) 10 mg tablet Take 1 tablet by mouth once daily, Normal, Disp-90 Tab, R-0      insulin detemir U-100 (Levemir FlexTouch U-100 Insuln) 100 unit/mL (3 mL) inpn 50 Units by SubCUTAneous route nightly., Normal, Disp-45 mL, R-0      liraglutide (Victoza 3-Demar) 0.6 mg/0.1 mL (18 mg/3 mL) pnij ADMINISTER 1.8 MG UNDER THE SKIN DAILY, Normal, Disp-45 mL, R-0      metFORMIN (GLUCOPHAGE) 1,000 mg tablet TAKE 1 TABLET BY MOUTH TWICE DAILY WITH MEALS FOR DIABETES( REPLACES JANUMET), Normal, Disp-180 Tab, R-1      rosuvastatin (CRESTOR) 40 mg tablet Take 1 Tab by mouth nightly., Normal, Disp-90 Tab, R-1      tadalafiL (CIALIS) 10 mg tablet Take 1 Tab by mouth daily as needed for Erectile Dysfunction. 30 minutes before intercourse, Normal, Disp-20 Tab, R-1      albuterol (PROVENTIL HFA, VENTOLIN HFA, PROAIR HFA) 90 mcg/actuation inhaler Take 1 Puff by inhalation every six (6) hours as needed for Wheezing., Normal, Disp-1 Inhaler, R-1Please dispense ventolin      BD Ultra-Fine Short Pen Needle 31 gauge x 5/16\" ndle USE TO INJECT LEVEMIR AND VICTOZA UNDER THE SKIN TWICE DAILY, Normal, Disp-1 Package,R-11      FreeStyle Giovanni 14 Day Sensor kit USE TO CHECK BLOOD SUGAR AS DIRECTED, Normal, Disp-15 Kit, R-0      FREESTYLE GIOVANNI 14 DAY READER misc USE TO CHECK BLOOD SUGAR ONCE EVERY 2 WEEKS, Normal, Disp-2 Each, R-1      ONETOUCH ULTRA BLUE TEST STRIP strip CHECK BLOOD SUGAR THREE TIMES DAILY, Normal, Disp-100 Strip, R-11      Insulin Needles, Disposable, 30 gauge x 1/3\" USE TO INJECT LEVEMIR AND VICTOZA TWICE DAILY, Normal, Disp-1 Pen Needle, R-0      Blood-Glucose Meter monitoring kit Check sugars TID. E11.65. Once touch, Normal, Disp-1 Kit, R-0      Lancets misc Use as directed. Dx: e11.65 check sugars TID, Normal, Disp-1 Each, R-11           2. Follow-up Information     Follow up With Specialties Details Why Contact Allison Buck., NP Nurse Practitioner In 3 days For a follow-up evaluation. [id number]      Carie Ring MD Cardiology In 1 week For a follow-up evaluation. PLEASE CALL DR. LITTLE'S OFFICE TO ARRANGE FOR A FOLLOW-UP APPOINTMENT. [address]  [address]  [place] [address] [facility]  [phone]          3. Return to ED if worse     Diagnosis     Clinical Impression:   1. Other mononeuropathy    2. Palpitations    3. Hyperglycemia        Attestations:    Rayne Burch MD    Please note that this dictation was completed with [employer], the computer voice recognition software. Quite often unanticipated grammatical, syntax, homophones, and other interpretive errors are inadvertently transcribed by the computer software. Please disregard these errors. Please excuse any errors that have escaped final proofreading. Thank you.

## 2021-04-20 ENCOUNTER — OFFICE VISIT (OUTPATIENT)
Dept: INTERNAL MEDICINE CLINIC | Age: 51
End: 2021-04-20
Payer: COMMERCIAL

## 2021-04-20 ENCOUNTER — HOSPITAL ENCOUNTER (OUTPATIENT)
Dept: GENERAL RADIOLOGY | Age: 51
Discharge: HOME | End: 2021-04-20
Payer: COMMERCIAL

## 2021-04-20 VITALS
OXYGEN SATURATION: 93 % | HEIGHT: 75 IN | TEMPERATURE: 98.9 F | WEIGHT: 315 LBS | RESPIRATION RATE: 18 BRPM | DIASTOLIC BLOOD PRESSURE: 90 MMHG | SYSTOLIC BLOOD PRESSURE: 135 MMHG | HEART RATE: 96 BPM | BODY MASS INDEX: 39.17 KG/M2

## 2021-04-20 DIAGNOSIS — E11.65 TYPE 2 DIABETES MELLITUS WITH HYPERGLYCEMIA, WITH LONG-TERM CURRENT USE OF INSULIN (HCC): Primary | ICD-10-CM

## 2021-04-20 DIAGNOSIS — R29.898 RIGHT LEG WEAKNESS: ICD-10-CM

## 2021-04-20 DIAGNOSIS — I10 ESSENTIAL HYPERTENSION: ICD-10-CM

## 2021-04-20 DIAGNOSIS — J30.89 ENVIRONMENTAL AND SEASONAL ALLERGIES: ICD-10-CM

## 2021-04-20 DIAGNOSIS — M17.11 PRIMARY OSTEOARTHRITIS OF RIGHT KNEE: ICD-10-CM

## 2021-04-20 DIAGNOSIS — G62.9 NEUROPATHY: ICD-10-CM

## 2021-04-20 DIAGNOSIS — Z79.4 TYPE 2 DIABETES MELLITUS WITH HYPERGLYCEMIA, WITH LONG-TERM CURRENT USE OF INSULIN (HCC): Primary | ICD-10-CM

## 2021-04-20 LAB
GLUCOSE POC: 121 MG/DL
HBA1C MFR BLD HPLC: 7.1 %

## 2021-04-20 PROCEDURE — 72100 X-RAY EXAM L-S SPINE 2/3 VWS: CPT

## 2021-04-20 PROCEDURE — 83036 HEMOGLOBIN GLYCOSYLATED A1C: CPT | Performed by: NURSE PRACTITIONER

## 2021-04-20 PROCEDURE — 99214 OFFICE O/P EST MOD 30 MIN: CPT | Performed by: NURSE PRACTITIONER

## 2021-04-20 PROCEDURE — 82962 GLUCOSE BLOOD TEST: CPT | Performed by: NURSE PRACTITIONER

## 2021-04-20 RX ORDER — MINERAL OIL
180 ENEMA (ML) RECTAL
Qty: 90 TAB | Refills: 0 | Status: SHIPPED | OUTPATIENT
Start: 2021-04-20 | End: 2021-06-25

## 2021-04-20 RX ORDER — GABAPENTIN 300 MG/1
300 CAPSULE ORAL 3 TIMES DAILY
Qty: 90 CAP | Refills: 0 | Status: SHIPPED | OUTPATIENT
Start: 2021-04-20 | End: 2021-05-13 | Stop reason: SDUPTHER

## 2021-04-20 NOTE — PROGRESS NOTES
Chief Complaint   Patient presents with   [address] [place] [address] [facility] Follow-up     pt reports right leg tingling and states when he stands leg will give out x easter sunday     Request For New Medication     allergy        1. Have you been to the ER, urgent care clinic since your last visit? Hospitalized since your last visit? No    2. Have you seen or consulted any other health care providers outside of the [address] [place] [address] since your last visit? Include any pap smears or colon screening.  No

## 2021-04-20 NOTE — PROGRESS NOTES
Subjective: (As above and below)     Chief Complaint   Patient presents with   [facility] ED Follow-up     pt reports right leg tingling and states when he stands leg will give out x easter sunday     Request For New Medication     allergy      Jude Rutherford is a 48y.o. year old male who presents for     ED follow up: presented on 4/13/21 for R leg weakness 1 week prior to ED visit  W/ sensation of knee giving out and neuropathy to R thigh  MRI w/u for CVA normal  He has occ low back pain  Denies saddle numbness, leg weakness, falls or bowel/bladder dysfunction. He has not fallen but feels like he may fall  Does not feel like it is knee that is giving out but his leg  He has not returned to work yet, he does a lot of lifting/walking for work      Diabetic Review of Systems - medication compliance: compliant all of the time, diabetic diet compliance: compliant most of the time, home glucose monitoring: is performed regularly. Hypertension ROS:  taking medications as instructed, no medication side effects noted, no TIAs, no chest pain on exertion, no dyspnea on exertion, no swelling of ankles    BP Readings from Last 3 Encounters:   04/20/21 (!) 135/90   04/13/21 (!) 149/87   02/04/21 114/68         Seasonal allergies: claritin not working, itchy watery eyes, sneezing  No fevers, wheezing    Reviewed PmHx, RxHx, FmHx, SocHx, AllgHx and updated in chart.   Family History   Problem Relation Age of Onset    Diabetes Mother         cancer, liver? hep c    Hypertension Mother     Hypertension Father     Diabetes Brother     Hypertension Brother     Hypertension Maternal Uncle     Diabetes Maternal Grandmother     Diabetes Paternal Grandmother     Hypertension Maternal Uncle     Hypertension Maternal Uncle     Diabetes Paternal Uncle        Past Medical History:   Diagnosis Date    Asthma     Diabetes ([facility] [address]     HTN (hypertension) 3/30/2010    Hypertension     Other and unspecified hyperlipidemia 3/30/2010    Sleep apnea     Type II or unspecified type diabetes mellitus without mention of complication, uncontrolled 3/30/2010      Social History     Socioeconomic History    Marital status: SINGLE     Spouse name: Not on file    Number of children: Not on file    Years of education: Not on file    Highest education level: Not on file   Tobacco Use    Smoking status: Current Some Day Smoker     Packs/day: 0.50     Years: 22.00     Pack years: 11.00     Types: Cigars    Smokeless tobacco: Never Used    Tobacco comment: black and mild   Substance and Sexual Activity    Alcohol use: Yes     Alcohol/week: 10.0 standard drinks     Types: 10 Standard drinks or equivalent per week     Comment: Socially     Drug use: No    Sexual activity: Yes     Partners: Female     Birth control/protection: Condom   Social History Narrative    6/6/17: works at [employer]          Current Outpatient Medications   Medication Sig    fexofenadine (ALLEGRA) 180 mg tablet Take 1 Tab by mouth daily as needed for Allergies.  gabapentin (NEURONTIN) 300 mg capsule Take 1 Cap by mouth three (3) times daily. Max Daily Amount: 900 mg.    naproxen (Naprosyn) 500 mg tablet Take 1 Tab by mouth two (2) times daily (with meals) for 10 days.  aspirin delayed-release 81 mg tablet Take 1 Tab by mouth daily.  enalapril-hydroCHLOROthiazide (VASERETIC) 10-25 mg tablet Take 1 Tab by mouth daily.  empagliflozin (Jardiance) 10 mg tablet Take 1 tablet by mouth once daily    insulin detemir U-100 (Levemir FlexTouch U-100 Insuln) 100 unit/mL (3 mL) inpn 50 Units by SubCUTAneous route nightly.  liraglutide (Victoza 3-Demar) 0.6 mg/0.1 mL (18 mg/3 mL) pnij ADMINISTER 1.8 MG UNDER THE SKIN DAILY    metFORMIN (GLUCOPHAGE) 1,000 mg tablet TAKE 1 TABLET BY MOUTH TWICE DAILY WITH MEALS FOR DIABETES( REPLACES JANUMET)    rosuvastatin (CRESTOR) 40 mg tablet Take 1 Tab by mouth nightly.     tadalafiL (CIALIS) 10 mg tablet Take 1 Tab by mouth daily as needed for Erectile Dysfunction. 30 minutes before intercourse    albuterol (PROVENTIL HFA, VENTOLIN HFA, PROAIR HFA) 90 mcg/actuation inhaler Take 1 Puff by inhalation every six (6) hours as needed for Wheezing.  BD Ultra-Fine Short Pen Needle 31 gauge x 5/16\" ndle USE TO INJECT LEVEMIR AND VICTOZA UNDER THE SKIN TWICE DAILY    FreeStyle Giovanni 14 Day Sensor kit USE TO CHECK BLOOD SUGAR AS DIRECTED    FREESTYLE GIOVANNI 14 DAY READER misc USE TO CHECK BLOOD SUGAR ONCE EVERY 2 WEEKS    ONETOUCH ULTRA BLUE TEST STRIP strip CHECK BLOOD SUGAR THREE TIMES DAILY    Insulin Needles, Disposable, 30 gauge x 1/3\" USE TO INJECT LEVEMIR AND VICTOZA TWICE DAILY    Blood-Glucose Meter monitoring kit Check sugars TID. E11.65. Once touch    Lancets misc Use as directed. Dx: e11.65 check sugars TID     No current facility-administered medications for this visit. Review of Systems:   Constitutional:    Negative for fever and chills, negative diaphoresis. HEENT:              Negative for neck pain and stiffness. Eyes:                  Negative for visual disturbance, itching, redness or discharge. Respiratory:        Negative for cough and shortness of breath. Cardiovascular:  Negative for chest pain and palpitations. Gastrointestinal: Negative for nausea, vomiting, abdominal pain, diarrhea or constipation. Genitourinary:     Negative for dysuria and frequency. Musculoskeletal: Negative for falls, tenderness and swelling. Skin:                    Negative for rash, masses or lesions. Neurological:       Negative for dizzyness, seizure, loss of consciousness, weakness and numbness.      Objective:     Vitals:    04/20/21 1338   BP: (!) 135/90   Pulse: 96   Resp: 18   Temp: 98.9 °F (37.2 °C)   TempSrc: Temporal   SpO2: 93%   Weight: 333 lb (151 kg)   Height: 6' 3\" (1.905 m)       Results for orders placed or performed in visit on 04/20/21   AMB POC HEMOGLOBIN A1C   Result Value Ref Range    Hemoglobin A1c (POC) 7.1 %     Results for orders placed or performed in visit on 04/20/21   AMB POC HEMOGLOBIN A1C   Result Value Ref Range    Hemoglobin A1c (POC) 7.1 %         Physical Examination: General appearance - alert, well appearing, and in no distress  Mental status - alert, oriented to person, place, and time  Chest - clear to auscultation, no wheezes, rales or rhonchi, symmetric air entry  Heart - normal rate, regular rhythm, normal S1, S2, no murmurs, rubs, clicks or gallops  Neurological - alert, oriented, normal speech, no focal findings or movement disorder noted  Musculoskeletal - no joint tenderness, deformity or swelling  No pain to low back or paraspinals, neg slr  Gait is slow but stead    Extremities - peripheral pulses normal, no pedal edema, no clubbing or cyanosis      Assessment/ Plan:       1. Type 2 diabetes mellitus with hyperglycemia, with long-term current use of insulin (HCC)  Improving!  - AMB POC GLUCOSE BLOOD, BY GLUCOSE MONITORING DEVICE  - AMB POC HEMOGLOBIN A1C    2. Essential hypertension  Improving  Cont working on diet improvements    3. Environmental and seasonal allergies    - fexofenadine (ALLEGRA) 180 mg tablet; Take 1 Tab by mouth daily as needed for Allergies. Dispense: 90 Tab; Refill: 0    4. Right leg weakness    - XR SPINE LUMB 2 OR 3 V; Future  - REFERRAL TO PHYSICAL THERAPY  - XR SPINE LUMB 2 OR 3 V; Future    5. Primary osteoarthritis of right knee    - REFERRAL TO PHYSICAL THERAPY    6. Neuropathy    - gabapentin (NEURONTIN) 300 mg capsule; Take 1 Cap by mouth three (3) times daily. Max Daily Amount: 900 mg. Dispense: 90 Cap; Refill: 0      I have discussed the diagnosis with the patient and the intended plan as seen in the above orders. The patient has received an after-visit summary and questions were answered concerning future plans. Pt conveyed understanding of plan.       Medication Side Effects and Warnings were discussed with patient: yes  Patient Labs were reviewed: yes  Patient Past Records were reviewed:  yes    Kait Whalen.  Lisandro Perry NP

## 2021-04-20 NOTE — PATIENT INSTRUCTIONS
Gabapentin for nerve \"pain\", can make you sleepy, I suggest taking twice a day to start to see how you do Physical therapy Back xray I don't have any forms from your job- please have them refax to [phone]

## 2021-04-27 ENCOUNTER — HOSPITAL ENCOUNTER (OUTPATIENT)
Dept: PHYSICAL THERAPY | Age: 51
Discharge: HOME | End: 2021-04-27
Payer: COMMERCIAL

## 2021-04-27 PROCEDURE — 97110 THERAPEUTIC EXERCISES: CPT | Performed by: PHYSICAL THERAPIST

## 2021-04-27 PROCEDURE — 97162 PT EVAL MOD COMPLEX 30 MIN: CPT | Performed by: PHYSICAL THERAPIST

## 2021-04-27 NOTE — PROGRESS NOTES
[address]    OUTPATIENT PHYSICAL THERAPY    INITIAL EVALUATION      NAME: Sajan Conley AGE: 48 y.o. GENDER: male  DATE: 4/27/2021  REFERRING PHYSICIAN: Jerrod SALAS, *    OBJECTIVE DATA SUMMARY:   Medical Diagnosis: Pain in right knee (M25.561)  PT Diagnosis: Other reduced mobility secondary to impaired RLE sensation and weakness  Date of Onset: On and off back and knee pain with anterior thigh tingling since Thanksgiving 2020; gradually increased since 4/3/2021  Mechanism of Injury/Chief Complaint: Insidious  Present Symptoms: Patient presents with right medial knee pain as well as aching pain in low back. Patient experiences tingling at right anterior thigh. Occasional numbness in B feet. Patient experiences occasional feeling of right knee giving out. Increased pain and buckling sensation with prolonged standing. Functional Deficits and Limitations:   [x]     Sitting:   []    Dressing:   []    Reaching:  [x]     Standing:   []     Bathing:   [x]    Lifting:  [x]     Walking:   [x]     Cooking:   []    Yardwork:  [x]     Sleeping:   [x]     Cleaning:   []     Driving:  [x]     Work Tasks:  []     Recreation:   [x]    Other: stair climbing (descending)    HISTORY:  Past Medical History:   Past Medical History:   Diagnosis Date    Asthma     Diabetes ([facility] [address]     HTN (hypertension) 3/30/2010    Hypertension     Other and unspecified hyperlipidemia 3/30/2010    Sleep apnea     Type II or unspecified type diabetes mellitus without mention of complication, uncontrolled 3/30/2010     Past Surgical History:   Procedure Laterality Date    ID ABDOMEN SURGERY PROC UNLISTED      hernia repair       Precautions: None  Current Medications:  Allegra; Gabapentin; Aspirin; Jardiance; Vaseretic; Insulin; Metformin; Crestor; Cialis;  Albuterol  Prior Level of Function/Home Situation: Takes time with ADLs  Personal factors and/or comorbidities impacting plan of care: Diabetes, Asthma  Social/Work History:   at [employer] and [employer] (has missed 14 days of work so far, tentative return date 5/10/21)  Previous Therapy: Yes, years ago     SUBJECTIVE:   \"I think the medication and compression stockings have helped. \"    Patients goals for therapy: to get my right leg stronger and stand for longer without feeling like it will give out    OBJECTIVE DATA SUMMARY:     Lumbar spine x-ray 4/20/21:  Frontal and lateral view the lumbar sacral spine are obtained, the alignment is satisfactory, prominent disc space narrowing seen at L4-5. Posterior element  hypertrophy seen at L4-5 and L5-S1. The pedicles are visualized no fracture or focal lytic lesion.     EXAMINATION/PRESENTATION/DECISION MAKING:   Pain:  Location: Low back, RLE  Quality: aching and tingling  Now: 2/10  Best: 1/10  Worst: 5/10  Factors that improve pain: medication: used and beneficial; compression stockings    Posture:   Mild rounded shoulders    Strength:      LEFT  RIGHT  Hip flexion  5/5  4/5  Hip abduction  5/5  4/5  Hip extension  4+/5  4/5  Knee flexion  5/5  5/5  Knee extension  5/5  5/5   Ankle DF  5/5  5/5  Ankle PF  5/5  5/5    Range of Motion:   Lumbar Spine (AROM)  (*Measured 3rd finger from the floor)  Flexion  8\"; stretch  Extension 25% limited; discomfort  R side bend* WNL  L side bend* WNL; pain at right side of low back   R rotation 25% limited; pain at right of low back   L rotation WNL    Right knee AAROM in supine:   Flexion: 118 degrees; medial knee pain  Extension: 0 degrees    Left knee AAROM in supine:   Flexion: 128 degrees  Extension: 0 degrees    B hamstrings tight    Joint Mobility:   Right patellar mobs hypomobile and discomfort present  Hypomobility at L3-5; discomfort     Palpation:   Tender to palpation at medial right knee  Tender at right lumbar paraspinals and QL    Neurologic Assessment:   Tone: Normal   Sensation: Tingling present at right anterior thigh and occasionally down RLE to foot; occasional numbness in B feet   Reflexes: NT    Special Tests:   (+) SLR    Mobility:   Transitional Movements: Increased time to perform    Gait: Antalgic gait    Balance: WNL    Functional Measure:   LEFS: 47/80     Based on the above components, the patient evaluation is determined to be of the following complexity level: MEDIUM    TREATMENT/INTERVENTION:  Modalities (Rationale): None this date    Therapeutic Exercises: to develop strength, endurance, range of motion, and flexibility  Initial HEP provided 4/27/2021 (Access Code [email or web address]):  LTR; supine piriformis stretch; supine hip abduction with TB (green TB dispensed)    Exercises in BOLD performed this date:    Supine:   LTR: 10 reps  Piriformis stretch: 30 sec hold, right  Hip abduction with green TB: 10 reps    Manual Therapy: for joint mobilization/manipulations and soft tissue mobilization  None this date    Neuro Re-Education: to improve movement, balance, coordination, kinesthetic sense, posture, and proprioception for sitting or standing balance  None this date    Therapeutic Activities: to improve functional performance, power, or agility  None this date    Ambulation/Gait Training:  None this date    Activity tolerance and post treatment pain report:   Fair; 2/10    Education:  [x]     Home exercise program provided. Education was provided to the patient on the following topics: Patient provided with initial HEP and educated on importance of regular performance of exercises in pain free ROM. Patient verbalized understanding of the topics presented. ASSESSMENT:   Maira Leos is a 48 y.o. male who presents with right medial knee pain and aching pain in low back. Patient experiences tingling at right anterior thigh. Symptoms likely related to Sciatica. Patient experiences occasional feeling of right knee giving out. Weakness in right hip musculature noted. Increased pain and buckling sensation with prolonged standing. Patient provided with initial HEP and educated on importance of regular performance of exercises in pain free ROM. Physical therapy problems based on objective data include: pain affecting function, decrease ROM, decrease strength, decrease ADL/ functional abilitiies, decrease activity tolerance, decrease flexibility/ joint mobility and decrease transfer abilities . Patient will benefit from skilled intervention to address these impairments. Rehabilitation potential is considered to be Good. Factors which may influence rehabilitation potential include good motivation. PLAN OF CARE:   Recommendations and Planned Interventions Include:  therapeutic activities, therapeutic exercises, manual therapy, neuro re-education, posture/biomechanics, traction, heat/ice, E-stim, home exercise program, TENS and dry needling    Frequency/Duration:  Patient will benefit from physical therapy visits 1-2 times per week over 8 weeks to optimize improvement in these areas. GOALS  Short term goals  Time frame: 4 weeks  1. Patient will be compliant and independent with the initial HEP as evidenced by being able to perform without cuing. 2. Patient will report a 25% improvement in symptoms. 3. Patient report a 25% improvement in sleeping. 4. Patient will have an increased tolerance for standing to allow 45 minutes of the activity before symptoms start. 5. Patient will tolerate 20 minutes of clinic activities before increase of symptoms. Long term goals  Time frame: 8 weeks  1. Patient will report pain level decrease to 0/10 to allow increased ease of movement. 2. Patient will have an improved score on the LEFS outcome measure by 9 points to demonstrate an increase in functional activity tolerance. 3. Patient will be independent in final individualized HEP. 4. Patient will have an increase in right knee flexion ROM to 128 degrees to allow performance of work tasks.    5. Patient will have an increase in right hip abductor strength to 5/5 to allow performance of work tasks. 6. Patient will return to standing without being limited by symptoms. 7. Patient will sleep 6-8 hours without being interrupted by pain. [x]     Patient has participated in goal setting and agrees to work toward plan of care. [x]     Patient was instructed to call if questions or concerns arise. Thank you for this referral.  Av Cotto, PT   Time Calculation: 68 mins    Patient Time in clinic:   Start Time: 1302   Stop Time: 1410    TREATMENT PLAN EFFECTIVE DATES:   4/27/2021 TO 7/2/2021  I have read the above plan of care for [facility] and certify the need for skilled physical therapy services.     Physician Signature: ____________________________________________________    Date: _________________________________________________________________

## 2021-04-28 ENCOUNTER — PATIENT OUTREACH (OUTPATIENT)
Dept: CASE MANAGEMENT | Age: 51
End: 2021-04-28

## 2021-04-28 NOTE — PROGRESS NOTES
Patient resolved from [address] Transitions episode on 4/28/2021. Discussed COVID-19 related testing which was not done at this time. Test results were not done. Patient informed of results, if available? N/A     Patient/family has been provided the following resources and education related to COVID-19:                         Signs, symptoms and red flags related to COVID-19            [facility] exposure and quarantine guidelines            [employer] exposure contact - [phone]            Contact for their local Department of Health                 Patient currently reports that the following symptoms have improved:  no new symptoms. No further outreach scheduled with this CTN/ACM/LPN/HC/ MA. Episode of Care resolved. Patient has this CTN/ACM/LPN/HC/MA contact information if future needs arise.

## 2021-05-03 ENCOUNTER — HOSPITAL ENCOUNTER (OUTPATIENT)
Dept: PHYSICAL THERAPY | Age: 51
Discharge: HOME | End: 2021-05-03
Payer: COMMERCIAL

## 2021-05-03 PROCEDURE — 97110 THERAPEUTIC EXERCISES: CPT | Performed by: PHYSICAL THERAPIST

## 2021-05-03 NOTE — PROGRESS NOTES
[facility]  [address], [address] [facility]    OUTPATIENT PHYSICAL THERAPY DAILY TREATMENT NOTE  VISIT: 2    NAME: Cuong Anderson AGE: 48 y.o. GENDER: male  DATE: 5/3/2021  REFERRING PHYSICIAN: Kiley Parekh., *      GOALS  Short term goals  Time frame: 4 weeks  1. Patient will be compliant and independent with the initial HEP as evidenced by being able to perform without cuing. 2. Patient will report a 25% improvement in symptoms. 3. Patient report a 25% improvement in sleeping. 4. Patient will have an increased tolerance for standing to allow 45 minutes of the activity before symptoms start. 5. Patient will tolerate 20 minutes of clinic activities before increase of symptoms.      Long term goals  Time frame: 8 weeks  1. Patient will report pain level decrease to 0/10 to allow increased ease of movement. 2. Patient will have an improved score on the LEFS outcome measure by 9 points to demonstrate an increase in functional activity tolerance. 3. Patient will be independent in final individualized HEP. 4. Patient will have an increase in right knee flexion ROM to 128 degrees to allow performance of work tasks. 5. Patient will have an increase in right hip abductor strength to 5/5 to allow performance of work tasks. 6. Patient will return to standing without being limited by symptoms. 7. Patient will sleep 6-8 hours without being interrupted by pain. SUBJECTIVE:   \"It feels good right now. \"    Pain In: 0/10    OBJECTIVE DATA SUMMARY:   Objective data from initial evaluation:     Lumbar spine x-ray 4/20/21:  Frontal and lateral view the lumbar sacral spine are obtained, the alignment is satisfactory, prominent disc space narrowing seen at L4-5. Posterior element  hypertrophy seen at L4-5 and L5-S1.  The pedicles are visualized no fracture or focal lytic lesion.     EXAMINATION/PRESENTATION/DECISION MAKING:   Pain:  Location: Low back, RLE  Quality: aching and tingling  Now: 2/10  Best: 1/10  Worst: 5/10  Factors that improve pain: medication: used and beneficial; compression stockings     Posture:   Mild rounded shoulders     Strength:                                          LEFT                  RIGHT  Hip flexion                       5/5                     4/5  Hip abduction                 5/5                     4/5  Hip extension                  4+/5                   4/5  Knee flexion                    5/5                     5/5  Knee extension               5/5                     5/5          Ankle DF                         5/5                     5/5  Ankle PF                         5/5                     5/5     Range of Motion:   Lumbar Spine (AROM)  (*Measured 3rd finger from the floor)  Flexion               8\"; stretch  Extension           25% limited; discomfort  R side bend*      WNL  L side bend*      WNL; pain at right side of low back   R rotation           25% limited; pain at right of low back   L rotation           WNL     Right knee AAROM in supine:   Flexion: 118 degrees; medial knee pain  Extension: 0 degrees     Left knee AAROM in supine:   Flexion: 128 degrees  Extension: 0 degrees     B hamstrings tight     Joint Mobility:   Right patellar mobs hypomobile and discomfort present  Hypomobility at L3-5; discomfort      Palpation:   Tender to palpation at medial right knee  Tender at right lumbar paraspinals and QL     Neurologic Assessment:               Tone: Normal               Sensation: Tingling present at right anterior thigh and occasionally down RLE to foot; occasional numbness in B feet               Reflexes: NT     Special Tests:   (+) SLR     Mobility:               Transitional Movements: Increased time to perform                Gait: Antalgic gait     Balance:  WNL     Functional Measure:   LEFS: 47/80     TREATMENT/INTERVENTION:  Modalities (Rationale): None this date     Therapeutic Exercises: to develop strength, endurance, range of motion, and flexibility  Initial HEP provided 4/27/2021 (Access Code [email or web address]):  LTR; supine piriformis stretch; supine hip abduction with TB (green TB dispensed)  Added to HEP 5/3/2021: supine quadriceps stretch; supine hip adductor stretch; standing gastroc stretch; standing hip abduction and extension; lateral band walks; supine and standing sciatic nerve glides     Exercises in BOLD performed this date:     Treadmill: 10 minutes, 1.5 mph    Supine:   LTR: 10 reps  Quadriceps stretch: 30 sec holds B  Hip adductor stretch: 30 sec holds B  Piriformis stretch: 30 sec hold, right  Sciatic nerve glides: 10 reps  Hip abduction with green TB: 10 reps    Standing:   Hip abduction: 2 sets of 10 reps B  Hip extension: 2 sets of 10 reps B  Lateral band walks with green TB: 10 feet x2 B  Gastroc stretch: 30 second holds B  Sciatic nerve glides: 10 reps  Trunk extension: 10 reps     Manual Therapy: for joint mobilization/manipulations and soft tissue mobilization  None this date     Neuro Re-Education: to improve movement, balance, coordination, kinesthetic sense, posture, and proprioception for sitting or standing balance  None this date     Therapeutic Activities: to improve functional performance, power, or agility  None this date     Ambulation/Gait Training:  None this date     Activity tolerance and post treatment pain report:   Good; 0/10    Education:  Education was provided to the patient on the following topics: continue HEP in pain free ROM. []    No changes were made to the home exercise program.  [x]    The following changes were made to the home exercise program: Added to HEP (see above)  Patient verbalized understanding of the topics presented. ASSESSMENT:   Patient returns following initial evaluation. Patient presents with no pain this date. Patient with occasional tingling/numbness at right thigh and down RLE, but overall symptoms have improved.  Patient with regular performance of HEP. Added to HEP this date. Patient with good tolerance to clinic exercises this date. Patient required cues for form with exercises. Patients progression toward goals is as follows:  [x]     Improving appropriately and progressing toward goals  []     Improving slowly and progressing toward goals  []     Not making progress toward goals and plan of care will be adjusted    PLAN OF CARE:   Patient continues to benefit from skilled intervention to address the above impairments. [x]    Continue treatment per established plan of care.   []     Recommend the following changes to the plan of care    Recommendations/Intent for next treatment: progress as able    Matt Mackay, PT   Time Calculation: 50 mins  Patient Time in clinic:   Start Time: 1400   Stop Time: 1450

## 2021-05-05 ENCOUNTER — HOSPITAL ENCOUNTER (OUTPATIENT)
Dept: PHYSICAL THERAPY | Age: 51
Discharge: HOME | End: 2021-05-05
Payer: COMMERCIAL

## 2021-05-05 PROCEDURE — 97110 THERAPEUTIC EXERCISES: CPT | Performed by: PHYSICAL THERAPIST

## 2021-05-05 NOTE — PROGRESS NOTES
[address]    OUTPATIENT PHYSICAL THERAPY DAILY TREATMENT NOTE  VISIT: 3    NAME: Chuy Louis AGE: 48 y.o. GENDER: male  DATE: 5/5/2021  REFERRING PHYSICIAN: Marquita Garcia., *      GOALS  Short term goals  Time frame: 4 weeks  1. Patient will be compliant and independent with the initial HEP as evidenced by being able to perform without cuing. 2. Patient will report a 25% improvement in symptoms. 3. Patient report a 25% improvement in sleeping. 4. Patient will have an increased tolerance for standing to allow 45 minutes of the activity before symptoms start. 5. Patient will tolerate 20 minutes of clinic activities before increase of symptoms.      Long term goals  Time frame: 8 weeks  1. Patient will report pain level decrease to 0/10 to allow increased ease of movement. 2. Patient will have an improved score on the LEFS outcome measure by 9 points to demonstrate an increase in functional activity tolerance. 3. Patient will be independent in final individualized HEP. 4. Patient will have an increase in right knee flexion ROM to 128 degrees to allow performance of work tasks. 5. Patient will have an increase in right hip abductor strength to 5/5 to allow performance of work tasks. 6. Patient will return to standing without being limited by symptoms. 7. Patient will sleep 6-8 hours without being interrupted by pain. SUBJECTIVE:   \"It's been feeling good since Monday. \"    Pain In: 0/10    OBJECTIVE DATA SUMMARY:   Objective data from initial evaluation:     Lumbar spine x-ray 4/20/21:  Frontal and lateral view the lumbar sacral spine are obtained, the alignment is satisfactory, prominent disc space narrowing seen at L4-5. Posterior element  hypertrophy seen at L4-5 and L5-S1.  The pedicles are visualized no fracture or focal lytic lesion.     EXAMINATION/PRESENTATION/DECISION MAKING:   Pain:  Location: Low back, RLE  Quality: aching and tingling  Now: 2/10  Best: 1/10  Worst: 5/10  Factors that improve pain: medication: used and beneficial; compression stockings     Posture:   Mild rounded shoulders     Strength:                                          LEFT                  RIGHT  Hip flexion                       5/5                     4/5  Hip abduction                 5/5                     4/5  Hip extension                  4+/5                   4/5  Knee flexion                    5/5                     5/5  Knee extension               5/5                     5/5          Ankle DF                         5/5                     5/5  Ankle PF                         5/5                     5/5     Range of Motion:   Lumbar Spine (AROM)  (*Measured 3rd finger from the floor)  Flexion               8\"; stretch  Extension           25% limited; discomfort  R side bend*      WNL  L side bend*      WNL; pain at right side of low back   R rotation           25% limited; pain at right of low back   L rotation           WNL     Right knee AAROM in supine:   Flexion: 118 degrees; medial knee pain  Extension: 0 degrees     Left knee AAROM in supine:   Flexion: 128 degrees  Extension: 0 degrees     B hamstrings tight     Joint Mobility:   Right patellar mobs hypomobile and discomfort present  Hypomobility at L3-5; discomfort      Palpation:   Tender to palpation at medial right knee  Tender at right lumbar paraspinals and QL     Neurologic Assessment:               Tone: Normal               Sensation: Tingling present at right anterior thigh and occasionally down RLE to foot; occasional numbness in B feet               Reflexes: NT     Special Tests:   (+) SLR     Mobility:               Transitional Movements: Increased time to perform                Gait: Antalgic gait     Balance:  WNL     Functional Measure:   LEFS: 47/80     TREATMENT/INTERVENTION:  Modalities (Rationale): None this date     Therapeutic Exercises: to develop strength, endurance, range of motion, and flexibility  Initial HEP provided 4/27/2021 (Access Code [email or web address]):  LTR; supine piriformis stretch; supine hip abduction with TB (green TB dispensed)  Added to HEP 5/3/2021: supine quadriceps stretch; supine hip adductor stretch; standing gastroc stretch; standing hip abduction and extension; lateral band walks; supine and standing sciatic nerve glides     Exercises in BOLD performed this date:     Treadmill: 10 minutes, 1.5 mph  LBE: 5 minutes, level 1    Supine:   LTR: 10 reps  Quadriceps stretch: 2 reps with 30 sec holds B  Hip adductor stretch: 2 reps with 30 sec holds B  Piriformis stretch: 2 reps with 30 sec hold, right  Sciatic nerve glides: 2 sets of 10 reps  Hip abduction with green TB: 2 sets of 10 reps    Standing:   Hip abduction: 2 sets of 10 reps B  Hip extension: 2 sets of 10 reps B  Lateral band walks with green TB: 10 feet x2 B  Gastroc stretch: 2 reps with 30 second holds B  Sciatic nerve glides: 10 reps  Trunk extension: 2 reps with 15 sec holds     Manual Therapy: for joint mobilization/manipulations and soft tissue mobilization  None this date     Neuro Re-Education: to improve movement, balance, coordination, kinesthetic sense, posture, and proprioception for sitting or standing balance  None this date     Therapeutic Activities: to improve functional performance, power, or agility  Educated on proper body mechanics and practiced lifting 20# and 40# from elevated surface     Ambulation/Gait Training:  None this date     Activity tolerance and post treatment pain report:   Good; 0/10    Education:  Education was provided to the patient on the following topics: continue HEP in pain free ROM. [x]    No changes were made to the home exercise program.  []    The following changes were made to the home exercise program  Patient verbalized understanding of the topics presented. ASSESSMENT:   Patient presents with no back or leg pain this date. He has not experienced any tingling/numbness at right thigh or down RLE since Tuesday morning. Patient with regular performance of HEP. Patient with good tolerance to clinic exercises this date. Cues for form provided as needed. Patient practiced lifting 20 and 40 pounds with good body mechanics as it is occasionally required for work. Patients progression toward goals is as follows:  [x]     Improving appropriately and progressing toward goals  []     Improving slowly and progressing toward goals  []     Not making progress toward goals and plan of care will be adjusted    PLAN OF CARE:   Patient continues to benefit from skilled intervention to address the above impairments. [x]    Continue treatment per established plan of care.   []     Recommend the following changes to the plan of care    Recommendations/Intent for next treatment: progress as able    Indio Viramontes, PT   Time Calculation: 45 mins  Patient Time in clinic:   Start Time: 1400   Stop Time: 0772

## 2021-05-12 ENCOUNTER — HOSPITAL ENCOUNTER (OUTPATIENT)
Dept: PHYSICAL THERAPY | Age: 51
Discharge: HOME | End: 2021-05-12
Payer: COMMERCIAL

## 2021-05-12 DIAGNOSIS — M54.16 LUMBAR RADICULOPATHY: Primary | ICD-10-CM

## 2021-05-12 DIAGNOSIS — M51.36 NARROWING OF LUMBAR INTERVERTEBRAL DISC SPACE: ICD-10-CM

## 2021-05-12 PROCEDURE — 97110 THERAPEUTIC EXERCISES: CPT | Performed by: PHYSICAL THERAPIST

## 2021-05-12 PROCEDURE — 97140 MANUAL THERAPY 1/> REGIONS: CPT | Performed by: PHYSICAL THERAPIST

## 2021-05-12 RX ORDER — PREDNISONE 10 MG/1
10 TABLET ORAL SEE ADMIN INSTRUCTIONS
Qty: 21 TAB | Refills: 0 | Status: SHIPPED | OUTPATIENT
Start: 2021-05-12 | End: 2021-07-20

## 2021-05-12 NOTE — PROGRESS NOTES
[address]    OUTPATIENT PHYSICAL THERAPY DAILY TREATMENT NOTE  VISIT: 4    NAME: Nathalie Jack AGE: 46 y.o. GENDER: male  DATE: 5/12/2021  REFERRING PHYSICIAN: Leena Box., *      GOALS  Short term goals  Time frame: 4 weeks  1. Patient will be compliant and independent with the initial HEP as evidenced by being able to perform without cuing. 2. Patient will report a 25% improvement in symptoms. 3. Patient report a 25% improvement in sleeping. 4. Patient will have an increased tolerance for standing to allow 45 minutes of the activity before symptoms start. 5. Patient will tolerate 20 minutes of clinic activities before increase of symptoms.      Long term goals  Time frame: 8 weeks  1. Patient will report pain level decrease to 0/10 to allow increased ease of movement. 2. Patient will have an improved score on the LEFS outcome measure by 9 points to demonstrate an increase in functional activity tolerance. 3. Patient will be independent in final individualized HEP. 4. Patient will have an increase in right knee flexion ROM to 128 degrees to allow performance of work tasks. 5. Patient will have an increase in right hip abductor strength to 5/5 to allow performance of work tasks. 6. Patient will return to standing without being limited by symptoms. 7. Patient will sleep 6-8 hours without being interrupted by pain. SUBJECTIVE:   \"I fell on Thursday when I was out in the yard cutting grass. My legs just gave out on me. \"    Pain In: 7/10 low back    OBJECTIVE DATA SUMMARY:   Objective data from initial evaluation:     Lumbar spine x-ray 4/20/21:  Frontal and lateral view the lumbar sacral spine are obtained, the alignment is satisfactory, prominent disc space narrowing seen at L4-5. Posterior element  hypertrophy seen at L4-5 and L5-S1.  The pedicles are visualized no fracture or focal lytic lesion.     EXAMINATION/PRESENTATION/DECISION MAKING:   Pain:  Location: Low back, RLE  Quality: aching and tingling  Now: 2/10  Best: 1/10  Worst: 5/10  Factors that improve pain: medication: used and beneficial; compression stockings     Posture:   Mild rounded shoulders     Strength:                                          LEFT                  RIGHT  Hip flexion                       5/5                     4/5  Hip abduction                 5/5                     4/5  Hip extension                  4+/5                   4/5  Knee flexion                    5/5                     5/5  Knee extension               5/5                     5/5          Ankle DF                         5/5                     5/5  Ankle PF                         5/5                     5/5     Range of Motion:   Lumbar Spine (AROM)  (*Measured 3rd finger from the floor)  Flexion               8\"; stretch  Extension           25% limited; discomfort  R side bend*      WNL  L side bend*      WNL; pain at right side of low back   R rotation           25% limited; pain at right of low back   L rotation           WNL     Right knee AAROM in supine:   Flexion: 118 degrees; medial knee pain  Extension: 0 degrees     Left knee AAROM in supine:   Flexion: 128 degrees  Extension: 0 degrees     B hamstrings tight     Joint Mobility:   Right patellar mobs hypomobile and discomfort present  Hypomobility at L3-5; discomfort      Palpation:   Tender to palpation at medial right knee  Tender at right lumbar paraspinals and QL     Neurologic Assessment:               Tone: Normal               Sensation: Tingling present at right anterior thigh and occasionally down RLE to foot; occasional numbness in B feet               Reflexes: NT     Special Tests:   (+) SLR     Mobility:               Transitional Movements: Increased time to perform                Gait: Antalgic gait     Balance:  WNL     Functional Measure:   LEFS: 47/80     TREATMENT/INTERVENTION:  Modalities (Rationale):to decrease pain and muscle guarding  MHP to low back for 7 minutes in supine at end of session; no skin irritation noted     Therapeutic Exercises: to develop strength, endurance, range of motion, and flexibility  Initial HEP provided 4/27/2021 (Access Code [email or web address]):  LTR; supine piriformis stretch; supine hip abduction with TB (green TB dispensed)  Added to HEP 5/3/2021: supine quadriceps stretch; supine hip adductor stretch; standing gastroc stretch; standing hip abduction and extension; lateral band walks; supine and standing sciatic nerve glides     Exercises in BOLD performed this date:     Treadmill: 10 minutes, 1.5 mph  LBE: 5 minutes, level 1    Supine:   LTR: 10 reps  Quadriceps stretch: 2 reps with 30 sec holds B  Hip adductor stretch: 2 reps with 30 sec holds B  Piriformis stretch: 2 reps with 30 sec hold, right  Sciatic nerve glides: 2 sets of 10 reps  Hip abduction with green TB: 2 sets of 10 reps    Sidelying:   Clamshells: 10 reps  Hip abduction: 5 reps    Quadruped:    Alt LEs: 10 reps B    Standing:   Hip abduction: 2 sets of 10 reps B  Hip extension: 2 sets of 10 reps B  Lateral band walks with green TB: 10 feet x2 B  Gastroc stretch: 2 reps with 30 second holds B  Sciatic nerve glides: 10 reps  Trunk extension: 2 reps with 15 sec holds     Manual Therapy: for joint mobilization/manipulations and soft tissue mobilization  PA mobs L3-S1, grade 2  STM to right lumbar paraspinals     Neuro Re-Education: to improve movement, balance, coordination, kinesthetic sense, posture, and proprioception for sitting or standing balance  None this date     Therapeutic Activities: to improve functional performance, power, or agility  Educated on proper body mechanics and practiced lifting 20# and 40# from elevated surface     Ambulation/Gait Training:  None this date     Activity tolerance and post treatment pain report:   Good; 4/10    Education:  Education was provided to the patient on the following topics: continue HEP in pain free ROM. [x]    No changes were made to the home exercise program.  []    The following changes were made to the home exercise program  Patient verbalized understanding of the topics presented. ASSESSMENT:   Patient presents with increased midline to right sided low back pain today after falling in his yard on Thursday. Patient reports that his legs gave out on him and caused him to fall. Patient with no radicular symptoms down RLE. Patient with limited performance of HEP since fall. Patient tolerated clinic exercises fairly well this date; substituted quadruped and sidelying strengthening exercises for standing exercises this date  Cues for form provided. Patient with significant pain relief following manual therapy this date. Patients progression toward goals is as follows:  [x]     Improving appropriately and progressing toward goals  []     Improving slowly and progressing toward goals  []     Not making progress toward goals and plan of care will be adjusted    PLAN OF CARE:   Patient continues to benefit from skilled intervention to address the above impairments. [x]    Continue treatment per established plan of care.   []     Recommend the following changes to the plan of care    Recommendations/Intent for next treatment: progress as able    Indio Viramontes, PT   Time Calculation: 37 mins  Patient Time in clinic:   Start Time: 0933   Stop Time: 1010

## 2021-05-13 DIAGNOSIS — Z79.4 TYPE 2 DIABETES MELLITUS WITH HYPERGLYCEMIA, WITH LONG-TERM CURRENT USE OF INSULIN (HCC): ICD-10-CM

## 2021-05-13 DIAGNOSIS — E11.65 TYPE 2 DIABETES MELLITUS WITH HYPERGLYCEMIA, WITH LONG-TERM CURRENT USE OF INSULIN (HCC): ICD-10-CM

## 2021-05-13 DIAGNOSIS — G62.9 NEUROPATHY: ICD-10-CM

## 2021-05-13 DIAGNOSIS — I10 ESSENTIAL HYPERTENSION: ICD-10-CM

## 2021-05-13 RX ORDER — ENALAPRIL MALEATE AND HYDROCHLOROTHIAZIDE 10; 25 MG/1; MG/1
TABLET ORAL
Qty: 90 TAB | Refills: 3 | Status: SHIPPED | OUTPATIENT
Start: 2021-05-13 | End: 2021-07-20 | Stop reason: SDUPTHER

## 2021-05-13 RX ORDER — PEN NEEDLE, DIABETIC 30 GX3/16"
NEEDLE, DISPOSABLE MISCELLANEOUS
Qty: 1 PACKAGE | Refills: 11 | Status: SHIPPED | OUTPATIENT
Start: 2021-05-13 | End: 2022-07-11

## 2021-05-13 RX ORDER — LIRAGLUTIDE 6 MG/ML
INJECTION SUBCUTANEOUS
Qty: 45 ML | Refills: 3 | Status: SHIPPED | OUTPATIENT
Start: 2021-05-13 | End: 2021-07-20 | Stop reason: SDUPTHER

## 2021-05-13 RX ORDER — INSULIN DETEMIR 100 [IU]/ML
INJECTION, SOLUTION SUBCUTANEOUS
Qty: 45 ML | Refills: 3 | Status: SHIPPED | OUTPATIENT
Start: 2021-05-13 | End: 2021-07-20 | Stop reason: SDUPTHER

## 2021-05-13 RX ORDER — ALBUTEROL SULFATE 90 UG/1
1 AEROSOL, METERED RESPIRATORY (INHALATION)
Qty: 1 INHALER | Refills: 1 | Status: SHIPPED | OUTPATIENT
Start: 2021-05-13 | End: 2021-05-21 | Stop reason: SDUPTHER

## 2021-05-13 RX ORDER — GABAPENTIN 300 MG/1
300 CAPSULE ORAL 3 TIMES DAILY
Qty: 90 CAP | Refills: 0 | Status: SHIPPED | OUTPATIENT
Start: 2021-05-13 | End: 2021-07-20

## 2021-05-13 NOTE — TELEPHONE ENCOUNTER
Last visit 04/20/2021 KATIA Saldaña   Next appointment 07/20/2021 KATIA Sadlaña   Previous refill encounter(s)   07/31/2020 BD UF Short Pen Needle #1 with 11 refills,   02/04/2021 Ventolin HFA INH #1 with 1 refill,   04/20/2021 Neurontin #90     Requested Prescriptions     Pending Prescriptions Disp Refills    gabapentin (NEURONTIN) 300 mg capsule 90 Cap 0     Sig: Take 1 Cap by mouth three (3) times daily. Max Daily Amount: 900 mg.    albuterol (PROVENTIL HFA, VENTOLIN HFA, PROAIR HFA) 90 mcg/actuation inhaler 1 Inhaler 1     Sig: Take 1 Puff by inhalation every six (6) hours as needed for Wheezing.     Insulin Needles, Disposable, (BD Ultra-Fine Short Pen Needle) 31 gauge x 5/16\" ndle 1 Package 11     Sig: USE TO INJECT LEVEMIR AND VICTOZA UNDER THE SKIN TWICE DAILY

## 2021-05-14 ENCOUNTER — HOSPITAL ENCOUNTER (OUTPATIENT)
Dept: PHYSICAL THERAPY | Age: 51
Discharge: HOME | End: 2021-05-14
Payer: COMMERCIAL

## 2021-05-14 PROCEDURE — 97140 MANUAL THERAPY 1/> REGIONS: CPT | Performed by: PHYSICAL THERAPIST

## 2021-05-14 PROCEDURE — 97110 THERAPEUTIC EXERCISES: CPT | Performed by: PHYSICAL THERAPIST

## 2021-05-14 NOTE — PROGRESS NOTES
[facility]  [address], [address] [facility]    OUTPATIENT PHYSICAL THERAPY DAILY TREATMENT NOTE  VISIT: 5    NAME: Fay Sears AGE: 46 y.o. GENDER: male  DATE: 5/14/2021  REFERRING PHYSICIAN: Quincy Mariscal., *      GOALS  Short term goals  Time frame: 4 weeks  1. Patient will be compliant and independent with the initial HEP as evidenced by being able to perform without cuing. 2. Patient will report a 25% improvement in symptoms. 3. Patient report a 25% improvement in sleeping. 4. Patient will have an increased tolerance for standing to allow 45 minutes of the activity before symptoms start. 5. Patient will tolerate 20 minutes of clinic activities before increase of symptoms.      Long term goals  Time frame: 8 weeks  1. Patient will report pain level decrease to 0/10 to allow increased ease of movement. 2. Patient will have an improved score on the LEFS outcome measure by 9 points to demonstrate an increase in functional activity tolerance. 3. Patient will be independent in final individualized HEP. 4. Patient will have an increase in right knee flexion ROM to 128 degrees to allow performance of work tasks. 5. Patient will have an increase in right hip abductor strength to 5/5 to allow performance of work tasks. 6. Patient will return to standing without being limited by symptoms. 7. Patient will sleep 6-8 hours without being interrupted by pain. SUBJECTIVE:   \"I felt good yesterday and did some walking around [address] and now it's hurting pretty bad again. \"    Pain In: 7/10 low back    OBJECTIVE DATA SUMMARY:   Objective data from initial evaluation:     Lumbar spine x-ray 4/20/21:  Frontal and lateral view the lumbar sacral spine are obtained, the alignment is satisfactory, prominent disc space narrowing seen at L4-5. Posterior element  hypertrophy seen at L4-5 and L5-S1.  The pedicles are visualized no fracture or focal lytic lesion.     EXAMINATION/PRESENTATION/DECISION MAKING:   Pain:  Location: Low back, RLE  Quality: aching and tingling  Now: 2/10  Best: 1/10  Worst: 5/10  Factors that improve pain: medication: used and beneficial; compression stockings     Posture:   Mild rounded shoulders     Strength:                                          LEFT                  RIGHT  Hip flexion                       5/5                     4/5  Hip abduction                 5/5                     4/5  Hip extension                  4+/5                   4/5  Knee flexion                    5/5                     5/5  Knee extension               5/5                     5/5          Ankle DF                         5/5                     5/5  Ankle PF                         5/5                     5/5     Range of Motion:   Lumbar Spine (AROM)  (*Measured 3rd finger from the floor)  Flexion               8\"; stretch  Extension           25% limited; discomfort  R side bend*      WNL  L side bend*      WNL; pain at right side of low back   R rotation           25% limited; pain at right of low back   L rotation           WNL     Right knee AAROM in supine:   Flexion: 118 degrees; medial knee pain  Extension: 0 degrees     Left knee AAROM in supine:   Flexion: 128 degrees  Extension: 0 degrees     B hamstrings tight     Joint Mobility:   Right patellar mobs hypomobile and discomfort present  Hypomobility at L3-5; discomfort      Palpation:   Tender to palpation at medial right knee  Tender at right lumbar paraspinals and QL     Neurologic Assessment:               Tone: Normal               Sensation: Tingling present at right anterior thigh and occasionally down RLE to foot; occasional numbness in B feet               Reflexes: NT     Special Tests:   (+) SLR     Mobility:               Transitional Movements: Increased time to perform                Gait: Antalgic gait     Balance:  WNL     Functional Measure:   LEFS: 47/80     TREATMENT/INTERVENTION:  Modalities (Rationale):to decrease pain and muscle guarding  MHP to low back for 5 minutes in prone at start of session; no skin irritation noted     Therapeutic Exercises: to develop strength, endurance, range of motion, and flexibility  Initial HEP provided 4/27/2021 (Access Code [email or web address]):  LTR; supine piriformis stretch; supine hip abduction with TB (green TB dispensed)  Added to HEP 5/3/2021: supine quadriceps stretch; supine hip adductor stretch; standing gastroc stretch; standing hip abduction and extension; lateral band walks; supine and standing sciatic nerve glides     Exercises in BOLD performed this date:     Treadmill: 10 minutes, 1.5 mph  LBE: 5 minutes, level 1    Supine:   LTR: 10 reps  Quadriceps stretch: 2 reps with 30 sec holds B  Hip adductor stretch: 2 reps with 30 sec holds B  Piriformis stretch: 2 reps with 30 sec hold, right  Sciatic nerve glides: 2 sets of 10 reps  Hip abduction with green TB: 2 sets of 10 reps    Sidelying:   Clamshells: 10 reps  Hip abduction: 5 reps    Quadruped:    Alt LEs: 5 reps B    Standing:   Hip abduction: 2 sets of 10 reps B  Hip extension: 2 sets of 10 reps B  Lateral band walks with green TB: 10 feet x2 B  Gastroc stretch: 2 reps with 30 second holds B  Sciatic nerve glides: 10 reps  Trunk extension: 2 reps with 15 sec holds     Manual Therapy: for joint mobilization/manipulations and soft tissue mobilization  PA mobs L3-S1, grade 2  STM to right lumbar paraspinals and gluteals/lateral hip in sidelying; pain relief noted     Neuro Re-Education: to improve movement, balance, coordination, kinesthetic sense, posture, and proprioception for sitting or standing balance  None this date     Therapeutic Activities: to improve functional performance, power, or agility  Educated on proper body mechanics and practiced lifting 20# and 40# from elevated surface     Ambulation/Gait Training:  None this date     Activity tolerance and post treatment pain report:   Good; 5/10    Education:  Education was provided to the patient on the following topics: continue HEP in pain free ROM. [x]    No changes were made to the home exercise program.  []    The following changes were made to the home exercise program  Patient verbalized understanding of the topics presented. ASSESSMENT:   Patient presents with continued midline to right sided low back pain today. He reports that he felt better yesterday, and then walked around [address]. He reports that the pain increased again this AM with right radicular symptoms present. Patient did not perform any of his HEP since last session. Patient tolerated clinic exercises fairly well this date. Patient with complete relief of RLE radicular symptoms after manual therapy this date. Patient's doctor has prescribed him Prednisone which he is picking up today. He has an MRI scheduled for 5/25/2021. Patients progression toward goals is as follows:  [x]     Improving appropriately and progressing toward goals  []     Improving slowly and progressing toward goals  []     Not making progress toward goals and plan of care will be adjusted    PLAN OF CARE:   Patient continues to benefit from skilled intervention to address the above impairments. [x]    Continue treatment per established plan of care.   []     Recommend the following changes to the plan of care    Recommendations/Intent for next treatment: progress as able    Nadya Moffett, PT   Time Calculation: 30 mins  Patient Time in clinic:   Start Time: 0930   Stop Time: 1000

## 2021-05-19 ENCOUNTER — HOSPITAL ENCOUNTER (OUTPATIENT)
Dept: PHYSICAL THERAPY | Age: 51
Discharge: HOME | End: 2021-05-19
Payer: COMMERCIAL

## 2021-05-19 PROCEDURE — 97110 THERAPEUTIC EXERCISES: CPT | Performed by: PHYSICAL THERAPIST

## 2021-05-19 NOTE — PROGRESS NOTES
[email or web address], [address] [place] [address]    OUTPATIENT PHYSICAL THERAPY DAILY TREATMENT NOTE  VISIT: 6    NAME: Yan Morton AGE: 46 y.o. GENDER: male  DATE: 5/19/2021  REFERRING PHYSICIAN: Td Ross., *      GOALS  Short term goals  Time frame: 4 weeks  1. Patient will be compliant and independent with the initial HEP as evidenced by being able to perform without cuing. 2. Patient will report a 25% improvement in symptoms. 3. Patient report a 25% improvement in sleeping. 4. Patient will have an increased tolerance for standing to allow 45 minutes of the activity before symptoms start. 5. Patient will tolerate 20 minutes of clinic activities before increase of symptoms.      Long term goals  Time frame: 8 weeks  1. Patient will report pain level decrease to 0/10 to allow increased ease of movement. 2. Patient will have an improved score on the LEFS outcome measure by 9 points to demonstrate an increase in functional activity tolerance. 3. Patient will be independent in final individualized HEP. 4. Patient will have an increase in right knee flexion ROM to 128 degrees to allow performance of work tasks. 5. Patient will have an increase in right hip abductor strength to 5/5 to allow performance of work tasks. 6. Patient will return to standing without being limited by symptoms. 7. Patient will sleep 6-8 hours without being interrupted by pain. SUBJECTIVE:   \"The tingling in the leg is still there. \"    Pain In: 5/10 low back    OBJECTIVE DATA SUMMARY:   Objective data from initial evaluation:     Lumbar spine x-ray 4/20/21:  Frontal and lateral view the lumbar sacral spine are obtained, the alignment is satisfactory, prominent disc space narrowing seen at L4-5. Posterior element  hypertrophy seen at L4-5 and L5-S1.  The pedicles are visualized no fracture or focal lytic lesion.     EXAMINATION/PRESENTATION/DECISION MAKING:   Pain:  Location: Low back, RLE  Quality: aching and tingling  Now: 2/10  Best: 1/10  Worst: 5/10  Factors that improve pain: medication: used and beneficial; compression stockings     Posture:   Mild rounded shoulders     Strength:                                          LEFT                  RIGHT  Hip flexion                       5/5                     4/5  Hip abduction                 5/5                     4/5  Hip extension                  4+/5                   4/5  Knee flexion                    5/5                     5/5  Knee extension               5/5                     5/5          Ankle DF                         5/5                     5/5  Ankle PF                         5/5                     5/5     Range of Motion:   Lumbar Spine (AROM)  (*Measured 3rd finger from the floor)  Flexion               8\"; stretch  Extension           25% limited; discomfort  R side bend*      WNL  L side bend*      WNL; pain at right side of low back   R rotation           25% limited; pain at right of low back   L rotation           WNL     Right knee AAROM in supine:   Flexion: 118 degrees; medial knee pain  Extension: 0 degrees     Left knee AAROM in supine:   Flexion: 128 degrees  Extension: 0 degrees     B hamstrings tight     Joint Mobility:   Right patellar mobs hypomobile and discomfort present  Hypomobility at L3-5; discomfort      Palpation:   Tender to palpation at medial right knee  Tender at right lumbar paraspinals and QL     Neurologic Assessment:               Tone: Normal               Sensation: Tingling present at right anterior thigh and occasionally down RLE to foot; occasional numbness in B feet               Reflexes: NT     Special Tests:   (+) SLR     Mobility:               Transitional Movements: Increased time to perform                Gait: Antalgic gait     Balance:  WNL     Functional Measure:   LEFS: 47/80     TREATMENT/INTERVENTION:  Modalities (Rationale):to decrease pain and muscle guarding  MHP to low back for 5 minutes in prone at start of session; no skin irritation noted     Therapeutic Exercises: to develop strength, endurance, range of motion, and flexibility  Initial HEP provided 4/27/2021 (Access Code [email or web address]):  LTR; supine piriformis stretch; supine hip abduction with TB (green TB dispensed)  Added to HEP 5/3/2021: supine quadriceps stretch; supine hip adductor stretch; standing gastroc stretch; standing hip abduction and extension; lateral band walks; supine and standing sciatic nerve glides     Exercises in BOLD performed this date:     Treadmill: 10 minutes, 1.5 mph  LBE: 5 minutes, level 1    Supine:   LTR: 10 reps  Quadriceps stretch: 2 reps with 30 sec holds B  Hip adductor stretch: 2 reps with 30 sec holds B  Piriformis stretch: 2 reps with 30 sec hold, right  Sciatic nerve glides: 2 sets of 10 reps  Hip abduction with green TB: 2 sets of 10 reps    Sidelying:   Clamshells: 10 reps  Hip abduction: 5 reps    Quadruped:    Alt LEs: 5 reps B    Standing:   Hip abduction: 2 sets of 10 reps B  Hip extension: 2 sets of 10 reps B  Lateral band walks with green TB: 10 feet x2 B  Gastroc stretch: 2 reps with 30 second holds B  Sciatic nerve glides: 10 reps  Trunk extension: 2 reps with 15 sec holds     Manual Therapy: for joint mobilization/manipulations and soft tissue mobilization  PA mobs L3-S1, grade 2  STM to right lumbar paraspinals and gluteals/lateral hip in sidelying; pain relief noted     Neuro Re-Education: to improve movement, balance, coordination, kinesthetic sense, posture, and proprioception for sitting or standing balance  None this date     Therapeutic Activities: to improve functional performance, power, or agility  Educated on proper body mechanics and practiced lifting 20# and 40# from elevated surface     Ambulation/Gait Training:  None this date     Activity tolerance and post treatment pain report:   Good; 5/10    Education:  Education was provided to the patient on the following topics: continue HEP in pain free ROM. [x]    No changes were made to the home exercise program.  []    The following changes were made to the home exercise program  Patient verbalized understanding of the topics presented. ASSESSMENT:   Patient presents with continued midline to right sided low back pain. He reports increased pain over the weekend with right radicular symptoms present. Symptoms has remained elevated since his fall. Patient did not perform any of his HEP since last session. Patient tolerated clinic exercises fairly well with rest breaks provided. Patient instructed to attempt exercises performed today in pain free ROM until his MRI and call with questions as needed. He has an MRI scheduled for 5/25/2021. Patients progression toward goals is as follows:  [x]     Improving appropriately and progressing toward goals  []     Improving slowly and progressing toward goals  []     Not making progress toward goals and plan of care will be adjusted    PLAN OF CARE:   Patient continues to benefit from skilled intervention to address the above impairments. [x]    Continue treatment per established plan of care.   []     Recommend the following changes to the plan of care    Recommendations/Intent for next treatment: progress as able    Bambi Cartwright, PT   Time Calculation: 16 mins  Patient Time in clinic:   Start Time: 1000   Stop Time: [phone]

## 2021-05-21 ENCOUNTER — HOSPITAL ENCOUNTER (OUTPATIENT)
Dept: PHYSICAL THERAPY | Age: 51
Discharge: HOME | End: 2021-05-21
Payer: COMMERCIAL

## 2021-05-21 RX ORDER — ALBUTEROL SULFATE 90 UG/1
1 AEROSOL, METERED RESPIRATORY (INHALATION)
Qty: 1 INHALER | Refills: 1 | Status: SHIPPED | OUTPATIENT
Start: 2021-05-21 | End: 2021-07-20 | Stop reason: SDUPTHER

## 2021-06-11 ENCOUNTER — HOSPITAL ENCOUNTER (OUTPATIENT)
Dept: MRI IMAGING | Age: 51
Discharge: HOME | End: 2021-06-11
Attending: NURSE PRACTITIONER
Payer: COMMERCIAL

## 2021-06-11 DIAGNOSIS — M51.36 NARROWING OF LUMBAR INTERVERTEBRAL DISC SPACE: ICD-10-CM

## 2021-06-11 DIAGNOSIS — M54.16 LUMBAR RADICULOPATHY: ICD-10-CM

## 2021-06-11 PROCEDURE — 72148 MRI LUMBAR SPINE W/O DYE: CPT

## 2021-06-24 DIAGNOSIS — J30.89 ENVIRONMENTAL AND SEASONAL ALLERGIES: ICD-10-CM

## 2021-06-25 RX ORDER — MINERAL OIL
ENEMA (ML) RECTAL
Qty: 90 TABLET | Refills: 0 | Status: SHIPPED | OUTPATIENT
Start: 2021-06-25 | End: 2021-10-29

## 2021-06-29 DIAGNOSIS — N52.9 ERECTILE DYSFUNCTION, UNSPECIFIED ERECTILE DYSFUNCTION TYPE: ICD-10-CM

## 2021-07-05 RX ORDER — TADALAFIL 10 MG/1
TABLET ORAL
Qty: 20 TABLET | Refills: 6 | Status: SHIPPED | OUTPATIENT
Start: 2021-07-05 | End: 2021-07-20 | Stop reason: SDUPTHER

## 2021-07-12 NOTE — PROGRESS NOTES
[facility]  [address], [address] [facility]    OUTPATIENT physical Therapy discharge note      7/12/2021:  Patient will be discharged from physical therapy at this time. Criteria for termination of care:    []           Patient has plateaued  []           Patient has not returned to therapy  []           Patient has missed three or more visits without prior notification  [x]           Other: awaiting MRI and follow up with back specialist    Patient was seen for 6 visits from 4/27/21 to 5/19/21. Please refer to the most recent progress note for the latest PT info available. If you need anything further faxed to you, please contact us at [phone].     Thank you for this referral.  Maged Cho, PT

## 2021-07-20 ENCOUNTER — OFFICE VISIT (OUTPATIENT)
Dept: INTERNAL MEDICINE CLINIC | Age: 51
End: 2021-07-20
Payer: COMMERCIAL

## 2021-07-20 VITALS
DIASTOLIC BLOOD PRESSURE: 78 MMHG | RESPIRATION RATE: 18 BRPM | HEIGHT: 75 IN | HEART RATE: 76 BPM | SYSTOLIC BLOOD PRESSURE: 126 MMHG | BODY MASS INDEX: 39.17 KG/M2 | TEMPERATURE: 98.2 F | WEIGHT: 315 LBS | OXYGEN SATURATION: 94 %

## 2021-07-20 DIAGNOSIS — E66.01 OBESITY, CLASS III, BMI 40-49.9 (MORBID OBESITY) (HCC): ICD-10-CM

## 2021-07-20 DIAGNOSIS — E11.9 CONTROLLED TYPE 2 DIABETES MELLITUS WITHOUT COMPLICATION, WITH LONG-TERM CURRENT USE OF INSULIN (HCC): Primary | ICD-10-CM

## 2021-07-20 DIAGNOSIS — E78.2 MIXED HYPERLIPIDEMIA: ICD-10-CM

## 2021-07-20 DIAGNOSIS — I10 ESSENTIAL HYPERTENSION: ICD-10-CM

## 2021-07-20 DIAGNOSIS — Z79.4 CONTROLLED TYPE 2 DIABETES MELLITUS WITHOUT COMPLICATION, WITH LONG-TERM CURRENT USE OF INSULIN (HCC): Primary | ICD-10-CM

## 2021-07-20 DIAGNOSIS — N52.9 ERECTILE DYSFUNCTION, UNSPECIFIED ERECTILE DYSFUNCTION TYPE: ICD-10-CM

## 2021-07-20 DIAGNOSIS — M54.16 LUMBAR RADICULOPATHY: ICD-10-CM

## 2021-07-20 LAB
GLUCOSE POC: 111 MG/DL
HBA1C MFR BLD HPLC: 6.9 %

## 2021-07-20 PROCEDURE — 82962 GLUCOSE BLOOD TEST: CPT | Performed by: NURSE PRACTITIONER

## 2021-07-20 PROCEDURE — 83036 HEMOGLOBIN GLYCOSYLATED A1C: CPT | Performed by: NURSE PRACTITIONER

## 2021-07-20 PROCEDURE — 99214 OFFICE O/P EST MOD 30 MIN: CPT | Performed by: NURSE PRACTITIONER

## 2021-07-20 RX ORDER — LIRAGLUTIDE 6 MG/ML
INJECTION SUBCUTANEOUS
Qty: 45 ML | Refills: 3 | Status: SHIPPED | OUTPATIENT
Start: 2021-07-20 | End: 2022-02-17 | Stop reason: SDUPTHER

## 2021-07-20 RX ORDER — ASPIRIN 81 MG/1
81 TABLET ORAL DAILY
Qty: 90 TABLET | Refills: 1 | Status: SHIPPED | OUTPATIENT
Start: 2021-07-20 | End: 2022-02-17 | Stop reason: SDUPTHER

## 2021-07-20 RX ORDER — ALBUTEROL SULFATE 90 UG/1
1 AEROSOL, METERED RESPIRATORY (INHALATION)
Qty: 1 INHALER | Refills: 1 | Status: SHIPPED | OUTPATIENT
Start: 2021-07-20 | End: 2022-02-17 | Stop reason: SDUPTHER

## 2021-07-20 RX ORDER — IBUPROFEN 800 MG/1
800 TABLET ORAL
Qty: 90 TABLET | Refills: 0 | Status: SHIPPED | OUTPATIENT
Start: 2021-07-20 | End: 2021-07-20 | Stop reason: SDUPTHER

## 2021-07-20 RX ORDER — METFORMIN HYDROCHLORIDE 1000 MG/1
TABLET ORAL
Qty: 180 TABLET | Refills: 1 | Status: SHIPPED | OUTPATIENT
Start: 2021-07-20 | End: 2022-02-17 | Stop reason: SDUPTHER

## 2021-07-20 RX ORDER — ROSUVASTATIN CALCIUM 40 MG/1
40 TABLET, COATED ORAL
Qty: 90 TABLET | Refills: 1 | Status: SHIPPED | OUTPATIENT
Start: 2021-07-20 | End: 2022-02-17 | Stop reason: SDUPTHER

## 2021-07-20 RX ORDER — BLOOD-GLUCOSE,RECEIVER,CONT
1 EACH MISCELLANEOUS DAILY
Qty: 1 EACH | Refills: 0 | Status: SHIPPED | OUTPATIENT
Start: 2021-07-20 | End: 2022-02-17

## 2021-07-20 RX ORDER — PREGABALIN 75 MG/1
150 CAPSULE ORAL
COMMUNITY
Start: 2021-06-25 | End: 2022-02-17

## 2021-07-20 RX ORDER — ENALAPRIL MALEATE AND HYDROCHLOROTHIAZIDE 10; 25 MG/1; MG/1
TABLET ORAL
Qty: 90 TABLET | Refills: 3 | Status: SHIPPED | OUTPATIENT
Start: 2021-07-20 | End: 2022-02-17 | Stop reason: SDUPTHER

## 2021-07-20 RX ORDER — BLOOD-GLUCOSE TRANSMITTER
1 EACH MISCELLANEOUS DAILY
Qty: 1 DEVICE | Refills: 0 | Status: SHIPPED | OUTPATIENT
Start: 2021-07-20 | End: 2022-02-17

## 2021-07-20 RX ORDER — IBUPROFEN 800 MG/1
800 TABLET ORAL
Qty: 90 TABLET | Refills: 0 | Status: SHIPPED | OUTPATIENT
Start: 2021-07-20 | End: 2021-08-12 | Stop reason: SDUPTHER

## 2021-07-20 RX ORDER — TADALAFIL 10 MG/1
TABLET ORAL
Qty: 20 TABLET | Refills: 6 | Status: SHIPPED | OUTPATIENT
Start: 2021-07-20 | End: 2021-11-18 | Stop reason: ALTCHOICE

## 2021-07-20 RX ORDER — PREGABALIN 75 MG/1
CAPSULE ORAL
COMMUNITY
End: 2021-07-20

## 2021-07-20 RX ORDER — BLOOD-GLUCOSE SENSOR
1 EACH MISCELLANEOUS DAILY
Qty: 10 DEVICE | Refills: 11 | Status: SHIPPED | OUTPATIENT
Start: 2021-07-20 | End: 2022-02-17

## 2021-07-20 RX ORDER — INSULIN DETEMIR 100 [IU]/ML
INJECTION, SOLUTION SUBCUTANEOUS
Qty: 45 ML | Refills: 3 | Status: SHIPPED | OUTPATIENT
Start: 2021-07-20 | End: 2022-02-17 | Stop reason: SDUPTHER

## 2021-07-20 NOTE — PROGRESS NOTES
Chief Complaint   Patient presents with    Follow-up     pt states he has to have surgery on his back     Request For New Medication     pain, dexcom     Medication Evaluation     vaseretic- pt states he needs to go back to twice daily        1. Have you been to the ER, urgent care clinic since your last visit? Hospitalized since your last visit? No    2. Have you seen or consulted any other health care providers outside of the [address] [place] [address] since your last visit? Include any pap smears or colon screening.  No

## 2021-07-20 NOTE — PROGRESS NOTES
Subjective: (As above and below)     Chief Complaint   Patient presents with    Follow-up     pt states he has to have surgery on his back     Request For New Medication     pain, dexcom     Medication Evaluation     vaseretic- pt states he needs to go back to twice daily      Jeanie Sapp is a 46y.o. year old male who presents for     Hypertension ROS:  taking medications as instructed, no medication side effects noted, no TIAs, no chest pain on exertion, no dyspnea on exertion, no swelling of ankles    Diabetic Review of Systems - medication compliance: compliant all of the time, diabetic diet compliance: compliant most of the time, home glucose monitoring: is performed regularly. Asking for dexcom instead of freestyle as freestyle falls off arm a lot      Hyperlipidemia tolerating statin        Lumbar radiculopathy: follows w/ ortho- appt Thursday, plan seems to be surgery for lumbar spinal stenosis        Reviewed PmHx, RxHx, FmHx, SocHx, AllgHx and updated in chart.   Family History   Problem Relation Age of Onset    Diabetes Mother         cancer, liver? hep c    Hypertension Mother     Hypertension Father     Diabetes Brother     Hypertension Brother     Hypertension Maternal Uncle     Diabetes Maternal Grandmother     Diabetes Paternal Grandmother     Hypertension Maternal Uncle     Hypertension Maternal Uncle     Diabetes Paternal Uncle        Past Medical History:   Diagnosis Date    Asthma     Diabetes ([facility] [address]     HTN (hypertension) 3/30/2010    Hypertension     Other and unspecified hyperlipidemia 3/30/2010    Sleep apnea     Type II or unspecified type diabetes mellitus without mention of complication, uncontrolled 3/30/2010      Social History     Socioeconomic History    Marital status: SINGLE     Spouse name: Not on file    Number of children: Not on file    Years of education: Not on file    Highest education level: Not on file   Tobacco Use    Smoking status: Current Some Day Smoker     Packs/day: 0.50     Years: 22.00     Pack years: 11.00     Types: Cigars    Smokeless tobacco: Never Used    Tobacco comment: black and mild   Vaping Use    Vaping Use: Never used   Substance and Sexual Activity    Alcohol use: Yes     Alcohol/week: 10.0 standard drinks     Types: 10 Standard drinks or equivalent per week     Comment: Socially     Drug use: No    Sexual activity: Yes     Partners: Female     Birth control/protection: Condom   Social History Narrative    6/6/17: works at [employer] Strain:     Difficulty of Paying Living Expenses:    Food Insecurity:     Worried About [address] in the Last Year:     [address] in the Last Year:    Transportation Needs:     Lack of Transportation (Medical):  Lack of Transportation (Non-Medical):    Physical Activity:     Days of Exercise per Week:     Minutes of Exercise per Session:    Stress:     Feeling of Stress :    Social Connections:     Frequency of Communication with Friends and Family:     Frequency of Social Gatherings with Friends and Family:     Attends [employer] Services:     Active Member of Clubs or Organizations:     Attends Club or Organization Meetings:     Marital Status:           Current Outpatient Medications   Medication Sig    aspirin delayed-release 81 mg tablet Take 1 Tablet by mouth daily.  albuterol (PROVENTIL HFA, VENTOLIN HFA, PROAIR HFA) 90 mcg/actuation inhaler Take 1 Puff by inhalation every six (6) hours as needed for Wheezing.     empagliflozin (Jardiance) 10 mg tablet Take 1 tablet by mouth once daily    enalapril-hydroCHLOROthiazide (VASERETIC) 10-25 mg tablet TAKE 1 TABLET DAILY    insulin detemir U-100 (Levemir FlexTouch U-100 Insuln) 100 unit/mL (3 mL) inpn INJECT 50 UNITS UNDER THE SKIN NIGHTLY    liraglutide (Victoza 3-Demar) 0.6 mg/0.1 mL (18 mg/3 mL) pnij INJECT 1.8 MG UNDER THE SKIN DAILY    metFORMIN (GLUCOPHAGE) 1,000 mg tablet TAKE 1 TABLET BY MOUTH TWICE DAILY WITH MEALS FOR DIABETES( REPLACES JANUMET)    rosuvastatin (CRESTOR) 40 mg tablet Take 1 Tablet by mouth nightly.  tadalafiL (CIALIS) 10 mg tablet TAKE 1 TABLET DAILY AS NEEDED FOR ERECTILE DYSFUNCTION, 30 MINUTES BEFORE INTERCOURSE    pregabalin (LYRICA) 75 mg capsule TAKE 1 CAPSULE(75 MG) BY MOUTH TWICE DAILY    ibuprofen (MOTRIN) 800 mg tablet Take 1 Tablet by mouth every eight (8) hours as needed for Pain. With food    Blood-Glucose Meter,Continuous (Dexcom G6 ) misc 1 Each by Does Not Apply route daily.  Blood-Glucose Sensor (Dexcom G6 Sensor) matt 1 Each by Does Not Apply route daily.  Blood-Glucose Transmitter (Dexcom G6 Transmitter) matt 1 Each by Does Not Apply route daily.  fexofenadine (ALLEGRA) 180 mg tablet TAKE 1 TABLET BY MOUTH DAILY AS NEEDED FOR ALLERGIES    Insulin Needles, Disposable, (BD Ultra-Fine Short Pen Needle) 31 gauge x 5/16\" ndle USE TO INJECT LEVEMIR AND VICTOZA UNDER THE SKIN TWICE DAILY    FreeStyle Giovanni 14 Day Sensor kit USE TO CHECK BLOOD SUGAR AS DIRECTED    FREESTYLE GIOVANNI 14 DAY READER misc USE TO CHECK BLOOD SUGAR ONCE EVERY 2 WEEKS    ONETOUCH ULTRA BLUE TEST STRIP strip CHECK BLOOD SUGAR THREE TIMES DAILY    Insulin Needles, Disposable, 30 gauge x 1/3\" USE TO INJECT LEVEMIR AND VICTOZA TWICE DAILY    Blood-Glucose Meter monitoring kit Check sugars TID. E11.65. Once touch    Lancets misc Use as directed. Dx: e11.65 check sugars TID     No current facility-administered medications for this visit. Review of Systems:   Constitutional:    Negative for fever and chills, negative diaphoresis. HEENT:              Negative for neck pain and stiffness. Eyes:                  Negative for visual disturbance, itching, redness or discharge. Respiratory:        Negative for cough and shortness of breath. Cardiovascular:  Negative for chest pain and palpitations.    Gastrointestinal: Negative for nausea, vomiting, abdominal pain, diarrhea or constipation. Genitourinary:     Negative for dysuria and frequency. Musculoskeletal: Negative for falls, tenderness and swelling. Skin:                    Negative for rash, masses or lesions. Neurological:       Negative for dizzyness, seizure, loss of consciousness, weakness and numbness. Objective:     Vitals:    07/20/21 0913   BP: 126/78   Pulse: 76   Resp: 18   Temp: 98.2 °F (36.8 °C)   TempSrc: Temporal   SpO2: 94%   Weight: 330 lb (149.7 kg)   Height: 6' 3\" (1.905 m)       Results for orders placed or performed in visit on 07/20/21   AMB POC HEMOGLOBIN A1C   Result Value Ref Range    Hemoglobin A1c (POC) 6.9 %   AMB POC GLUCOSE BLOOD, BY GLUCOSE MONITORING DEVICE   Result Value Ref Range    Glucose  MG/DL       Gen: Oriented to person, place and time and well-developed, well-nourished and in no distress. HEENT:    Head: normocephalic and atraumatic. Eyes:  EOM are normal. Pupils equal and round. Neck:  Normal range of motion. Neck supple. Cardiovascular: normal rate, regular rhythm and normal heart sounds. Pulmonary/Chest:  Effort normal and breath sounds normal.  No respiratory distress. No wheezes, no rales. Abdominal: soft, normal  bowel sounds. Musculoskeletal:  No edema, no tenderness. No calf tenderness or edema. Neurological:  Alert, oriented to person, place and time. Skin: skin is warm and dry. Assessment/ Plan:       1.  Controlled type 2 diabetes mellitus without complication, with long-term current use of insulin ([facility])  Improved!  - AMB POC HEMOGLOBIN A1C  - AMB POC GLUCOSE BLOOD, BY GLUCOSE MONITORING DEVICE  - insulin detemir U-100 (Levemir FlexTouch U-100 Insuln) 100 unit/mL (3 mL) inpn; INJECT 50 UNITS UNDER THE SKIN NIGHTLY  Dispense: 45 mL; Refill: 3  - liraglutide (Victoza 3-Demar) 0.6 mg/0.1 mL (18 mg/3 mL) pnij; INJECT 1.8 MG UNDER THE SKIN DAILY  Dispense: 45 mL; Refill: 3  - metFORMIN (GLUCOPHAGE) 1,000 mg tablet; TAKE 1 TABLET BY MOUTH TWICE DAILY WITH MEALS FOR DIABETES( REPLACES JANUMET)  Dispense: 180 Tablet; Refill: 1  - Blood-Glucose Meter,Continuous (Dexcom G6 ) misc; 1 Each by Does Not Apply route daily. Dispense: 1 Each; Refill: 0  - Blood-Glucose Sensor (Dexcom G6 Sensor) matt; 1 Each by Does Not Apply route daily. Dispense: 10 Device; Refill: 11  - Blood-Glucose Transmitter (Dexcom G6 Transmitter) matt; 1 Each by Does Not Apply route daily. Dispense: 1 Device; Refill: 0    2. Obesity, Class III, BMI 40-49.9 (morbid obesity) ([facility] [address]  I have reviewed/discussed the above normal BMI with the patient. I have recommended the following interventions: dietary management education, guidance, and counseling and encourage exercise . Shayla Guerra 3. Essential hypertension    - enalapril-hydroCHLOROthiazide (VASERETIC) 10-25 mg tablet; TAKE 1 TABLET DAILY  Dispense: 90 Tablet; Refill: 3    4. Mixed hyperlipidemia    - aspirin delayed-release 81 mg tablet; Take 1 Tablet by mouth daily. Dispense: 90 Tablet; Refill: 1  - rosuvastatin (CRESTOR) 40 mg tablet; Take 1 Tablet by mouth nightly. Dispense: 90 Tablet; Refill: 1    5. Erectile dysfunction, unspecified erectile dysfunction type  *  - tadalafiL (CIALIS) 10 mg tablet; TAKE 1 TABLET DAILY AS NEEDED FOR ERECTILE DYSFUNCTION, 30 MINUTES BEFORE INTERCOURSE  Dispense: 20 Tablet; Refill: 6    6. Lumbar radiculopathy  Fu sx  - ibuprofen (MOTRIN) 800 mg tablet; Take 1 Tablet by mouth every eight (8) hours as needed for Pain. With food  Dispense: 90 Tablet; Refill: 0        I have discussed the diagnosis with the patient and the intended plan as seen in the above orders. The patient has received an after-visit summary and questions were answered concerning future plans. Pt conveyed understanding of plan.       Medication Side Effects and Warnings were discussed with patient: yes  Patient Labs were reviewed: yes  Patient Past Records were reviewed:  yes    Gurinder York.  Tang Lomeli NP

## 2021-07-20 NOTE — LETTER
NOTIFICATION RETURN TO WORK / SCHOOL    7/20/2021 9:36 AM     [id number]      To Whom It May Concern:    Serena Morrison is currently under the care of Iris Williamson. His A1c is 6.9% today! Blood pressure is 126/78. If there are questions or concerns please have the patient contact our office. Sincerely,      Renetta Montenegro.  Taiwo Blake NP

## 2021-08-12 DIAGNOSIS — M54.16 LUMBAR RADICULOPATHY: ICD-10-CM

## 2021-08-12 RX ORDER — IBUPROFEN 800 MG/1
800 TABLET ORAL
Qty: 90 TABLET | Refills: 0 | Status: SHIPPED | OUTPATIENT
Start: 2021-08-12 | End: 2021-09-24

## 2021-08-12 NOTE — TELEPHONE ENCOUNTER
NP Jean,    Request for 90 d/s for ibuprofen 800mg ([id number]) from [employer]. Please update qty/refills if appropriate. Thanks, Evita    Last Visit: 7/20/21 KATIA Brunner  Next Appointment: 11/18/21 KATIA Brunner  Previous Refill Encounter(s): 7/20/21 90 (mary)    Requested Prescriptions     Pending Prescriptions Disp Refills    ibuprofen (MOTRIN) 800 mg tablet 90 Tablet 0     Sig: Take 1 Tablet by mouth every eight (8) hours as needed for Pain.  With food

## 2021-09-22 DIAGNOSIS — M54.16 LUMBAR RADICULOPATHY: ICD-10-CM

## 2021-09-24 RX ORDER — IBUPROFEN 800 MG/1
TABLET ORAL
Qty: 90 TABLET | Refills: 0 | Status: SHIPPED | OUTPATIENT
Start: 2021-09-24 | End: 2021-10-22

## 2021-10-21 DIAGNOSIS — M54.16 LUMBAR RADICULOPATHY: ICD-10-CM

## 2021-10-22 RX ORDER — IBUPROFEN 800 MG/1
TABLET ORAL
Qty: 90 TABLET | Refills: 0 | Status: SHIPPED | OUTPATIENT
Start: 2021-10-22 | End: 2021-11-18

## 2021-10-29 DIAGNOSIS — J30.89 ENVIRONMENTAL AND SEASONAL ALLERGIES: ICD-10-CM

## 2021-10-29 RX ORDER — FEXOFENADINE HYDROCHLORIDE 180 MG/1
TABLET, FILM COATED ORAL
Qty: 90 TABLET | Refills: 0 | Status: SHIPPED | OUTPATIENT
Start: 2021-10-29 | End: 2021-11-18

## 2021-11-18 ENCOUNTER — OFFICE VISIT (OUTPATIENT)
Dept: INTERNAL MEDICINE CLINIC | Age: 51
End: 2021-11-18
Payer: COMMERCIAL

## 2021-11-18 VITALS
HEART RATE: 75 BPM | RESPIRATION RATE: 18 BRPM | SYSTOLIC BLOOD PRESSURE: 119 MMHG | WEIGHT: 315 LBS | BODY MASS INDEX: 39.17 KG/M2 | OXYGEN SATURATION: 96 % | TEMPERATURE: 98.4 F | DIASTOLIC BLOOD PRESSURE: 79 MMHG | HEIGHT: 75 IN

## 2021-11-18 DIAGNOSIS — M54.16 LUMBAR RADICULOPATHY: ICD-10-CM

## 2021-11-18 DIAGNOSIS — Z79.4 TYPE 2 DIABETES MELLITUS WITH HYPERGLYCEMIA, WITH LONG-TERM CURRENT USE OF INSULIN (HCC): ICD-10-CM

## 2021-11-18 DIAGNOSIS — I10 ESSENTIAL HYPERTENSION: Primary | ICD-10-CM

## 2021-11-18 DIAGNOSIS — N52.9 ERECTILE DYSFUNCTION, UNSPECIFIED ERECTILE DYSFUNCTION TYPE: ICD-10-CM

## 2021-11-18 DIAGNOSIS — E11.65 TYPE 2 DIABETES MELLITUS WITH HYPERGLYCEMIA, WITH LONG-TERM CURRENT USE OF INSULIN (HCC): ICD-10-CM

## 2021-11-18 DIAGNOSIS — Z23 NEEDS FLU SHOT: ICD-10-CM

## 2021-11-18 PROBLEM — Z91.199 PATIENT NON ADHERENCE: Status: RESOLVED | Noted: 2018-01-19 | Resolved: 2021-11-18

## 2021-11-18 LAB
ALBUMIN UR QL STRIP: 30 MG/L
CREATININE, URINE POC: 200 MG/DL
GLUCOSE POC: 146 MG/DL
HBA1C MFR BLD HPLC: 7.4 %
MICROALBUMIN/CREAT RATIO POC: <30 MG/G

## 2021-11-18 PROCEDURE — 83036 HEMOGLOBIN GLYCOSYLATED A1C: CPT | Performed by: NURSE PRACTITIONER

## 2021-11-18 PROCEDURE — 82962 GLUCOSE BLOOD TEST: CPT | Performed by: NURSE PRACTITIONER

## 2021-11-18 PROCEDURE — 99214 OFFICE O/P EST MOD 30 MIN: CPT | Performed by: NURSE PRACTITIONER

## 2021-11-18 PROCEDURE — 90471 IMMUNIZATION ADMIN: CPT | Performed by: INTERNAL MEDICINE

## 2021-11-18 PROCEDURE — 82044 UR ALBUMIN SEMIQUANTITATIVE: CPT | Performed by: NURSE PRACTITIONER

## 2021-11-18 PROCEDURE — 90686 IIV4 VACC NO PRSV 0.5 ML IM: CPT | Performed by: INTERNAL MEDICINE

## 2021-11-18 RX ORDER — IBUPROFEN 800 MG/1
TABLET ORAL
Qty: 90 TABLET | Refills: 0 | Status: SHIPPED | OUTPATIENT
Start: 2021-11-18 | End: 2021-12-16

## 2021-11-18 RX ORDER — SILDENAFIL 100 MG/1
100 TABLET, FILM COATED ORAL
Qty: 20 TABLET | Refills: 2 | Status: SHIPPED | OUTPATIENT
Start: 2021-11-18 | End: 2022-02-17 | Stop reason: SDUPTHER

## 2021-11-18 NOTE — PROGRESS NOTES
Subjective: (As above and below)     Chief Complaint   Patient presents with    Follow-up     pt would like to discuss him taking black seed oil cap pills 2000mg    Medication Evaluation     pt states he would like alternative for cialis, viagra to be sent to [facility] on file  -- pt states insurance will not cover allergy med and would like an alternative      Tevin Humphries is a 46y.o. year old male who presents for     Hypertension ROS:  taking medications as instructed, no medication side effects noted, no TIAs, no chest pain on exertion, no dyspnea on exertion, no swelling of ankles    Diabetic Review of Systems - medication compliance: compliant all of the time, diabetic diet compliance: compliant most of the time, home glucose monitoring: is performed regularly. Eye exam due in Jan  He had a series of prednisone and steroid shot for his back which caused some elevated sugars    Wt Readings from Last 3 Encounters:   11/18/21 342 lb (155.1 kg)   07/20/21 330 lb (149.7 kg)   04/20/21 333 lb (151 kg)         Erectile dysfunction; wants to change back to viagra instead of cialis    Seasonal allergies; asks if there is a rx version        Reviewed PmHx, RxHx, FmHx, SocHx, AllgHx and updated in chart.   Family History   Problem Relation Age of Onset    Diabetes Mother         cancer, liver? hep c    Hypertension Mother     Hypertension Father     Diabetes Brother     Hypertension Brother     Hypertension Maternal Uncle     Diabetes Maternal Grandmother     Diabetes Paternal Grandmother     Hypertension Maternal Uncle     Hypertension Maternal Uncle     Diabetes Paternal Uncle        Past Medical History:   Diagnosis Date    Asthma     Diabetes ([facility] [address]     HTN (hypertension) 3/30/2010    Hypertension     Other and unspecified hyperlipidemia 3/30/2010    Sleep apnea     Type II or unspecified type diabetes mellitus without mention of complication, uncontrolled 3/30/2010      Social History Socioeconomic History    Marital status: SINGLE   Tobacco Use    Smoking status: Current Some Day Smoker     Packs/day: 0.50     Years: 22.00     Pack years: 11.00     Types: Cigars    Smokeless tobacco: Never Used    Tobacco comment: black and mild   Vaping Use    Vaping Use: Never used   Substance and Sexual Activity    Alcohol use: Yes     Alcohol/week: 10.0 standard drinks     Types: 10 Standard drinks or equivalent per week     Comment: Socially     Drug use: No    Sexual activity: Yes     Partners: Female     Birth control/protection: Condom   Social History Narrative    6/6/17: works at [employer]          Current Outpatient Medications   Medication Sig    sildenafil citrate (Viagra) 100 mg tablet Take 1 Tablet by mouth daily as needed for Erectile Dysfunction.  empagliflozin (Jardiance) 10 mg tablet TAKE 1 TABLET DAILY    ibuprofen (MOTRIN) 800 mg tablet TAKE 1 TABLET BY MOUTH EVERY 8 HOURS WITH FOOD AS NEEDED FOR PAIN    albuterol (PROVENTIL HFA, VENTOLIN HFA, PROAIR HFA) 90 mcg/actuation inhaler Take 1 Puff by inhalation every six (6) hours as needed for Wheezing.  enalapril-hydroCHLOROthiazide (VASERETIC) 10-25 mg tablet TAKE 1 TABLET DAILY    insulin detemir U-100 (Levemir FlexTouch U-100 Insuln) 100 unit/mL (3 mL) inpn INJECT 50 UNITS UNDER THE SKIN NIGHTLY    liraglutide (Victoza 3-Demar) 0.6 mg/0.1 mL (18 mg/3 mL) pnij INJECT 1.8 MG UNDER THE SKIN DAILY    metFORMIN (GLUCOPHAGE) 1,000 mg tablet TAKE 1 TABLET BY MOUTH TWICE DAILY WITH MEALS FOR DIABETES( REPLACES JANUMET)    rosuvastatin (CRESTOR) 40 mg tablet Take 1 Tablet by mouth nightly.  pregabalin (LYRICA) 75 mg capsule 150 mg.    aspirin delayed-release 81 mg tablet Take 1 Tablet by mouth daily.  Blood-Glucose Meter,Continuous (Dexcom G6 ) misc 1 Each by Does Not Apply route daily.  Blood-Glucose Sensor (Dexcom G6 Sensor) matt 1 Each by Does Not Apply route daily.     Blood-Glucose Transmitter (Dexcom G6 Transmitter) matt 1 Each by Does Not Apply route daily.  Insulin Needles, Disposable, (BD Ultra-Fine Short Pen Needle) 31 gauge x 5/16\" ndle USE TO INJECT LEVEMIR AND VICTOZA UNDER THE SKIN TWICE DAILY    FreeStyle Giovanni 14 Day Sensor kit USE TO CHECK BLOOD SUGAR AS DIRECTED    FREESTYLE GIOVANNI 14 DAY READER misc USE TO CHECK BLOOD SUGAR ONCE EVERY 2 WEEKS    ONETOUCH ULTRA BLUE TEST STRIP strip CHECK BLOOD SUGAR THREE TIMES DAILY    Insulin Needles, Disposable, 30 gauge x 1/3\" USE TO INJECT LEVEMIR AND VICTOZA TWICE DAILY    Blood-Glucose Meter monitoring kit Check sugars TID. E11.65. Once touch    Lancets misc Use as directed. Dx: e11.65 check sugars TID     No current facility-administered medications for this visit. Review of Systems:   Constitutional:    Negative for fever and chills, negative diaphoresis. HEENT:              Negative for neck pain and stiffness. Eyes:                  Negative for visual disturbance, itching, redness or discharge. Respiratory:        Negative for cough and shortness of breath. Cardiovascular:  Negative for chest pain and palpitations. Gastrointestinal: Negative for nausea, vomiting, abdominal pain, diarrhea or constipation. Genitourinary:     Negative for dysuria and frequency. Musculoskeletal: Negative for falls, tenderness and swelling. Skin:                    Negative for rash, masses or lesions. Neurological:       Negative for dizzyness, seizure, loss of consciousness, weakness and numbness.      Objective:     Vitals:    11/18/21 0911   BP: 119/79   Pulse: 75   Resp: 18   Temp: 98.4 °F (36.9 °C)   TempSrc: Temporal   SpO2: 96%   Weight: 342 lb (155.1 kg)   Height: 6' 3\" (1.905 m)       Results for orders placed or performed in visit on 11/18/21   AMB POC HEMOGLOBIN A1C   Result Value Ref Range    Hemoglobin A1c (POC) 7.4 %   AMB POC GLUCOSE BLOOD, BY GLUCOSE MONITORING DEVICE   Result Value Ref Range    Glucose  MG/DL   AMB POC URINE, MICROALBUMIN, SEMIQUANT (3 RESULTS)   Result Value Ref Range    ALBUMIN, URINE POC 30 Negative mg/L    CREATININE, URINE  mg/dL    Microalbumin/creat ratio (POC) <30 <30 MG/G       Diabetic foot exam:     Left Foot:   Visual Exam: normal    Pulse DP: 2+ (normal)   Filament test: normal sensation    Vibratory sensation: normal      Right Foot:   Visual Exam: normal    Pulse DP: 2+ (normal)   Filament test: normal sensation    Vibratory sensation: normal        Assessment/ Plan:     Follow-up and Dispositions    · Return in about 3 months (around 2/18/2022). Questions black seed oil, discussed supplement, hold for any possible upcoming back sx due to effects of potential bleeding      1. Essential hypertension      2. Erectile dysfunction, unspecified erectile dysfunction type    - sildenafil citrate (Viagra) 100 mg tablet; Take 1 Tablet by mouth daily as needed for Erectile Dysfunction. Dispense: 20 Tablet; Refill: 2    3. Type 2 diabetes mellitus with hyperglycemia, with long-term current use of insulin ([facility])    - AMB POC HEMOGLOBIN A1C  - AMB POC GLUCOSE BLOOD, BY GLUCOSE MONITORING DEVICE  -  DIABETES FOOT EXAM  - AMB POC URINE, MICROALBUMIN, SEMIQUANT (3 RESULTS)    4. Needs flu shot    - INFLUENZA VIRUS VAC QUAD,SPLIT,PRESV FREE SYRINGE IM    5. Lumbar radiculopathy  Follows w/ ortho      I have discussed the diagnosis with the patient and the intended plan as seen in the above orders. The patient has received an after-visit summary and questions were answered concerning future plans. Pt conveyed understanding of plan. Medication Side Effects and Warnings were discussed with patient: yes  Patient Labs were reviewed: yes  Patient Past Records were reviewed:  yes    Kelly Melchor.  Елена Trevino, NP

## 2021-11-18 NOTE — PROGRESS NOTES
Chief Complaint   Patient presents with    Follow-up     pt would like to discuss him taking black seed oil cap pills 2000mg    Medication Evaluation     pt states he would like alternative for cialis, [facility]  -- pt states insurance will not cover allergy med and would like an alternative        1. Have you been to the ER, urgent care clinic since your last visit? Hospitalized since your last visit? No    2. Have you seen or consulted any other health care providers outside of the [address] [place] [address] since your last visit? Include any pap smears or colon screening. No       Pt states he will see Dr. Florentin Ha- neurosurgeon Dec 2nd      Nino Hernandez is a 46 y.o. male who presents for routine immunizations. He denies any symptoms , reactions or allergies that would exclude them from being immunized today. Risks and adverse reactions were discussed and the VIS was given to them. All questions were addressed. He was observed for 10 min post injection. There were no reactions observed.     Yady Zuñiga    Verbal order given by Susan Daly NP

## 2021-11-18 NOTE — PATIENT INSTRUCTIONS
Vaccine Information Statement    Influenza (Flu) Vaccine (Inactivated or Recombinant): What You Need to Know    Many vaccine information statements are available in Yoruba and other languages. See www.immunize.org/vis. Hojas de información sobre vacunas están disponibles en español y en muchos otros idiomas. Visite www.immunize.org/vis. 1. Why get vaccinated? Influenza vaccine can prevent influenza (flu). Flu is a contagious disease that spreads around the [facility] every year, usually between October and May. Anyone can get the flu, but it is more dangerous for some people. Infants and young children, people 72 years and older, pregnant people, and people with certain health conditions or a weakened immune system are at greatest risk of flu complications. Pneumonia, bronchitis, sinus infections, and ear infections are examples of flu-related complications. If you have a medical condition, such as heart disease, cancer, or diabetes, flu can make it worse. Flu can cause fever and chills, sore throat, muscle aches, fatigue, cough, headache, and runny or stuffy nose. Some people may have vomiting and diarrhea, though this is more common in children than adults. In an average year, thousands of people in the [facility] die from flu, and many more are hospitalized. Flu vaccine prevents millions of illnesses and flu-related visits to the doctor each year. 2. Influenza vaccines     CDC recommends everyone 6 months and older get vaccinated every flu season. Children 6 months through 6years of age may need 2 doses during a single flu season. Everyone else needs only 1 dose each flu season. It takes about 2 weeks for protection to develop after vaccination. There are many flu viruses, and they are always changing. Each year a new flu vaccine is made to protect against the influenza viruses believed to be likely to cause disease in the upcoming flu season.  Even when the vaccine doesnt exactly match these viruses, it may still provide some protection. Influenza vaccine does not cause flu. Influenza vaccine may be given at the same time as other vaccines. 3. Talk with your health care provider    Tell your vaccination provider if the person getting the vaccine:   Has had an allergic reaction after a previous dose of influenza vaccine, or has any severe, life-threatening allergies    Has ever had Guillain-Barré Syndrome (also called GBS)    In some cases, your health care provider may decide to postpone influenza vaccination until a future visit. Influenza vaccine can be administered at any time during pregnancy. People who are or will be pregnant during influenza season should receive inactivated influenza vaccine. People with minor illnesses, such as a cold, may be vaccinated. People who are moderately or severely ill should usually wait until they recover before getting influenza vaccine. Your health care provider can give you more information. 4. Risks of a vaccine reaction     Soreness, redness, and swelling where the shot is given, fever, muscle aches, and headache can happen after influenza vaccination.  There may be a very small increased risk of Guillain-Barré Syndrome (GBS) after inactivated influenza vaccine (the flu shot). Harmeet Cohen children who get the flu shot along with pneumococcal vaccine (PCV13) and/or DTaP vaccine at the same time might be slightly more likely to have a seizure caused by fever. Tell your health care provider if a child who is getting flu vaccine has ever had a seizure. People sometimes faint after medical procedures, including vaccination. Tell your provider if you feel dizzy or have vision changes or ringing in the ears. As with any medicine, there is a very remote chance of a vaccine causing a severe allergic reaction, other serious injury, or death. 5. What if there is a serious problem?     An allergic reaction could occur after the vaccinated person leaves the clinic. If you see signs of a severe allergic reaction (hives, swelling of the face and throat, difficulty breathing, a fast heartbeat, dizziness, or weakness), call 9-1-1 and get the person to the nearest hospital.    For other signs that concern you, call your health care provider. Adverse reactions should be reported to the Vaccine Adverse Event Reporting System (VAERS). Your health care provider will usually file this report, or you can do it yourself. Visit the VAERS website at www.vaers. [facility].gov or call 0-340.551.1360. VAERS is only for reporting reactions, and VAERS staff members do not give medical advice. 6. The National Vaccine Injury Compensation Program    The [facility] Vaccine Injury Compensation Program (VICP) is a federal program that was created to compensate people who may have been injured by certain vaccines. Claims regarding alleged injury or death due to vaccination have a time limit for filing, which may be as short as two years. Visit the VICP website at www.[facility].gov/vaccinecompensation or call 1-850.936.5078 to learn about the program and about filing a claim. 7. How can I learn more?  Ask your health care provider.  Call your local or state health department.  Visit the website of the Food and Drug Administration (FDA) for vaccine package inserts and additional information at www.fda.gov/vaccines-blood-biologics/vaccines.  Contact the Centers for Disease Control and Prevention (CDC):  - Call 1-855.186.9190 (5-657-YPC-INFO) or  - Visit CDCs influenza website at www.cdc.gov/flu. Vaccine Information Statement   Inactivated Influenza Vaccine   8/6/2021  [address] [id number]   Department of Health and Human Services  Centers for Disease Control and Prevention    Office Use Only

## 2021-12-16 DIAGNOSIS — M54.16 LUMBAR RADICULOPATHY: ICD-10-CM

## 2021-12-16 RX ORDER — IBUPROFEN 800 MG/1
TABLET ORAL
Qty: 90 TABLET | Refills: 0 | Status: SHIPPED | OUTPATIENT
Start: 2021-12-16 | End: 2022-01-03

## 2022-01-02 DIAGNOSIS — M54.16 LUMBAR RADICULOPATHY: ICD-10-CM

## 2022-01-03 RX ORDER — IBUPROFEN 800 MG/1
TABLET ORAL
Qty: 90 TABLET | Refills: 0 | Status: SHIPPED | OUTPATIENT
Start: 2022-01-03 | End: 2022-02-11 | Stop reason: SDUPTHER

## 2022-02-11 DIAGNOSIS — M54.16 LUMBAR RADICULOPATHY: ICD-10-CM

## 2022-02-11 RX ORDER — IBUPROFEN 800 MG/1
TABLET ORAL
Qty: 90 TABLET | Refills: 0 | Status: SHIPPED | OUTPATIENT
Start: 2022-02-11 | End: 2022-02-17 | Stop reason: SDUPTHER

## 2022-02-17 ENCOUNTER — OFFICE VISIT (OUTPATIENT)
Dept: INTERNAL MEDICINE CLINIC | Age: 52
End: 2022-02-17
Payer: COMMERCIAL

## 2022-02-17 VITALS
WEIGHT: 315 LBS | RESPIRATION RATE: 18 BRPM | SYSTOLIC BLOOD PRESSURE: 129 MMHG | HEIGHT: 75 IN | DIASTOLIC BLOOD PRESSURE: 65 MMHG | TEMPERATURE: 98.7 F | OXYGEN SATURATION: 97 % | HEART RATE: 83 BPM | BODY MASS INDEX: 39.17 KG/M2

## 2022-02-17 DIAGNOSIS — E78.2 MIXED HYPERLIPIDEMIA: Primary | ICD-10-CM

## 2022-02-17 DIAGNOSIS — I10 ESSENTIAL HYPERTENSION: ICD-10-CM

## 2022-02-17 DIAGNOSIS — E66.01 OBESITY, CLASS III, BMI 40-49.9 (MORBID OBESITY) (HCC): ICD-10-CM

## 2022-02-17 DIAGNOSIS — N52.9 ERECTILE DYSFUNCTION, UNSPECIFIED ERECTILE DYSFUNCTION TYPE: ICD-10-CM

## 2022-02-17 DIAGNOSIS — M54.16 LUMBAR RADICULOPATHY: ICD-10-CM

## 2022-02-17 DIAGNOSIS — Z79.4 UNCONTROLLED TYPE 2 DIABETES MELLITUS WITH HYPERGLYCEMIA, WITH LONG-TERM CURRENT USE OF INSULIN (HCC): ICD-10-CM

## 2022-02-17 DIAGNOSIS — E11.65 UNCONTROLLED TYPE 2 DIABETES MELLITUS WITH HYPERGLYCEMIA, WITH LONG-TERM CURRENT USE OF INSULIN (HCC): ICD-10-CM

## 2022-02-17 LAB
GLUCOSE POC: 133 MG/DL
HBA1C MFR BLD HPLC: 8.3 %

## 2022-02-17 PROCEDURE — 99214 OFFICE O/P EST MOD 30 MIN: CPT | Performed by: NURSE PRACTITIONER

## 2022-02-17 PROCEDURE — 82962 GLUCOSE BLOOD TEST: CPT | Performed by: NURSE PRACTITIONER

## 2022-02-17 PROCEDURE — 83036 HEMOGLOBIN GLYCOSYLATED A1C: CPT | Performed by: NURSE PRACTITIONER

## 2022-02-17 RX ORDER — LIRAGLUTIDE 6 MG/ML
INJECTION SUBCUTANEOUS
Qty: 45 ML | Refills: 3 | Status: SHIPPED | OUTPATIENT
Start: 2022-02-17 | End: 2022-05-19 | Stop reason: SDUPTHER

## 2022-02-17 RX ORDER — SILDENAFIL 100 MG/1
100 TABLET, FILM COATED ORAL
Qty: 20 TABLET | Refills: 2 | Status: SHIPPED | OUTPATIENT
Start: 2022-02-17

## 2022-02-17 RX ORDER — ASPIRIN 81 MG/1
81 TABLET ORAL DAILY
Qty: 90 TABLET | Refills: 1 | Status: SHIPPED | OUTPATIENT
Start: 2022-02-17 | End: 2022-05-19 | Stop reason: SDUPTHER

## 2022-02-17 RX ORDER — METFORMIN HYDROCHLORIDE 1000 MG/1
TABLET ORAL
Qty: 180 TABLET | Refills: 1 | Status: SHIPPED | OUTPATIENT
Start: 2022-02-17 | End: 2022-05-19 | Stop reason: SDUPTHER

## 2022-02-17 RX ORDER — IBUPROFEN 800 MG/1
TABLET ORAL
Qty: 90 TABLET | Refills: 0 | Status: SHIPPED | OUTPATIENT
Start: 2022-02-17 | End: 2022-03-18

## 2022-02-17 RX ORDER — ENALAPRIL MALEATE AND HYDROCHLOROTHIAZIDE 10; 25 MG/1; MG/1
TABLET ORAL
Qty: 90 TABLET | Refills: 3 | Status: SHIPPED | OUTPATIENT
Start: 2022-02-17 | End: 2022-05-19 | Stop reason: SDUPTHER

## 2022-02-17 RX ORDER — PREGABALIN 75 MG/1
150 CAPSULE ORAL
Status: CANCELLED | OUTPATIENT
Start: 2022-02-17

## 2022-02-17 RX ORDER — ROSUVASTATIN CALCIUM 40 MG/1
40 TABLET, COATED ORAL
Qty: 90 TABLET | Refills: 1 | Status: SHIPPED | OUTPATIENT
Start: 2022-02-17 | End: 2022-05-19 | Stop reason: SDUPTHER

## 2022-02-17 RX ORDER — ALBUTEROL SULFATE 90 UG/1
1 AEROSOL, METERED RESPIRATORY (INHALATION)
Qty: 1 EACH | Refills: 2 | Status: SHIPPED | OUTPATIENT
Start: 2022-02-17 | End: 2022-06-02

## 2022-02-17 RX ORDER — PREGABALIN 150 MG/1
150 CAPSULE ORAL 2 TIMES DAILY
Qty: 60 CAPSULE | Refills: 2 | Status: SHIPPED | OUTPATIENT
Start: 2022-02-17 | End: 2022-05-19

## 2022-02-17 RX ORDER — INSULIN DETEMIR 100 [IU]/ML
INJECTION, SOLUTION SUBCUTANEOUS
Qty: 45 ML | Refills: 3 | Status: SHIPPED | OUTPATIENT
Start: 2022-02-17 | End: 2022-05-19 | Stop reason: SDUPTHER

## 2022-02-17 RX ORDER — LANCETS
EACH MISCELLANEOUS
Qty: 1 EACH | Refills: 11 | Status: SHIPPED | OUTPATIENT
Start: 2022-02-17

## 2022-02-17 RX ORDER — INSULIN PUMP SYRINGE, 3 ML
EACH MISCELLANEOUS
Qty: 1 KIT | Refills: 0 | Status: SHIPPED | OUTPATIENT
Start: 2022-02-17

## 2022-02-17 NOTE — PROGRESS NOTES
Subjective: (As above and below)     Chief Complaint   Patient presents with   Surjit Castro is a 46y.o. year old male who presents for     Hypertension ROS:  taking medications as instructed, no medication side effects noted, no TIAs, no chest pain on exertion, no dyspnea on exertion, no swelling of ankles    Diabetic Review of Systems - medication compliance: compliant most of the time, diabetic diet compliance: noncompliant some of the time, home glucose monitoring: is performed regularly. He states he had a lot of meals cheats at holidays and has missed some doeses    Sleep apnea: wearing cpap    Hyperlipidemia: tolerating statin    Lumbar radiculopathy: deferring surgery until he is able to lose approx 20 pounds  He has not been exercising due to weather and back pain but has access to a gym and plans on doing so    Wt Readings from Last 3 Encounters:   02/17/22 (!) 354 lb (160.6 kg)   11/18/21 342 lb (155.1 kg)   07/20/21 330 lb (149.7 kg)           Reviewed PmHx, RxHx, FmHx, SocHx, AllgHx and updated in chart.   Family History   Problem Relation Age of Onset    Diabetes Mother         cancer, liver? hep c    Hypertension Mother     Hypertension Father     Diabetes Brother     Hypertension Brother     Hypertension Maternal Uncle     Diabetes Maternal Grandmother     Diabetes Paternal Grandmother     Hypertension Maternal Uncle     Hypertension Maternal Uncle     Diabetes Paternal Uncle        Past Medical History:   Diagnosis Date    Asthma     Diabetes ([facility] [address]     HTN (hypertension) 3/30/2010    Hypertension     Other and unspecified hyperlipidemia 3/30/2010    Sleep apnea     Type II or unspecified type diabetes mellitus without mention of complication, uncontrolled 3/30/2010      Social History     Socioeconomic History    Marital status: SINGLE   Tobacco Use    Smoking status: Current Some Day Smoker     Packs/day: 0.50     Years: 22.00     Pack years: 11.00     Types: Cigars    Smokeless tobacco: Never Used    Tobacco comment: black and mild   Vaping Use    Vaping Use: Never used   Substance and Sexual Activity    Alcohol use: Yes     Alcohol/week: 10.0 standard drinks     Types: 10 Standard drinks or equivalent per week     Comment: Socially     Drug use: No    Sexual activity: Yes     Partners: Female     Birth control/protection: Condom   Social History Narrative    6/6/17: works at [employer]          Current Outpatient Medications   Medication Sig    ibuprofen (MOTRIN) 800 mg tablet TAKE 1 TABLET BY MOUTH EVERY 8 HOURS WITH FOOD AS NEEDED FOR PAIN    sildenafil citrate (Viagra) 100 mg tablet Take 1 Tablet by mouth daily as needed for Erectile Dysfunction.  empagliflozin (Jardiance) 10 mg tablet TAKE 1 TABLET DAILY    aspirin delayed-release 81 mg tablet Take 1 Tablet by mouth daily.  albuterol (PROVENTIL HFA, VENTOLIN HFA, PROAIR HFA) 90 mcg/actuation inhaler Take 1 Puff by inhalation every six (6) hours as needed for Wheezing.  enalapril-hydroCHLOROthiazide (VASERETIC) 10-25 mg tablet TAKE 1 TABLET DAILY    insulin detemir U-100 (Levemir FlexTouch U-100 Insuln) 100 unit/mL (3 mL) inpn INJECT 50 UNITS UNDER THE SKIN NIGHTLY    liraglutide (Victoza 3-Demar) 0.6 mg/0.1 mL (18 mg/3 mL) pnij INJECT 1.8 MG UNDER THE SKIN DAILY    metFORMIN (GLUCOPHAGE) 1,000 mg tablet TAKE 1 TABLET BY MOUTH TWICE DAILY WITH MEALS FOR DIABETES( REPLACES JANUMET)    rosuvastatin (CRESTOR) 40 mg tablet Take 1 Tablet by mouth nightly.  pregabalin (LYRICA) 75 mg capsule 150 mg.    Blood-Glucose Meter,Continuous (Dexcom G6 ) misc 1 Each by Does Not Apply route daily.  Blood-Glucose Sensor (Dexcom G6 Sensor) matt 1 Each by Does Not Apply route daily.  Blood-Glucose Transmitter (Dexcom G6 Transmitter) matt 1 Each by Does Not Apply route daily.     Insulin Needles, Disposable, (BD Ultra-Fine Short Pen Needle) 31 gauge x 5/16\" ndle USE TO INJECT LEVEMIR AND VICTOZA UNDER THE SKIN TWICE DAILY    FreeStyle Giovanni 14 Day Sensor kit USE TO CHECK BLOOD SUGAR AS DIRECTED    FREESTYLE GIOVANNI 14 DAY READER misc USE TO CHECK BLOOD SUGAR ONCE EVERY 2 WEEKS    ONETOUCH ULTRA BLUE TEST STRIP strip CHECK BLOOD SUGAR THREE TIMES DAILY    Insulin Needles, Disposable, 30 gauge x 1/3\" USE TO INJECT LEVEMIR AND VICTOZA TWICE DAILY    Blood-Glucose Meter monitoring kit Check sugars TID. E11.65. Once touch    Lancets misc Use as directed. Dx: e11.65 check sugars TID     No current facility-administered medications for this visit. Review of Systems:   Constitutional:    Negative for fever and chills, negative diaphoresis. HEENT:              Negative for neck pain and stiffness. Eyes:                  Negative for visual disturbance, itching, redness or discharge. Respiratory:        Negative for cough and shortness of breath. Cardiovascular:  Negative for chest pain and palpitations. Gastrointestinal: Negative for nausea, vomiting, abdominal pain, diarrhea or constipation. Genitourinary:     Negative for dysuria and frequency. Musculoskeletal: Negative for falls, tenderness and swelling. Skin:                    Negative for rash, masses or lesions. Neurological:       Negative for dizzyness, seizure, loss of consciousness, weakness and numbness. Objective:     Vitals:    02/17/22 0842   BP: 129/65   Pulse: 83   Resp: 18   Temp: 98.7 °F (37.1 °C)   TempSrc: Temporal   SpO2: 97%   Weight: (!) 354 lb (160.6 kg)   Height: 6' 3\" (1.905 m)       Results for orders placed or performed in visit on 02/17/22   AMB POC HEMOGLOBIN A1C   Result Value Ref Range    Hemoglobin A1c (POC) 8.3 %   AMB POC GLUCOSE BLOOD, BY GLUCOSE MONITORING DEVICE   Result Value Ref Range    Glucose  MG/DL       Gen: Oriented to person, place and time and well-developed, well-nourished and in no distress. HEENT:    Head: normocephalic and atraumatic.     Eyes:  EOM are normal. Pupils equal and round.    Neck:  Normal range of motion. Neck supple. Cardiovascular: normal rate, regular rhythm and normal heart sounds. Pulmonary/Chest:  Effort normal and breath sounds normal.  No respiratory distress. No wheezes, no rales. Abdominal: soft, normal  bowel sounds. Musculoskeletal:  No edema, no tenderness. No calf tenderness or edema. Neurological:  Alert, oriented to person, place and time. Skin: skin is warm and dry. Assessment/ Plan:      1. Mixed hyperlipidemia    - aspirin delayed-release 81 mg tablet; Take 1 Tablet by mouth daily. Dispense: 90 Tablet; Refill: 1  - rosuvastatin (CRESTOR) 40 mg tablet; Take 1 Tablet by mouth nightly. Dispense: 90 Tablet; Refill: 1    2. Essential hypertension    - enalapril-hydroCHLOROthiazide (VASERETIC) 10-25 mg tablet; TAKE 1 TABLET DAILY  Dispense: 90 Tablet; Refill: 3    3. Obesity, Class III, BMI 40-49.9 (morbid obesity) ([facility] [address]  recc swimming if able    4. Lumbar radiculopathy    - ibuprofen (MOTRIN) 800 mg tablet; TAKE 1 TABLET BY MOUTH EVERY 8 HOURS WITH FOOD AS NEEDED FOR PAIN  Dispense: 90 Tablet; Refill: 0  - pregabalin (LYRICA) 150 mg capsule; Take 1 Capsule by mouth two (2) times a day. Max Daily Amount: 300 mg. Dispense: 60 Capsule; Refill: 2    5. Erectile dysfunction, unspecified erectile dysfunction type    - sildenafil citrate (Viagra) 100 mg tablet; Take 1 Tablet by mouth daily as needed for Erectile Dysfunction. Dispense: 20 Tablet; Refill: 2    6. Uncontrolled type 2 diabetes mellitus with hyperglycemia, with long-term current use of insulin (HCC)  No med changed today, take meds daily, diet/exercise  mychart message if increasingly high readings  - AMB POC HEMOGLOBIN A1C  - AMB POC GLUCOSE BLOOD, BY GLUCOSE MONITORING DEVICE  - metFORMIN (GLUCOPHAGE) 1,000 mg tablet; TAKE 1 TABLET BY MOUTH TWICE DAILY WITH MEALS FOR DIABETES( REPLACES JANUMET)  Dispense: 180 Tablet;  Refill: 1  - liraglutide (Victoza 3-Demar) 0.6 mg/0.1 mL (18 mg/3 mL) pnij; INJECT 1.8 MG UNDER THE SKIN DAILY  Dispense: 45 mL; Refill: 3  - insulin detemir U-100 (Levemir FlexTouch U-100 Insuln) 100 unit/mL (3 mL) inpn; INJECT 50 UNITS UNDER THE SKIN NIGHTLY  Dispense: 45 mL; Refill: 3  - Blood-Glucose Meter monitoring kit; Check sugars TID. E11.65. Once touch  Dispense: 1 Kit; Refill: 0  - lancets misc; Use as directed. Dx: e11.65 check sugars TID  Dispense: 1 Each; Refill: 11        I have discussed the diagnosis with the patient and the intended plan as seen in the above orders. The patient has received an after-visit summary and questions were answered concerning future plans. Pt conveyed understanding of plan. Medication Side Effects and Warnings were discussed with patient: yes  Patient Labs were reviewed: yes  Patient Past Records were reviewed:  yes    Elis Hawthorne.  Enedelia Decker NP

## 2022-02-17 NOTE — PROGRESS NOTES
Chief Complaint   Patient presents with    Follow-up       1. Have you been to the ER, urgent care clinic since your last visit? Hospitalized since your last visit? No    2. Have you seen or consulted any other health care providers outside of the [address] [place] [address] since your last visit? Include any pap smears or colon screening.  No

## 2022-03-18 DIAGNOSIS — M54.16 LUMBAR RADICULOPATHY: ICD-10-CM

## 2022-03-18 RX ORDER — IBUPROFEN 800 MG/1
TABLET ORAL
Qty: 90 TABLET | Refills: 0 | Status: SHIPPED | OUTPATIENT
Start: 2022-03-18 | End: 2022-05-19 | Stop reason: SDUPTHER

## 2022-03-19 PROBLEM — E66.01 OBESITY, MORBID (HCC): Status: ACTIVE | Noted: 2018-01-19

## 2022-03-19 PROBLEM — R79.89 LOW VITAMIN D LEVEL: Status: ACTIVE | Noted: 2019-05-31

## 2022-03-20 PROBLEM — F17.200 SMOKER: Status: ACTIVE | Noted: 2018-12-17

## 2022-03-20 PROBLEM — G47.30 SLEEP APNEA: Status: ACTIVE | Noted: 2017-09-11

## 2022-05-19 ENCOUNTER — OFFICE VISIT (OUTPATIENT)
Dept: INTERNAL MEDICINE CLINIC | Age: 52
End: 2022-05-19
Payer: COMMERCIAL

## 2022-05-19 VITALS
RESPIRATION RATE: 18 BRPM | TEMPERATURE: 98.3 F | HEIGHT: 75 IN | SYSTOLIC BLOOD PRESSURE: 119 MMHG | BODY MASS INDEX: 39.17 KG/M2 | OXYGEN SATURATION: 96 % | DIASTOLIC BLOOD PRESSURE: 62 MMHG | WEIGHT: 315 LBS | HEART RATE: 88 BPM

## 2022-05-19 DIAGNOSIS — E78.2 MIXED HYPERLIPIDEMIA: ICD-10-CM

## 2022-05-19 DIAGNOSIS — G47.33 OSA (OBSTRUCTIVE SLEEP APNEA): ICD-10-CM

## 2022-05-19 DIAGNOSIS — Z23 ENCOUNTER FOR IMMUNIZATION: ICD-10-CM

## 2022-05-19 DIAGNOSIS — Z79.4 TYPE 2 DIABETES MELLITUS WITH HYPERGLYCEMIA, WITH LONG-TERM CURRENT USE OF INSULIN (HCC): Primary | ICD-10-CM

## 2022-05-19 DIAGNOSIS — E11.65 TYPE 2 DIABETES MELLITUS WITH HYPERGLYCEMIA, WITH LONG-TERM CURRENT USE OF INSULIN (HCC): Primary | ICD-10-CM

## 2022-05-19 DIAGNOSIS — R35.1 NOCTURIA: ICD-10-CM

## 2022-05-19 DIAGNOSIS — I10 ESSENTIAL HYPERTENSION: ICD-10-CM

## 2022-05-19 DIAGNOSIS — M54.16 LUMBAR RADICULOPATHY: ICD-10-CM

## 2022-05-19 LAB
CHOLEST SERPL-MCNC: <100 MG/DL
GLUCOSE POC: 155 MG/DL
HBA1C MFR BLD HPLC: 8.1 %
HDLC SERPL-MCNC: 31 MG/DL
LDL CHOLESTEROL POC: NORMAL MG/DL
NON-HDL GOAL (POC): NORMAL
TCHOL/HDL RATIO (POC): NORMAL
TRIGL SERPL-MCNC: 120 MG/DL

## 2022-05-19 PROCEDURE — 82962 GLUCOSE BLOOD TEST: CPT | Performed by: NURSE PRACTITIONER

## 2022-05-19 PROCEDURE — 99214 OFFICE O/P EST MOD 30 MIN: CPT | Performed by: NURSE PRACTITIONER

## 2022-05-19 PROCEDURE — 80061 LIPID PANEL: CPT | Performed by: NURSE PRACTITIONER

## 2022-05-19 PROCEDURE — 83036 HEMOGLOBIN GLYCOSYLATED A1C: CPT | Performed by: NURSE PRACTITIONER

## 2022-05-19 PROCEDURE — 90677 PCV20 VACCINE IM: CPT | Performed by: NURSE PRACTITIONER

## 2022-05-19 PROCEDURE — 90471 IMMUNIZATION ADMIN: CPT | Performed by: NURSE PRACTITIONER

## 2022-05-19 RX ORDER — IBUPROFEN 800 MG/1
800 TABLET ORAL
Qty: 90 TABLET | Refills: 0 | Status: SHIPPED | OUTPATIENT
Start: 2022-05-19

## 2022-05-19 RX ORDER — ASPIRIN 81 MG/1
81 TABLET ORAL DAILY
Qty: 90 TABLET | Refills: 1 | Status: SHIPPED | OUTPATIENT
Start: 2022-05-19

## 2022-05-19 RX ORDER — ENALAPRIL MALEATE AND HYDROCHLOROTHIAZIDE 10; 25 MG/1; MG/1
TABLET ORAL
Qty: 90 TABLET | Refills: 3 | Status: SHIPPED | OUTPATIENT
Start: 2022-05-19

## 2022-05-19 RX ORDER — BLOOD-GLUCOSE METER
EACH MISCELLANEOUS
COMMUNITY
Start: 2022-02-23

## 2022-05-19 RX ORDER — INSULIN DETEMIR 100 [IU]/ML
INJECTION, SOLUTION SUBCUTANEOUS
Qty: 45 ML | Refills: 3 | Status: SHIPPED | OUTPATIENT
Start: 2022-05-19

## 2022-05-19 RX ORDER — PREGABALIN 100 MG/1
CAPSULE ORAL
COMMUNITY
Start: 2022-02-19 | End: 2022-05-19

## 2022-05-19 RX ORDER — LIRAGLUTIDE 6 MG/ML
INJECTION SUBCUTANEOUS
Qty: 45 ML | Refills: 3 | Status: SHIPPED | OUTPATIENT
Start: 2022-05-19

## 2022-05-19 RX ORDER — LANCETS 33 GAUGE
EACH MISCELLANEOUS
COMMUNITY
Start: 2022-02-17

## 2022-05-19 RX ORDER — ROSUVASTATIN CALCIUM 40 MG/1
40 TABLET, COATED ORAL
Qty: 90 TABLET | Refills: 1 | Status: SHIPPED | OUTPATIENT
Start: 2022-05-19 | End: 2022-10-24

## 2022-05-19 RX ORDER — PREGABALIN 200 MG/1
CAPSULE ORAL
COMMUNITY
Start: 2022-05-02 | End: 2022-07-08 | Stop reason: SDUPTHER

## 2022-05-19 RX ORDER — METFORMIN HYDROCHLORIDE 1000 MG/1
TABLET ORAL
Qty: 180 TABLET | Refills: 1 | Status: SHIPPED | OUTPATIENT
Start: 2022-05-19 | End: 2022-10-24

## 2022-05-19 NOTE — PROGRESS NOTES
Chief Complaint   Patient presents with    Follow-up     3 month     Leg Swelling     right leg x 2-3 weeks, swelling comes after standing for long periods of time     Medication Refill     [facility]        1. \"Have you been to the ER, urgent care clinic since your last visit? Hospitalized since your last visit? \" No    2. \"Have you seen or consulted any other health care providers outside of the [address] [place] [address] since your last visit? \" No     3. For patients aged 39-70: Has the patient had a colonoscopy / FIT/ Cologuard? Yes - Care Gap present. Most recent result on file      If the patient is female:    4. For patients aged 41-77: Has the patient had a mammogram within the past 2 years? NA - based on age or sex      11. For patients aged 21-65: Has the patient had a pap smear?  NA - based on age or sex

## 2022-05-19 NOTE — PROGRESS NOTES
Oleg Perez is a 46 y.o. male who presents for routine immunizations. He denies any symptoms , reactions or allergies that would exclude them from being immunized today. Risks and adverse reactions were discussed and the VIS was given to them. All questions were addressed. He was observed for 10 min post injection. There were no reactions observed.     Yady Zuñiga    Verbal order given by Beth Madsen NP

## 2022-05-19 NOTE — PROGRESS NOTES
Subjective: (As above and below)     Chief Complaint   Patient presents with    Follow-up     3 month     Leg Swelling     right leg x 2-3 weeks, swelling comes after standing for long periods of time     Medication Refill     [facility]      Gunnar Aceves is a 46y.o. year old male who presents for     Hypertension ROS:  taking medications as instructed, no medication side effects noted, no TIAs, no chest pain on exertion, no dyspnea on exertion, he wears compression stockings, he notes some occ swelling to R thigh after standing for long periods of time but also when he eats more salty foods  No sob, cp    Diabetic Review of Systems - medication compliance: compliant all of the time, diabetic diet compliance: compliant most of the time, home glucose monitoring: is not performed. Admits to cheats w/ starchy foods but feel motivated to get back on track    A1c is improving some    Eye exam: utd    FOSTER: wearing cpap    Weight    Wt Readings from Last 3 Encounters:   05/19/22 350 lb (158.8 kg)   02/17/22 (!) 354 lb (160.6 kg)   11/18/21 342 lb (155.1 kg)       Hx of spinal stenosis: follows w/ ortho, on lyrica      Reviewed PmHx, RxHx, FmHx, SocHx, AllgHx and updated in chart.   Family History   Problem Relation Age of Onset    Diabetes Mother         cancer, liver? hep c    Hypertension Mother     Hypertension Father     Diabetes Brother     Hypertension Brother     Hypertension Maternal Uncle     Diabetes Maternal Grandmother     Diabetes Paternal Grandmother     Hypertension Maternal Uncle     Hypertension Maternal Uncle     Diabetes Paternal Uncle        Past Medical History:   Diagnosis Date    Asthma     Diabetes ([facility] [address]     HTN (hypertension) 3/30/2010    Hypertension     Other and unspecified hyperlipidemia 3/30/2010    Sleep apnea     Type II or unspecified type diabetes mellitus without mention of complication, uncontrolled 3/30/2010      Social History     Socioeconomic History    Marital status: SINGLE   Tobacco Use    Smoking status: Current Some Day Smoker     Packs/day: 0.50     Years: 22.00     Pack years: 11.00     Types: Cigars    Smokeless tobacco: Never Used    Tobacco comment: black and mild   Vaping Use    Vaping Use: Never used   Substance and Sexual Activity    Alcohol use: Yes     Alcohol/week: 10.0 standard drinks     Types: 10 Standard drinks or equivalent per week     Comment: Socially     Drug use: No    Sexual activity: Yes     Partners: Female     Birth control/protection: Condom   Social History Narrative    6/6/17: works at [employer]          Current Outpatient Medications   Medication Sig    ibuprofen (MOTRIN) 800 mg tablet TAKE 1 TABLET BY MOUTH EVERY 8 HOURS WITH FOOD AS NEEDED FOR PAIN    albuterol (PROVENTIL HFA, VENTOLIN HFA, PROAIR HFA) 90 mcg/actuation inhaler Take 1 Puff by inhalation every six (6) hours as needed for Wheezing.  aspirin delayed-release 81 mg tablet Take 1 Tablet by mouth daily.  empagliflozin (Jardiance) 10 mg tablet TAKE 1 TABLET DAILY    enalapril-hydroCHLOROthiazide (VASERETIC) 10-25 mg tablet TAKE 1 TABLET DAILY    sildenafil citrate (Viagra) 100 mg tablet Take 1 Tablet by mouth daily as needed for Erectile Dysfunction.  rosuvastatin (CRESTOR) 40 mg tablet Take 1 Tablet by mouth nightly.  metFORMIN (GLUCOPHAGE) 1,000 mg tablet TAKE 1 TABLET BY MOUTH TWICE DAILY WITH MEALS FOR DIABETES( REPLACES JANUMET)    liraglutide (Victoza 3-Demar) 0.6 mg/0.1 mL (18 mg/3 mL) pnij INJECT 1.8 MG UNDER THE SKIN DAILY    insulin detemir U-100 (Levemir FlexTouch U-100 Insuln) 100 unit/mL (3 mL) inpn INJECT 50 UNITS UNDER THE SKIN NIGHTLY    pregabalin (LYRICA) 150 mg capsule Take 1 Capsule by mouth two (2) times a day. Max Daily Amount: 300 mg.  Blood-Glucose Meter monitoring kit Check sugars TID. E11.65. Once touch    lancets misc Use as directed.  Dx: e11.65 check sugars TID    glucose blood VI test strips (ASCENSIA AUTODISC VI, ONE TOUCH ULTRA TEST VI) strip E11.65 check sugar daily    Insulin Needles, Disposable, (BD Ultra-Fine Short Pen Needle) 31 gauge x 5/16\" ndle USE TO INJECT LEVEMIR AND VICTOZA UNDER THE SKIN TWICE DAILY    Insulin Needles, Disposable, 30 gauge x 1/3\" USE TO INJECT LEVEMIR AND [address]     No current facility-administered medications for this visit. Review of Systems:   Constitutional:    Negative for fever and chills, negative diaphoresis. HEENT:              Negative for neck pain and stiffness. Eyes:                  Negative for visual disturbance, itching, redness or discharge. Respiratory:        Negative for cough and shortness of breath. Cardiovascular:  Negative for chest pain and palpitations. Gastrointestinal: Negative for nausea, vomiting, abdominal pain, diarrhea or constipation. Genitourinary:     Negative for dysuria and frequency. +voids 1-2 x per night  Musculoskeletal: Negative for falls, tenderness and swelling. Skin:                    Negative for rash, masses or lesions. Neurological:       Negative for dizzyness, seizure, loss of consciousness, weakness and numbness. Objective:     Vitals:    05/19/22 0833   BP: 119/62   Pulse: 88   Resp: 18   Temp: 98.3 °F (36.8 °C)   TempSrc: Temporal   SpO2: 96%   Weight: 350 lb (158.8 kg)   Height: 6' 3\" (1.905 m)       Results for orders placed or performed in visit on 05/19/22   AMB POC HEMOGLOBIN A1C   Result Value Ref Range    Hemoglobin A1c (POC) 8.1 %   AMB POC GLUCOSE BLOOD, BY GLUCOSE MONITORING DEVICE   Result Value Ref Range    Glucose  MG/DL   AMB POC LIPID PROFILE   Result Value Ref Range    Cholesterol (POC) <100     Triglycerides (POC) 120     HDL Cholesterol (POC) 31     LDL Cholesterol (POC) n/a MG/DL    Non-HDL Goal (POC) n/a     TChol/HDL Ratio (POC) n/a          Gen: Oriented to person, place and time and well-developed, well-nourished and in no distress.    HEENT:    Head: normocephalic and atraumatic. Eyes:  EOM are normal. Pupils equal and round. Neck:  Normal range of motion. Neck supple. Cardiovascular: normal rate, regular rhythm and normal heart sounds. Pulmonary/Chest:  Effort normal and breath sounds normal.  No respiratory distress. No wheezes, no rales. Abdominal: soft, normal  bowel sounds. Musculoskeletal:  No edema, no tenderness. No calf tenderness or edema. Neurological:  Alert, oriented to person, place and time. Skin: skin is warm and dry. Assessment/ Plan:     1. Type 2 diabetes mellitus with hyperglycemia, with long-term current use of insulin ([facility] [address]  Working on diet/weight loss  - AMB POC HEMOGLOBIN A1C  - AMB POC GLUCOSE BLOOD, BY GLUCOSE MONITORING DEVICE  - AMB POC LIPID PROFILE  - METABOLIC PANEL, COMPREHENSIVE; Future  - CBC W/O DIFF; Future  - OneTouch Verio Flex meter misc; CHECK BLOOD SUGAR THREE TIMES DAILY  - OneTouch Delica Plus Lancet 33 gauge misc; CHECK BLOOD SUGAR THREE TIMES DAILY  - metFORMIN (GLUCOPHAGE) 1,000 mg tablet; TAKE 1 TABLET BY MOUTH TWICE DAILY WITH MEALS FOR DIABETES( REPLACES JANUMET)  Dispense: 180 Tablet; Refill: 1  - liraglutide (Victoza 3-Demar) 0.6 mg/0.1 mL (18 mg/3 mL) pnij; INJECT 1.8 MG UNDER THE SKIN DAILY  Dispense: 45 mL; Refill: 3  - insulin detemir U-100 (Levemir FlexTouch U-100 Insuln) 100 unit/mL (3 mL) inpn; INJECT 50 UNITS UNDER THE SKIN NIGHTLY  Dispense: 45 mL; Refill: 3  - empagliflozin (Jardiance) 10 mg tablet; TAKE 1 TABLET DAILY  Dispense: 90 Tablet; Refill: 3    2. Essential hypertension    - enalapril-hydroCHLOROthiazide (VASERETIC) 10-25 mg tablet; TAKE 1 TABLET DAILY  Dispense: 90 Tablet; Refill: 3    3. Mixed hyperlipidemia    - rosuvastatin (CRESTOR) 40 mg tablet; Take 1 Tablet by mouth nightly. Dispense: 90 Tablet; Refill: 1  - aspirin delayed-release 81 mg tablet; Take 1 Tablet by mouth daily. Dispense: 90 Tablet; Refill: 1    4. FOSTER (obstructive sleep apnea)      5. Nocturia    - PSA W/ REFLX FREE PSA; Future    6. Encounter for immunization    - PNEUMOCOCCAL CONJUGATE PCV20, PF (PREVNAR 20)    7. Lumbar radiculopathy    - pregabalin (LYRICA) 200 mg capsule  - ibuprofen (MOTRIN) 800 mg tablet; Take 1 Tablet by mouth every eight (8) hours as needed for Pain. Dispense: 90 Tablet; Refill: 0                I have discussed the diagnosis with the patient and the intended plan as seen in the above orders. The patient has received an after-visit summary and questions were answered concerning future plans. Pt conveyed understanding of plan. Medication Side Effects and Warnings were discussed with patient: yes  Patient Labs were reviewed: yes  Patient Past Records were reviewed:  yes    Shana Gastelum.  Chandan Pierce NP

## 2022-06-02 RX ORDER — ALBUTEROL SULFATE 90 UG/1
AEROSOL, METERED RESPIRATORY (INHALATION)
Qty: 8.5 G | Refills: 2 | Status: SHIPPED | OUTPATIENT
Start: 2022-06-02 | End: 2022-07-18

## 2022-07-18 RX ORDER — ALBUTEROL SULFATE 90 UG/1
AEROSOL, METERED RESPIRATORY (INHALATION)
Qty: 8.5 G | Refills: 6 | Status: SHIPPED | OUTPATIENT
Start: 2022-07-18

## 2022-07-27 ENCOUNTER — HOSPITAL ENCOUNTER (EMERGENCY)
Age: 52
Discharge: HOME | End: 2022-07-27
Attending: EMERGENCY MEDICINE
Payer: COMMERCIAL

## 2022-07-27 ENCOUNTER — APPOINTMENT (OUTPATIENT)
Dept: GENERAL RADIOLOGY | Age: 52
End: 2022-07-27
Attending: EMERGENCY MEDICINE
Payer: COMMERCIAL

## 2022-07-27 VITALS
WEIGHT: 315 LBS | SYSTOLIC BLOOD PRESSURE: 128 MMHG | OXYGEN SATURATION: 100 % | TEMPERATURE: 98.1 F | RESPIRATION RATE: 18 BRPM | DIASTOLIC BLOOD PRESSURE: 80 MMHG | HEART RATE: 97 BPM | BODY MASS INDEX: 39.17 KG/M2 | HEIGHT: 75 IN

## 2022-07-27 DIAGNOSIS — M17.11 ARTHRITIS OF KNEE, RIGHT: Primary | ICD-10-CM

## 2022-07-27 PROCEDURE — 74011250637 HC RX REV CODE- 250/637: Performed by: EMERGENCY MEDICINE

## 2022-07-27 PROCEDURE — 74011000250 HC RX REV CODE- 250: Performed by: EMERGENCY MEDICINE

## 2022-07-27 PROCEDURE — 73562 X-RAY EXAM OF KNEE 3: CPT

## 2022-07-27 PROCEDURE — 99283 EMERGENCY DEPT VISIT LOW MDM: CPT

## 2022-07-27 RX ORDER — METHOCARBAMOL 750 MG/1
750 TABLET, FILM COATED ORAL 4 TIMES DAILY
Qty: 56 TABLET | Refills: 0 | Status: SHIPPED | OUTPATIENT
Start: 2022-07-27 | End: 2022-08-10

## 2022-07-27 RX ORDER — NAPROXEN 250 MG/1
500 TABLET ORAL
Status: COMPLETED | OUTPATIENT
Start: 2022-07-27 | End: 2022-07-27

## 2022-07-27 RX ORDER — LIDOCAINE 4 G/100G
1 PATCH TOPICAL
Status: DISCONTINUED | OUTPATIENT
Start: 2022-07-27 | End: 2022-07-27 | Stop reason: HOSPADM

## 2022-07-27 RX ADMIN — NAPROXEN 500 MG: 250 TABLET ORAL at 00:39

## 2022-07-27 NOTE — ED PROVIDER NOTES
EMERGENCY DEPARTMENT HISTORY AND PHYSICAL EXAM     ------------------------------------------------------------------------------------------------------  Please note that this dictation was completed with [place], the [employer] voice recognition software. Quite often unanticipated grammatical, syntax, homophones, and other interpretive errors are inadvertently transcribed by the computer software. Please disregard these errors. Please excuse any errors that have escaped final proofreading.  -----------------------------------------------------------------------------------------------------------------    Date: 7/27/2022  Patient Name: Marcia Boone    History of Presenting Illness     Chief Complaint   Patient presents with    Knee Pain       History Provided By: Patient    HPI: Marcia Boone is a 46 y.o. male, with significant pmhx of spinal stenosis with R lumbar radiculopathy, who presents via private vehicle to the ED with c/o medial right knee pain that has been ongoing for the last 2 weeks but only occurs while driving. Notes that he is able to ambulate without pain or gait instability. No recent fall or injury. Feels as though there is some minor swelling along the medial aspect of his right knee With some warmth but no color change, calf pain or radiation of pain towards his thigh. Has not been taking any medication to alleviate his symptoms. Pt also specifically denies any recent fevers, chills, CP, SOB, nausea, vomiting, diarrhea, abd pain, changes in BM, urinary sxs, or headache. PCP: Kailey Cordero NP    Social Hx: denies tobacco, denies EtOH, denies recreational/ Illicit Drugs     There are no other complaints, changes, or physical findings at this time.      Allergies   Allergen Reactions    Diazepam Other (comments)     Caused hallucinations, paranoia    Shellfish Derived Angioedema         Current Facility-Administered Medications   Medication Dose Route Frequency Provider Last Rate Last Admin    lidocaine 4 % patch 1 Patch  1 Patch TransDERmal NOW Salomon See MD   1 Patch at 07/27/22 0039     Current Outpatient Medications   Medication Sig Dispense Refill    methocarbamoL (Robaxin-750) 750 mg tablet Take 1 Tablet by mouth four (4) times daily for 14 days. 56 Tablet 0    albuterol (PROVENTIL HFA, VENTOLIN HFA, PROAIR HFA) 90 mcg/actuation inhaler USE 1 INHALATION EVERY 6 HOURS AS NEEDED FOR WHEEZING 8.5 g 6    Insulin Needles, Disposable, (BD Ultra-Fine Short Pen Needle) 31 gauge x 5/16\" ndle USE TO INJECT LEVEMIR AND VICTOZA UNDER THE SKIN TWICE A  Each 0    glucose blood VI test strips (OneTouch Verio test strips) strip Check blood sugar three times daily 100 Strip 0    pregabalin (LYRICA) 200 mg capsule Take 1 Capsule by mouth two (2) times a day. Max Daily Amount: 400 mg. 60 Capsule 0    OneTouch Verio Flex meter misc CHECK BLOOD SUGAR THREE TIMES DAILY      OneTouch Delica Plus Lancet 33 gauge misc CHECK BLOOD SUGAR THREE TIMES DAILY      enalapril-hydroCHLOROthiazide (VASERETIC) 10-25 mg tablet TAKE 1 TABLET DAILY 90 Tablet 3    ibuprofen (MOTRIN) 800 mg tablet Take 1 Tablet by mouth every eight (8) hours as needed for Pain. 90 Tablet 0    rosuvastatin (CRESTOR) 40 mg tablet Take 1 Tablet by mouth nightly. 90 Tablet 1    aspirin delayed-release 81 mg tablet Take 1 Tablet by mouth daily. 90 Tablet 1    metFORMIN (GLUCOPHAGE) 1,000 mg tablet TAKE 1 TABLET BY MOUTH TWICE DAILY WITH MEALS FOR DIABETES( REPLACES JANUMET) 180 Tablet 1    liraglutide (Victoza 3-Demar) 0.6 mg/0.1 mL (18 mg/3 mL) pnij INJECT 1.8 MG UNDER THE SKIN DAILY 45 mL 3    insulin detemir U-100 (Levemir FlexTouch U-100 Insuln) 100 unit/mL (3 mL) inpn INJECT 50 UNITS UNDER THE SKIN NIGHTLY 45 mL 3    empagliflozin (Jardiance) 10 mg tablet TAKE 1 TABLET DAILY 90 Tablet 3    sildenafil citrate (Viagra) 100 mg tablet Take 1 Tablet by mouth daily as needed for Erectile Dysfunction.  20 Tablet 2    Blood-Glucose Meter monitoring kit Check sugars TID. E11.65. Once touch 1 Kit 0    lancets misc Use as directed. Dx: e11.65 check sugars TID 1 Each 11    glucose blood VI test strips (ASCENSIA AUTODISC VI, ONE TOUCH ULTRA TEST VI) strip E11.65 check sugar daily 100 Strip 11    Insulin Needles, Disposable, 30 gauge x 1/3\" USE TO INJECT LEVEMIR AND VICTOZA TWICE DAILY 1 Pen Needle 0       Past History     Past Medical History:  Past Medical History:   Diagnosis Date    Asthma     Diabetes ([facility] [address]     HTN (hypertension) 3/30/2010    Hypertension     Other and unspecified hyperlipidemia 3/30/2010    Sleep apnea     Type II or unspecified type diabetes mellitus without mention of complication, uncontrolled 3/30/2010       Past Surgical History:  Past Surgical History:   Procedure Laterality Date    PA ABDOMEN SURGERY PROC UNLISTED      hernia repair       Family History:  Family History   Problem Relation Age of Onset    Diabetes Mother         cancer, liver? hep c    Hypertension Mother     Hypertension Father     Diabetes Brother     Hypertension Brother     Hypertension Maternal Uncle     Diabetes Maternal Grandmother     Diabetes Paternal Grandmother     Hypertension Maternal Uncle     Hypertension Maternal Uncle     Diabetes Paternal Uncle        Social History:  Social History     Tobacco Use    Smoking status: Some Days     Packs/day: 0.50     Years: 22.00     Pack years: 11.00     Types: Cigars, Cigarettes    Smokeless tobacco: Never    Tobacco comments:     black and mild   Vaping Use    Vaping Use: Never used   Substance Use Topics    Alcohol use: Yes     Alcohol/week: 10.0 standard drinks     Types: 10 Standard drinks or equivalent per week     Comment: Socially     Drug use: No       Allergies: Allergies   Allergen Reactions    Diazepam Other (comments)     Caused hallucinations, paranoia    Shellfish Derived Angioedema         Review of Systems   Review of Systems   Constitutional:  Negative for chills and fever.    HENT: Negative. Eyes: Negative. Respiratory:  Negative for cough, chest tightness and shortness of breath. Cardiovascular:  Negative for chest pain and leg swelling. Gastrointestinal:  Negative for abdominal pain, diarrhea, nausea and vomiting. Endocrine: Negative. Genitourinary:  Negative for difficulty urinating and dysuria. Musculoskeletal:  Positive for arthralgias. Negative for myalgias. Skin: Negative. Neurological: Negative. Psychiatric/Behavioral: Negative. All other systems reviewed and are negative. Physical Exam   Physical Exam  Vitals and nursing note reviewed. HENT:      Head: Normocephalic and atraumatic. Nose: No nasal deformity. Mouth/Throat:      Lips: No lesions. Eyes:      Conjunctiva/sclera: Conjunctivae normal.   Cardiovascular:      Rate and Rhythm: Normal rate. Pulmonary:      Effort: Pulmonary effort is normal.   Musculoskeletal:         General: Normal range of motion. Cervical back: Normal range of motion. No pain with movement. Right knee: Effusion (Small) and bony tenderness (Along medial aspect of patella) present. No crepitus. No tenderness. Right lower leg: No edema. Left lower leg: No edema. Legs:    Skin:     General: Skin is dry. Findings: No rash. Neurological:      General: No focal deficit present. Mental Status: He is alert. Psychiatric:         Mood and Affect: Mood normal.         Diagnostic Study Results     Labs -   No results found for this or any previous visit (from the past 12 hour(s)). Radiologic Studies -   XR KNEE RT 3 V    (Results Pending)     CT Results  (Last 48 hours)      None          CXR Results  (Last 48 hours)      None              Medical Decision Making   I am the first provider for this patient. I reviewed the vital signs, available nursing notes, past medical history, past surgical history, family history and social history.     Vital Signs-Reviewed the patient's vital signs. Patient Vitals for the past 12 hrs:   Temp Pulse Resp BP SpO2   07/27/22 0008 98.1 °F (36.7 °C) 97 18 128/80 100 %       Pulse Oximetry Analysis - 100% on RA Normal    Records Reviewed/Interpretted: Nursing Notes from triage and Old Medical Records previous primary care and orthopedic management of spinal stenosis    Provider Notes (Medical Decision Making):     DDX:  Knee effusion, arthritis, contusion    Plan:  X-ray, analgesic      Patient without calf tenderness or thigh pain. Notes that his pain is only along the medial aspect of his right knee when in flexion and applying pressure to his accelerator or brake while driving. She has no suspicion for associated DVT. Will defer ultrasound imaging at this time. Impression:  Acute knee pain, effusion    ED Course:   Initial assessment performed. The patients presenting problems have been discussed, and they are in agreement with the care plan formulated and outlined with them. I have encouraged them to ask questions as they arise throughout their visit. I reviewed our electronic medical record system for any past medical records that were available that may contribute to the patients current condition, the nursing notes and and vital signs from today's visit  Nursing notes will be reviewed as they become available in realtime while the pt has been in the ED. Joann Mcdonald MD    I personally reviewed/interpreted pt's imaging. Agree with official read by radiology as noted above. Joann Mcdonald MD      4:20 AM   Progress note:  Pt noted to be feeling better, ready for discharge. Discussed imaging findings with pt, specifically noting mild arthritis on xray. Pt will follow up with orthopedics as instructed. All questions have been answered, pt voiced understanding and agreement with plan. Specific return precautions provided in addition to instructions for pt to return to the ED immediately should sx worsen at any time.   Joann Mcdonald MD             Critical Care Time:     none      Diagnosis     Clinical Impression:   1. Arthritis of knee, right        PLAN:  1. Current Discharge Medication List        START taking these medications    Details   methocarbamoL (Robaxin-750) 750 mg tablet Take 1 Tablet by mouth four (4) times daily for 14 days. Qty: 56 Tablet, Refills: 0  Start date: 7/27/2022, End date: 8/10/2022           2. Follow-up Information       Follow up With Specialties Details Why Contact Allison Holguin, NP Nurse Practitioner Schedule an appointment as soon as possible for a visit in 2 days  [address] [place] [address]      Laurie Haynes DO Orthopedic Surgery Schedule an appointment as soon as possible for a visit in 2 days As needed, If symptoms worsen [address] [place] [address]  [phone]      [facility] EMERGENCY DEPT Emergency Medicine   [address] [place] [address]  [phone]          Return to ED if worse     Disposition:    4:20 AM    The patient's results have been reviewed with family and/or caregiver. They verbally convey their understanding and agreement of the patient's signs, symptoms, diagnosis, treatment and prognosis and additionally agree to follow up as recommended in the discharge instructions or to return to the Emergency Room should the patient's condition change prior to their follow-up appointment. The family and/or caregiver verbally agrees with the care-plan and all of their questions have been answered. The discharge instructions have also been provided to the them with educational information regarding the patient's diagnosis as well a list of reasons why the patient would want to return to the ER prior to their follow-up appointment should their condition change.   Josette Machado MD

## 2022-07-27 NOTE — Clinical Note
[address]  [facility] EMERGENCY DEPT  [address] [facility]  [place] [id number] [phone]    Work/School Note    Date: 7/27/2022    To Whom It May concern:      Nino Hernandez was seen and treated today in the emergency room by the following provider(s):  Attending Provider: Clemente Tobar MD.      Nino Hernandez is excused from work/school on 07/27/22. He is clear to return to work/school on 07/28/22.         Sincerely,          Lakeisha Cope MD

## 2022-08-09 ENCOUNTER — VIRTUAL VISIT (OUTPATIENT)
Dept: INTERNAL MEDICINE CLINIC | Age: 52
End: 2022-08-09
Payer: COMMERCIAL

## 2022-08-09 DIAGNOSIS — I10 ESSENTIAL HYPERTENSION: ICD-10-CM

## 2022-08-09 DIAGNOSIS — E11.9 TYPE 2 DIABETES MELLITUS WITHOUT COMPLICATION, WITH LONG-TERM CURRENT USE OF INSULIN (HCC): ICD-10-CM

## 2022-08-09 DIAGNOSIS — Z79.4 TYPE 2 DIABETES MELLITUS WITHOUT COMPLICATION, WITH LONG-TERM CURRENT USE OF INSULIN (HCC): ICD-10-CM

## 2022-08-09 DIAGNOSIS — E78.2 MIXED HYPERLIPIDEMIA: ICD-10-CM

## 2022-08-09 DIAGNOSIS — M54.16 LUMBAR RADICULOPATHY: ICD-10-CM

## 2022-08-09 DIAGNOSIS — E66.01 MORBID OBESITY (HCC): ICD-10-CM

## 2022-08-09 DIAGNOSIS — M17.11 PRIMARY OSTEOARTHRITIS OF RIGHT KNEE: Primary | ICD-10-CM

## 2022-08-09 DIAGNOSIS — R35.1 NOCTURIA: ICD-10-CM

## 2022-08-09 PROCEDURE — 99214 OFFICE O/P EST MOD 30 MIN: CPT | Performed by: NURSE PRACTITIONER

## 2022-08-09 RX ORDER — TOPIRAMATE 25 MG/1
25 TABLET ORAL 2 TIMES DAILY
Qty: 60 TABLET | Refills: 0 | Status: SHIPPED | OUTPATIENT
Start: 2022-08-09 | End: 2022-09-11

## 2022-08-09 RX ORDER — PREGABALIN 200 MG/1
200 CAPSULE ORAL 2 TIMES DAILY
Qty: 60 CAPSULE | Refills: 3 | Status: SHIPPED | OUTPATIENT
Start: 2022-08-09

## 2022-08-09 RX ORDER — PREDNISONE 5 MG/1
TABLET ORAL
Qty: 21 TABLET | Refills: 0 | Status: SHIPPED | OUTPATIENT
Start: 2022-08-09

## 2022-08-09 NOTE — PROGRESS NOTES
Chief Complaint   Patient presents with    Follow-up    ED Follow-up     [facility] knee pain - right knee pt states he is still having pain     Referral Request     chiropractor     Pt rates knee pain 5/10     1. \"Have you been to the ER, urgent care clinic since your last visit? Hospitalized since your last visit? \" Yes When: 7/27/22 Where: [facility] Reason for visit: arthritis in knee    2. \"Have you seen or consulted any other health care providers outside of the [address] [place] [address] since your last visit? \" No     3. For patients aged 39-70: Has the patient had a colonoscopy / FIT/ Cologuard? Yes - no Care Gap present      If the patient is female:    4. For patients aged 41-77: Has the patient had a mammogram within the past 2 years? NA - based on age or sex      11. For patients aged 21-65: Has the patient had a pap smear?  NA - based on age or sex

## 2022-08-09 NOTE — PROGRESS NOTES
Jasmyn Woody is a 46 y.o. male who was seen by synchronous (real-time) audio-video technology on 8/9/2022 for Follow-up, ED Follow-up (MRM knee pain - right knee pt states he is still having pain ), and Referral Request (chiropractor)        Assessment & Plan:   Diagnoses and all orders for this visit:    1. Primary osteoarthritis of right knee  -     predniSONE (STERAPRED) 5 mg dose pack; See administration instruction per 5mg dose pack    2. Type 2 diabetes mellitus without complication, with long-term current use of insulin (HCC)  -     METABOLIC PANEL, COMPREHENSIVE  -     LIPID PANEL  -     HEMOGLOBIN A1C WITH EAG    3. Essential hypertension    4. Mixed hyperlipidemia    5. Lumbar radiculopathy  -     pregabalin (LYRICA) 200 mg capsule; Take 1 Capsule by mouth two (2) times a day. Max Daily Amount: 400 mg.  -     predniSONE (STERAPRED) 5 mg dose pack; See administration instruction per 5mg dose pack    6. Nocturia  -     PSA W/ REFLX FREE PSA; Future    7. Morbid obesity (HCC)  -     topiramate (TOPAMAX) 25 mg tablet; Take 1 Tablet by mouth two (2) times a day. Follow-up and Dispositions    Return in about 3 months (around 11/9/2022). Recc contact Dr. Comer Goltz for possible 2nd injection, can refer to ortho for knee if needed  Trial topamax for weight loss/binge eating      Subjective:       Prior to Admission medications    Medication Sig Start Date End Date Taking? Authorizing Provider   pregabalin (LYRICA) 200 mg capsule Take 1 Capsule by mouth two (2) times a day. Max Daily Amount: 400 mg. 8/9/22  Yes Lina Leaks., NP   predniSONE (STERAPRED) 5 mg dose pack See administration instruction per 5mg dose pack 8/9/22  Yes Lina Leaks., NP   topiramate (TOPAMAX) 25 mg tablet Take 1 Tablet by mouth two (2) times a day. 8/9/22  Yes Lina Leaks., NP   methocarbamoL (Robaxin-750) 750 mg tablet Take 1 Tablet by mouth four (4) times daily for 14 days.  7/27/22 8/10/22 Yes Theodore Avila MD   albuterol (PROVENTIL HFA, VENTOLIN HFA, PROAIR HFA) 90 mcg/actuation inhaler USE 1 INHALATION EVERY 6 HOURS AS NEEDED FOR WHEEZING 7/18/22  Yes Ad Gates NP   enalapril-hydroCHLOROthiazide (VASERETIC) 10-25 mg tablet TAKE 1 TABLET DAILY 5/19/22  Yes Ad Gates NP   ibuprofen (MOTRIN) 800 mg tablet Take 1 Tablet by mouth every eight (8) hours as needed for Pain. 5/19/22  Yes Ad Gates NP   rosuvastatin (CRESTOR) 40 mg tablet Take 1 Tablet by mouth nightly. 5/19/22  Yes Ad Gates, KATIA   aspirin delayed-release 81 mg tablet Take 1 Tablet by mouth daily. 5/19/22  Yes Ad Gates NP   metFORMIN (GLUCOPHAGE) 1,000 mg tablet TAKE 1 TABLET BY MOUTH TWICE DAILY WITH MEALS FOR DIABETES( REPLACES JANUMET) 5/19/22  Yes Ad Gates NP   liraglutide (Victoza 3-Demar) 0.6 mg/0.1 mL (18 mg/3 mL) pnij INJECT 1.8 MG UNDER THE SKIN DAILY 5/19/22  Yes Ad Gates NP   insulin detemir U-100 (Levemir FlexTouch U-100 Insuln) 100 unit/mL (3 mL) inpn INJECT 50 UNITS UNDER THE SKIN NIGHTLY 5/19/22  Yes Ad Gates NP   empagliflozin (Jardiance) 10 mg tablet TAKE 1 TABLET DAILY 5/19/22  Yes Ad Gates NP   sildenafil citrate (Viagra) 100 mg tablet Take 1 Tablet by mouth daily as needed for Erectile Dysfunction. 2/17/22  Yes Ad Gates NP   Insulin Needles, Disposable, (BD Ultra-Fine Short Pen Needle) 31 gauge x 5/16\" ndle USE TO INJECT LEVEMIR AND VICTOZA UNDER THE SKIN TWICE A DAY 7/11/22   Yudith Chavez MD   glucose blood VI test strips (OneTouch Verio test strips) strip Check blood sugar three times daily 7/11/22   Yudith Chavez MD   pregabalin (LYRICA) 200 mg capsule Take 1 Capsule by mouth two (2) times a day.  Max Daily Amount: 400 mg. 7/8/22 8/9/22  Yudith Chavez MD   OneTouch Verio Flex meter misc CHECK BLOOD SUGAR THREE TIMES DAILY 2/23/22   Provider, Laura Jerry Lancet [address] DAILY 2/17/22   Provider, Historical   Blood-Glucose Meter monitoring kit Check sugars TID. E11.65. Once touch 2/17/22   Veronica Boswell, KATIA   lancets misc Use as directed. Dx: e11.65 check sugars TID 2/17/22   Veronica Boswell, KATIA   glucose blood VI test strips (ASCENSIA AUTODISC VI, ONE TOUCH ULTRA TEST VI) strip E11.65 check sugar daily 2/17/22   Veronica Boswell, KATIA   Insulin Needles, Disposable, 30 gauge x 1/3\" USE TO INJECT LEVEMIR AND VICTOZA TWICE DAILY 12/24/19   Veronica Boswell, KATIA     Patient Active Problem List   Diagnosis Code    Type II diabetes mellitus, uncontrolled [id number]    Hypertension I10    Hyperlipidemia E78.5    Asthma J45.909    Sleep apnea G47.30    Obesity, morbid ([address] E66.01    Smoker F17.200    Low vitamin D level R79.89    Type 2 diabetes mellitus E11.9       ROS    Diabetic Review of Systems - medication compliance: compliant all of the time, diabetic diet compliance: compliant most of the time, home glucose monitoring: is performed regularly.     Hypertension ROS:  taking medications as instructed, no medication side effects noted, no TIAs, no chest pain on exertion, no dyspnea on exertion, no swelling of ankles    R knee pain: presented to ED- xray showed mild DJD, he has clicking/popping  Mild swelling, worse w/ driving  Ibup doesn't helps  Rx'd robaxin, he states that it doesn't help much    Hyperlipidemia; tolerating statin    Nocturia: never got previously ordered PSA labs    Lumbar radiculopathy: he has seen ortho and rc'd injection around 10/21, it helped but pain is flaring  He is a candidate for surgery if he can lose around 20 pounds which he is struggling w/  He admits to binge eating,late meals, [place] has not helped    Objective:     Patient-Reported Vitals 8/9/2022   Patient-Reported Weight 349   Patient-Reported Temperature 98.2   Patient-Reported Systolic  898   Patient-Reported Diastolic 80 [INSTRUCTIONS:  \"[x]\" Indicates a positive item  \"[]\" Indicates a negative item  -- DELETE ALL ITEMS NOT EXAMINED]    Constitutional: [x] Appears well-developed and well-nourished [x] No apparent distress      [] Abnormal -     Mental status: [x] Alert and awake  [x] Oriented to person/place/time [x] Able to follow commands    [] Abnormal -     Eyes:   EOM    [x]  Normal    [] Abnormal -   Sclera  [x]  Normal    [] Abnormal -          Discharge [x]  None visible   [] Abnormal -     HENT: [x] Normocephalic, atraumatic  [] Abnormal -   [x] Mouth/Throat: Mucous membranes are moist    External Ears [x] Normal  [] Abnormal -    Neck: [x] No visualized mass [] Abnormal -     Pulmonary/Chest: [x] Respiratory effort normal   [x] No visualized signs of difficulty breathing or respiratory distress        [] Abnormal -      Musculoskeletal:   [x] Normal gait with no signs of ataxia         [x] Normal range of motion of neck        [] Abnormal -     Neurological:        [x] No Facial Asymmetry (Cranial nerve 7 motor function) (limited exam due to video visit)          [x] No gaze palsy        [] Abnormal -          Skin:        [x] No significant exanthematous lesions or discoloration noted on facial skin         [] Abnormal -            Psychiatric:       [x] Normal Affect [] Abnormal -        [x] No Hallucinations    Other pertinent observable physical exam findings:-        We discussed the expected course, resolution and complications of the diagnosis(es) in detail. Medication risks, benefits, costs, interactions, and alternatives were discussed as indicated. I advised him to contact the office if his condition worsens, changes or fails to improve as anticipated. He expressed understanding with the diagnosis(es) and planAmy Escalante, was evaluated through a synchronous (real-time) audio-video encounter.  The patient (or guardian if applicable) is aware that this is a billable service, which includes applicable co-pays. This Virtual Visit was conducted with patient's (and/or legal guardian's) consent. The visit was conducted pursuant to the emergency declaration under the [address] [place] [address] authority and the [employer] General Act. Patient identification was verified, and a caregiver was present when appropriate. The patient was located at: Home: [address] [place] [id number]  The provider was located at: Home: [unfilled]        Nasrin Burton.  Clark Smiley NP

## 2022-08-25 LAB
ALBUMIN SERPL-MCNC: 4.4 G/DL (ref 3.8–4.9)
ALBUMIN/GLOB SERPL: 2.3 {RATIO} (ref 1.2–2.2)
ALP SERPL-CCNC: 78 IU/L (ref 44–121)
ALT SERPL-CCNC: 18 IU/L (ref 0–44)
AST SERPL-CCNC: 22 IU/L (ref 0–40)
BILIRUB SERPL-MCNC: 0.3 MG/DL (ref 0–1.2)
BUN SERPL-MCNC: 15 MG/DL (ref 6–24)
BUN/CREAT SERPL: 15 (ref 9–20)
CALCIUM SERPL-MCNC: 9.4 MG/DL (ref 8.7–10.2)
CHLORIDE SERPL-SCNC: 101 MMOL/L (ref 96–106)
CHOLEST SERPL-MCNC: 116 MG/DL (ref 100–199)
CO2 SERPL-SCNC: 25 MMOL/L (ref 20–29)
CREAT SERPL-MCNC: 0.99 MG/DL (ref 0.76–1.27)
EGFR: 92 ML/MIN/1.73
EST. AVERAGE GLUCOSE BLD GHB EST-MCNC: 192 MG/DL
GLOBULIN SER CALC-MCNC: 1.9 G/DL (ref 1.5–4.5)
GLUCOSE SERPL-MCNC: 154 MG/DL (ref 65–99)
HBA1C MFR BLD: 8.3 % (ref 4.8–5.6)
HDLC SERPL-MCNC: 48 MG/DL
IMP & REVIEW OF LAB RESULTS: NORMAL
LDLC SERPL CALC-MCNC: 48 MG/DL (ref 0–99)
POTASSIUM SERPL-SCNC: 4.2 MMOL/L (ref 3.5–5.2)
PROT SERPL-MCNC: 6.3 G/DL (ref 6–8.5)
PSA SERPL-MCNC: 0.6 NG/ML (ref 0–4)
REFLEX CRITERIA: NORMAL
SODIUM SERPL-SCNC: 140 MMOL/L (ref 134–144)
TRIGL SERPL-MCNC: 112 MG/DL (ref 0–149)
VLDLC SERPL CALC-MCNC: 20 MG/DL (ref 5–40)

## 2022-09-11 DIAGNOSIS — E66.01 MORBID OBESITY (HCC): ICD-10-CM

## 2022-09-11 RX ORDER — TOPIRAMATE 25 MG/1
TABLET ORAL
Qty: 60 TABLET | Refills: 0 | Status: SHIPPED | OUTPATIENT
Start: 2022-09-11 | End: 2022-10-09

## 2022-10-09 DIAGNOSIS — E66.01 MORBID OBESITY (HCC): ICD-10-CM

## 2022-10-09 RX ORDER — TOPIRAMATE 25 MG/1
TABLET ORAL
Qty: 60 TABLET | Refills: 0 | Status: SHIPPED | OUTPATIENT
Start: 2022-10-09

## 2022-10-23 DIAGNOSIS — E11.65 TYPE 2 DIABETES MELLITUS WITH HYPERGLYCEMIA, WITH LONG-TERM CURRENT USE OF INSULIN (HCC): ICD-10-CM

## 2022-10-23 DIAGNOSIS — E78.2 MIXED HYPERLIPIDEMIA: ICD-10-CM

## 2022-10-23 DIAGNOSIS — Z79.4 TYPE 2 DIABETES MELLITUS WITH HYPERGLYCEMIA, WITH LONG-TERM CURRENT USE OF INSULIN (HCC): ICD-10-CM

## 2022-10-24 DIAGNOSIS — E11.65 TYPE 2 DIABETES MELLITUS WITH HYPERGLYCEMIA, WITH LONG-TERM CURRENT USE OF INSULIN (HCC): ICD-10-CM

## 2022-10-24 DIAGNOSIS — Z79.4 TYPE 2 DIABETES MELLITUS WITH HYPERGLYCEMIA, WITH LONG-TERM CURRENT USE OF INSULIN (HCC): ICD-10-CM

## 2022-10-24 RX ORDER — PEN NEEDLE, DIABETIC 30 GX3/16"
NEEDLE, DISPOSABLE MISCELLANEOUS
Qty: 100 EACH | Refills: 0 | Status: SHIPPED | OUTPATIENT
Start: 2022-10-24

## 2022-10-24 RX ORDER — ROSUVASTATIN CALCIUM 40 MG/1
TABLET, COATED ORAL
Qty: 90 TABLET | Refills: 3 | Status: SHIPPED | OUTPATIENT
Start: 2022-10-24

## 2022-10-24 RX ORDER — METFORMIN HYDROCHLORIDE 1000 MG/1
TABLET ORAL
Qty: 180 TABLET | Refills: 3 | Status: SHIPPED | OUTPATIENT
Start: 2022-10-24

## 2022-11-10 DIAGNOSIS — E66.01 MORBID OBESITY (HCC): ICD-10-CM

## 2022-11-10 RX ORDER — TOPIRAMATE 25 MG/1
TABLET ORAL
Qty: 60 TABLET | Refills: 0 | Status: SHIPPED | OUTPATIENT
Start: 2022-11-10

## 2022-12-12 DIAGNOSIS — E78.2 MIXED HYPERLIPIDEMIA: ICD-10-CM

## 2022-12-13 RX ORDER — ASPIRIN 81 MG/1
TABLET ORAL
Qty: 90 TABLET | Refills: 3 | Status: SHIPPED | OUTPATIENT
Start: 2022-12-13

## 2022-12-21 ENCOUNTER — OFFICE VISIT (OUTPATIENT)
Dept: INTERNAL MEDICINE CLINIC | Age: 52
End: 2022-12-21
Payer: COMMERCIAL

## 2022-12-21 VITALS
SYSTOLIC BLOOD PRESSURE: 137 MMHG | TEMPERATURE: 98.4 F | RESPIRATION RATE: 19 BRPM | HEART RATE: 79 BPM | OXYGEN SATURATION: 95 % | HEIGHT: 75 IN | WEIGHT: 315 LBS | BODY MASS INDEX: 39.17 KG/M2 | DIASTOLIC BLOOD PRESSURE: 86 MMHG

## 2022-12-21 DIAGNOSIS — E66.01 MORBID OBESITY (HCC): ICD-10-CM

## 2022-12-21 DIAGNOSIS — Z23 NEEDS FLU SHOT: ICD-10-CM

## 2022-12-21 DIAGNOSIS — L02.91 ABSCESS: ICD-10-CM

## 2022-12-21 DIAGNOSIS — E11.65 TYPE 2 DIABETES MELLITUS WITH HYPERGLYCEMIA, WITH LONG-TERM CURRENT USE OF INSULIN (HCC): ICD-10-CM

## 2022-12-21 DIAGNOSIS — N52.9 ERECTILE DYSFUNCTION, UNSPECIFIED ERECTILE DYSFUNCTION TYPE: ICD-10-CM

## 2022-12-21 DIAGNOSIS — E78.2 MIXED HYPERLIPIDEMIA: ICD-10-CM

## 2022-12-21 DIAGNOSIS — I10 ESSENTIAL HYPERTENSION: Primary | ICD-10-CM

## 2022-12-21 DIAGNOSIS — Z79.4 TYPE 2 DIABETES MELLITUS WITH HYPERGLYCEMIA, WITH LONG-TERM CURRENT USE OF INSULIN (HCC): ICD-10-CM

## 2022-12-21 LAB
ALBUMIN UR QL STRIP: 30 MG/L
CREATININE, URINE POC: 200 MG/DL
GLUCOSE POC: 163 MG/DL
HBA1C MFR BLD HPLC: 8.8 %
MICROALBUMIN/CREAT RATIO POC: <30 MG/G

## 2022-12-21 PROCEDURE — 82962 GLUCOSE BLOOD TEST: CPT | Performed by: NURSE PRACTITIONER

## 2022-12-21 PROCEDURE — 83036 HEMOGLOBIN GLYCOSYLATED A1C: CPT | Performed by: NURSE PRACTITIONER

## 2022-12-21 PROCEDURE — 82044 UR ALBUMIN SEMIQUANTITATIVE: CPT | Performed by: NURSE PRACTITIONER

## 2022-12-21 PROCEDURE — 90686 IIV4 VACC NO PRSV 0.5 ML IM: CPT | Performed by: NURSE PRACTITIONER

## 2022-12-21 PROCEDURE — 99214 OFFICE O/P EST MOD 30 MIN: CPT | Performed by: NURSE PRACTITIONER

## 2022-12-21 PROCEDURE — 90471 IMMUNIZATION ADMIN: CPT | Performed by: NURSE PRACTITIONER

## 2022-12-21 RX ORDER — AMOXICILLIN AND CLAVULANATE POTASSIUM 875; 125 MG/1; MG/1
1 TABLET, FILM COATED ORAL 2 TIMES DAILY
Qty: 14 TABLET | Refills: 0 | Status: SHIPPED | OUTPATIENT
Start: 2022-12-21 | End: 2022-12-28

## 2022-12-21 RX ORDER — CHLORHEXIDINE GLUCONATE 4 G/100ML
1 SOLUTION TOPICAL
Qty: 236 ML | Refills: 0 | Status: SHIPPED | OUTPATIENT
Start: 2022-12-21 | End: 2023-01-04

## 2022-12-21 RX ORDER — SILDENAFIL 100 MG/1
100 TABLET, FILM COATED ORAL
Qty: 20 TABLET | Refills: 2 | Status: SHIPPED | OUTPATIENT
Start: 2022-12-21

## 2022-12-21 NOTE — PROGRESS NOTES
Chief Complaint   Patient presents with    Follow-up     DM    Cyst     Pt states he keeps getting boils all over body, has always had them but they are starting to come more frequently        1. \"Have you been to the ER, urgent care clinic since your last visit? Hospitalized since your last visit? \" No    2. \"Have you seen or consulted any other health care providers outside of the [address] [place] [address] since your last visit? \" No     3. For patients aged 39-70: Has the patient had a colonoscopy / FIT/ Cologuard? Yes - no Care Gap present      If the patient is female:    4. For patients aged 41-77: Has the patient had a mammogram within the past 2 years? NA - based on age or sex      11. For patients aged 21-65: Has the patient had a pap smear?  NA - based on age or sex

## 2022-12-21 NOTE — PATIENT INSTRUCTIONS
Vaccine Information Statement    Influenza (Flu) Vaccine (Inactivated or Recombinant): What You Need to Know    Many vaccine information statements are available in [place] and other languages. See www.immunize.org/vis. Hojas de información sobre vacunas están disponibles en español y en muchos otros idiomas. Visite www.immunize.org/vis. 1. Why get vaccinated? Influenza vaccine can prevent influenza (flu). Flu is a contagious disease that spreads around the [facility] every year, usually between October and May. Anyone can get the flu, but it is more dangerous for some people. Infants and young children, people 72 years and older, pregnant people, and people with certain health conditions or a weakened immune system are at greatest risk of flu complications. Pneumonia, bronchitis, sinus infections, and ear infections are examples of flu-related complications. If you have a medical condition, such as heart disease, cancer, or diabetes, flu can make it worse. Flu can cause fever and chills, sore throat, muscle aches, fatigue, cough, headache, and runny or stuffy nose. Some people may have vomiting and diarrhea, though this is more common in children than adults. In an average year, thousands of people in the [facility] die from flu, and many more are hospitalized. Flu vaccine prevents millions of illnesses and flu-related visits to the doctor each year. 2. Influenza vaccines     CDC recommends everyone 6 months and older get vaccinated every flu season. Children 6 months through 6years of age may need 2 doses during a single flu season. Everyone else needs only 1 dose each flu season. It takes about 2 weeks for protection to develop after vaccination. There are many flu viruses, and they are always changing. Each year a new flu vaccine is made to protect against the influenza viruses believed to be likely to cause disease in the upcoming flu season.  Even when the vaccine doesnt exactly match these viruses, it may still provide some protection. Influenza vaccine does not cause flu. Influenza vaccine may be given at the same time as other vaccines. 3. Talk with your health care provider    Tell your vaccination provider if the person getting the vaccine:  Has had an allergic reaction after a previous dose of influenza vaccine, or has any severe, life-threatening allergies   Has ever had Guillain-Barré Syndrome (also called GBS)    In some cases, your health care provider may decide to postpone influenza vaccination until a future visit. Influenza vaccine can be administered at any time during pregnancy. People who are or will be pregnant during influenza season should receive inactivated influenza vaccine. People with minor illnesses, such as a cold, may be vaccinated. People who are moderately or severely ill should usually wait until they recover before getting influenza vaccine. Your health care provider can give you more information. 4. Risks of a vaccine reaction    Soreness, redness, and swelling where the shot is given, fever, muscle aches, and headache can happen after influenza vaccination. There may be a very small increased risk of Guillain-Barré Syndrome (GBS) after inactivated influenza vaccine (the flu shot). Brad Almaguer children who get the flu shot along with pneumococcal vaccine (PCV13) and/or DTaP vaccine at the same time might be slightly more likely to have a seizure caused by fever. Tell your health care provider if a child who is getting flu vaccine has ever had a seizure. People sometimes faint after medical procedures, including vaccination. Tell your provider if you feel dizzy or have vision changes or ringing in the ears. As with any medicine, there is a very remote chance of a vaccine causing a severe allergic reaction, other serious injury, or death. 5. What if there is a serious problem?     An allergic reaction could occur after the vaccinated person leaves the clinic. If you see signs of a severe allergic reaction (hives, swelling of the face and throat, difficulty breathing, a fast heartbeat, dizziness, or weakness), call 9-1-1 and get the person to the nearest hospital.    For other signs that concern you, call your health care provider. Adverse reactions should be reported to the Vaccine Adverse Event Reporting System (VAERS). Your health care provider will usually file this report, or you can do it yourself. Visit the VAERS website at www.vaers. [facility].gov or call 7-599.941.2063. VAERS is only for reporting reactions, and VAERS staff members do not give medical advice. 6. The National Vaccine Injury Compensation Program    The [facility] Vaccine Injury Compensation Program (VICP) is a federal program that was created to compensate people who may have been injured by certain vaccines. Claims regarding alleged injury or death due to vaccination have a time limit for filing, which may be as short as two years. Visit the VICP website at www.[facility].gov/vaccinecompensation or call 7-654.622.2311 to learn about the program and about filing a claim. 7. How can I learn more? Ask your health care provider. Call your local or state health department. Visit the website of the Food and Drug Administration (FDA) for vaccine package inserts and additional information at www.fda.gov/vaccines-blood-biologics/vaccines. Contact the Centers for Disease Control and Prevention (CDC): Call 3-998.788.8555 (1-800-CDC-INFO) or  Visit CDCs influenza website at www.cdc.gov/flu. Vaccine Information Statement   Inactivated Influenza Vaccine   8/6/2021  [address] THOM[address] Lerma [id number]   Department of Health and Human Services  Centers for Disease Control and Prevention    Office Use Only

## 2022-12-21 NOTE — PROGRESS NOTES
Subjective: (As above and below)     Chief Complaint   Patient presents with    Follow-up     DM    Cyst     Pt states he keeps getting boils all over body, has always had them but they are starting to come more frequently      Vadim AGUILAR Blanca Meneses is a 46y.o. year old male who presents for     Hypertension ROS:  taking medications as instructed, no medication side effects noted, no TIAs, no chest pain on exertion, no dyspnea on exertion, no swelling of ankles    Hyperlipidemia tolerating statin    Diabetic Review of Systems - medication compliance: noncompliant some of the time, diabetic diet compliance: noncompliant some of the time, home glucose monitoring: is performed regularly. He admits to frequent missed doses and diet has been worse this holiday  Open to DM ed classes  He also picked up smoking black and milds again    Weight; topamax hasn't helped  Wt Readings from Last 3 Encounters:   12/21/22 347 lb (157.4 kg)   07/27/22 341 lb 7.9 oz (154.9 kg)   05/19/22 350 lb (158.8 kg)     Boils: getting more frequently, will appear \"anywhere on my body\"  He pops them and purulent drainage often comes out  Current one to L leg    Reviewed PmHx, RxHx, FmHx, SocHx, AllgHx and updated in chart.   Family History   Problem Relation Age of Onset    Diabetes Mother         cancer, liver? hep c    Hypertension Mother     Hypertension Father     Diabetes Brother     Hypertension Brother     Hypertension Maternal Uncle     Diabetes Maternal Grandmother     Diabetes Paternal Grandmother     Hypertension Maternal Uncle     Hypertension Maternal Uncle     Diabetes Paternal Uncle        Past Medical History:   Diagnosis Date    Asthma     Diabetes ([facility] [address]     HTN (hypertension) 3/30/2010    Hypertension     Other and unspecified hyperlipidemia 3/30/2010    Sleep apnea     Type II or unspecified type diabetes mellitus without mention of complication, uncontrolled 3/30/2010      Social History     Socioeconomic History    Marital status: SINGLE   Tobacco Use    Smoking status: Some Days     Packs/day: 0.50     Years: 22.00     Pack years: 11.00     Types: Cigars, Cigarettes    Smokeless tobacco: Never    Tobacco comments:     black and mild   Vaping Use    Vaping Use: Never used   Substance and Sexual Activity    Alcohol use: Yes     Alcohol/week: 10.0 standard drinks     Types: 10 Standard drinks or equivalent per week     Comment: Socially     Drug use: No    Sexual activity: Yes     Partners: Female     Birth control/protection: Condom   Social History Narrative    6/6/17: works at [employer]          Current Outpatient Medications   Medication Sig    sildenafil citrate (Viagra) 100 mg tablet Take 1 Tablet by mouth daily as needed for Erectile Dysfunction. aspirin delayed-release 81 mg tablet TAKE 1 TABLET DAILY    topiramate (TOPAMAX) 25 mg tablet TAKE 1 TABLET BY MOUTH TWICE DAILY    metFORMIN (GLUCOPHAGE) 1,000 mg tablet TAKE 1 TABLET TWICE A DAY WITH MEALS FOR DIABETES    rosuvastatin (CRESTOR) 40 mg tablet TAKE 1 TABLET NIGHTLY    pregabalin (LYRICA) 200 mg capsule Take 1 Capsule by mouth two (2) times a day.  Max Daily Amount: 400 mg.    albuterol (PROVENTIL HFA, VENTOLIN HFA, PROAIR HFA) 90 mcg/actuation inhaler USE 1 INHALATION EVERY 6 HOURS AS NEEDED FOR WHEEZING    enalapril-hydroCHLOROthiazide (VASERETIC) 10-25 mg tablet TAKE 1 TABLET DAILY    ibuprofen (MOTRIN) 800 mg tablet Take 1 Tablet by mouth every eight (8) hours as needed for Pain.    liraglutide (Victoza 3-Demar) 0.6 mg/0.1 mL (18 mg/3 mL) pnij INJECT 1.8 MG UNDER THE SKIN DAILY    insulin detemir U-100 (Levemir FlexTouch U-100 Insuln) 100 unit/mL (3 mL) inpn INJECT 50 UNITS UNDER THE SKIN NIGHTLY    empagliflozin (Jardiance) 10 mg tablet TAKE 1 TABLET DAILY    glucose blood VI test strips strip E11.65 check sugar daily    Insulin Needles, Disposable, (BD Ultra-Fine Short Pen Needle) 31 gauge x 5/16\" ndle USE TO INJECT LEVEMIR AND VICTOZA UNDER THE SKIN TWICE A DAY predniSONE (STERAPRED) 5 mg dose pack See administration instruction per 5mg dose pack    glucose blood VI test strips (OneTouch Verio test strips) strip Check blood sugar three times daily    OneTouch Verio Flex meter misc CHECK BLOOD SUGAR THREE TIMES DAILY    OneTouch Delica Plus Lancet 33 gauge misc CHECK BLOOD SUGAR THREE TIMES DAILY    Blood-Glucose Meter monitoring kit Check sugars TID. E11.65. Once touch    lancets misc Use as directed. Dx: e11.65 check sugars TID    Insulin Needles, Disposable, 30 gauge x 1/3\" USE TO INJECT LEVEMIR AND VICTOZA TWICE DAILY     No current facility-administered medications for this visit. Review of Systems:   Constitutional:    Negative for fever and chills, negative diaphoresis. HEENT:              Negative for neck pain and stiffness. Eyes:                  Negative for visual disturbance, itching, redness or discharge. Respiratory:        Negative for cough and shortness of breath. Cardiovascular:  Negative for chest pain and palpitations. Gastrointestinal: Negative for nausea, vomiting, abdominal pain, diarrhea or constipation. Genitourinary:     Negative for dysuria and frequency. Musculoskeletal: Negative for falls, tenderness and swelling. Skin:                    Negative for rash, masses or lesions. Neurological:       Negative for dizzyness, seizure, loss of consciousness, weakness and numbness.      Objective:     Vitals:    12/21/22 0932   BP: 137/86   Pulse: 79   Resp: 19   Temp: 98.4 °F (36.9 °C)   TempSrc: Temporal   SpO2: 95%   Weight: 347 lb (157.4 kg)   Height: 6' 3\" (1.905 m)     Results for orders placed or performed in visit on 12/21/22   AMB POC HEMOGLOBIN A1C   Result Value Ref Range    Hemoglobin A1c (POC) 8.8 %   AMB POC GLUCOSE BLOOD, BY GLUCOSE MONITORING DEVICE   Result Value Ref Range    Glucose  MG/DL   AMB POC URINE, MICROALBUMIN, SEMIQUANT (3 RESULTS)   Result Value Ref Range    ALBUMIN, URINE POC 30 Negative mg/L CREATININE, URINE  mg/dL    Microalbumin/creat ratio (POC) <30 <30 MG/G         Results for orders placed or performed in visit on 53/15/98   METABOLIC PANEL, COMPREHENSIVE   Result Value Ref Range    Glucose 154 (H) 65 - 99 mg/dL    BUN 15 6 - 24 mg/dL    Creatinine 0.99 0.76 - 1.27 mg/dL    eGFR 92 >59 mL/min/1.73    BUN/Creatinine ratio 15 9 - 20    Sodium 140 134 - 144 mmol/L    Potassium 4.2 3.5 - 5.2 mmol/L    Chloride 101 96 - 106 mmol/L    CO2 25 20 - 29 mmol/L    Calcium 9.4 8.7 - 10.2 mg/dL    Protein, total 6.3 6.0 - 8.5 g/dL    Albumin 4.4 3.8 - 4.9 g/dL    GLOBULIN, TOTAL 1.9 1.5 - 4.5 g/dL    A-G Ratio 2.3 (H) 1.2 - 2.2    Bilirubin, total 0.3 0.0 - 1.2 mg/dL    Alk. phosphatase 78 44 - 121 IU/L    AST (SGOT) 22 0 - 40 IU/L    ALT (SGPT) 18 0 - 44 IU/L   LIPID PANEL   Result Value Ref Range    Cholesterol, total 116 100 - 199 mg/dL    Triglyceride 112 0 - 149 mg/dL    HDL Cholesterol 48 >39 mg/dL    VLDL, calculated 20 5 - 40 mg/dL    LDL, calculated 48 0 - 99 mg/dL   HEMOGLOBIN A1C WITH EAG   Result Value Ref Range    Hemoglobin A1c 8.3 (H) 4.8 - 5.6 %    Estimated average glucose 192 mg/dL   PSA W/ REFLX FREE PSA   Result Value Ref Range    Prostate Specific Ag 0.6 0.0 - 4.0 ng/mL    Reflex Criteria Comment    CVD REPORT   Result Value Ref Range    INTERPRETATION Note        Gen: Oriented to person, place and time and well-developed, well-nourished and in no distress. HEENT:    Head: normocephalic and atraumatic. Eyes:  EOM are normal. Pupils equal and round. Neck:  Normal range of motion. Neck supple. Cardiovascular: normal rate, regular rhythm and normal heart sounds. Pulmonary/Chest:  Effort normal and breath sounds normal.  No respiratory distress. No wheezes, no rales. Abdominal: soft, normal  bowel sounds. Musculoskeletal:  No edema, no tenderness. No calf tenderness or edema. Neurological:  Alert, oriented to person, place and time. Skin: skin is warm and dry. Back of L thigh: non draining abscess noted      Assessment/ Plan:       1. Erectile dysfunction, unspecified erectile dysfunction type    - sildenafil citrate (Viagra) 100 mg tablet; Take 1 Tablet by mouth daily as needed for Erectile Dysfunction. Dispense: 20 Tablet; Refill: 2    2. Essential hypertension      3. Mixed hyperlipidemia      4. Type 2 diabetes mellitus with hyperglycemia, with long-term current use of insulin (HCC)  DM classes should help, needs better diet/med adherence  - AMB POC HEMOGLOBIN A1C  - AMB POC GLUCOSE BLOOD, BY GLUCOSE MONITORING DEVICE  - AMB POC URINE, MICROALBUMIN, SEMIQUANT (3 RESULTS)  - REFERRAL TO DIABETIC EDUCATION    5. Morbid obesity ([facility] [address]      6. Abscess    - chlorhexidine (HIBICLENS) 4 % liquid; Apply 118 mL to affected area daily as needed (prevent boils) for up to 14 days. Dispense: 236 mL; Refill: 0  - amoxicillin-clavulanate (AUGMENTIN) 875-125 mg per tablet; Take 1 Tablet by mouth two (2) times a day for 7 days. Dispense: 14 Tablet; Refill: 0    7. Needs flu shot    - INFLUENZA, FLUARIX, FLULAVAL, FLUZONE (AGE 6 MO+), AFLURIA(AGE 3Y+) IM, PF, 0.5 ML        I have discussed the diagnosis with the patient and the intended plan as seen in the above orders. The patient has received an after-visit summary and questions were answered concerning future plans. Pt conveyed understanding of plan. Medication Side Effects and Warnings were discussed with patient: yes  Patient Labs were reviewed: yes  Patient Past Records were reviewed:  yes    Allegra Townsend.  Ansley Lindsey NP

## 2022-12-22 DIAGNOSIS — M54.16 LUMBAR RADICULOPATHY: ICD-10-CM

## 2022-12-22 DIAGNOSIS — E11.65 TYPE 2 DIABETES MELLITUS WITH HYPERGLYCEMIA, WITH LONG-TERM CURRENT USE OF INSULIN (HCC): ICD-10-CM

## 2022-12-22 DIAGNOSIS — Z79.4 TYPE 2 DIABETES MELLITUS WITH HYPERGLYCEMIA, WITH LONG-TERM CURRENT USE OF INSULIN (HCC): ICD-10-CM

## 2022-12-22 RX ORDER — PREGABALIN 200 MG/1
CAPSULE ORAL
Qty: 60 CAPSULE | Refills: 1 | Status: SHIPPED | OUTPATIENT
Start: 2022-12-22

## 2022-12-22 RX ORDER — PEN NEEDLE, DIABETIC 30 GX3/16"
NEEDLE, DISPOSABLE MISCELLANEOUS
Qty: 100 EACH | Refills: 0 | Status: SHIPPED | OUTPATIENT
Start: 2022-12-22

## 2023-01-05 ENCOUNTER — CLINICAL SUPPORT (OUTPATIENT)
Dept: DIABETES SERVICES | Age: 53
End: 2023-01-05
Payer: COMMERCIAL

## 2023-01-05 DIAGNOSIS — Z79.4 TYPE 2 DIABETES MELLITUS WITH HYPERGLYCEMIA, WITH LONG-TERM CURRENT USE OF INSULIN (HCC): Primary | ICD-10-CM

## 2023-01-05 DIAGNOSIS — E11.65 TYPE 2 DIABETES MELLITUS WITH HYPERGLYCEMIA, WITH LONG-TERM CURRENT USE OF INSULIN (HCC): Primary | ICD-10-CM

## 2023-01-05 PROCEDURE — G0108 DIAB MANAGE TRN  PER INDIV: HCPCS

## 2023-01-05 NOTE — PROGRESS NOTES
[facility]  Diabetes Self-Management Education & Support Program    Reason for Referral: DM 2  Referral Source: Sonam Hernandez., *  Services requested: DSMES       ASSESSMENT    From my perspective, the participant would benefit from [facility] specifically related to reducing risks, healthy eating, monitoring, taking medications, physical activity, healthy coping, and problem solving. Will adapt DSMES program to build on participant's skills score, confidence score, and preparedness score as noted in the Diabetes Skills, Confidence, and Preparedness Index. During the program, we will focus on providing DSMES that specifically addresses participant's interest in healthy eating, taking medications, and physical activity, as shown by their reported readiness to change. The participant would be best served by attending weekly individual sessions. Diabetes Self-Management Education Follow-up Visit: February 6, 2022       Clinical Presentation  Juan Guy is a 46 y.o.  male referred for diabetes self-management education. Participant has Type 2 DM on insulin for 11-20 years. Family history positivefor diabetes.  Patient reports not receiving DSMES services in the past. Most recent A1c value:   Lab Results   Component Value Date/Time    Hemoglobin A1c 8.3 (H) 08/24/2022 08:31 AM    Hemoglobin A1c (POC) 8.8 12/21/2022 10:05 AM       Diabetes-related medical history:    Neurological complications  peripheral neuropathy    Microvascular disease  retinopathy      Diabetes-related medications:  Current dosing:   Key Antihyperglycemic Medications               metFORMIN (GLUCOPHAGE) 1,000 mg tablet TAKE 1 TABLET TWICE A DAY WITH MEALS FOR DIABETES    liraglutide (Victoza 3-Demar) 0.6 mg/0.1 mL (18 mg/3 mL) pnij INJECT 1.8 MG UNDER THE SKIN DAILY    insulin detemir U-100 (Levemir FlexTouch U-100 Insuln) 100 unit/mL (3 mL) inpn INJECT 50 UNITS UNDER THE SKIN NIGHTLY empagliflozin (Jardiance) 10 mg tablet TAKE 1 TABLET DAILY            Blood Pressure Management  Key ACE/ARB Medications               enalapril-hydroCHLOROthiazide (VASERETIC) 10-25 mg tablet TAKE 1 TABLET DAILY            Lipid Management  Key Antihyperlipidemia Meds               rosuvastatin (CRESTOR) 40 mg tablet TAKE 1 TABLET NIGHTLY            Clot Prevention  Key Anti-Platelet Anticoagulant Meds               aspirin delayed-release 81 mg tablet TAKE 1 TABLET DAILY            Learning Assessment  Learning objectives Educator assessment (1/5/2023)   Diabetes Disease Process  The participant can   A) describe diabetes in basic terms;   B) state the type of diabetes they have; &   C) state accepted blood glucose targets. Healthy Eating  The participant can   A) identify carbohydrate foods; &   B) accurately read food labels. Being Active  The participant can  A) state the benefits of physical activity;  B) report their current PA practices;  C) identify PA they would consider incorporating in their lives; &  D) develop an implementation plan. Monitoring  The participant can  A) operate their blood glucose meter; &  B) describe how they log their blood glucoses to share with their provider. Taking Medications  The participant can  A) name their diabetes medications;  B) state the purpose and dose;  C) note side effects; &  D) describe proper storage, disposal & transport (if appropriate). Healthy Coping  The participant can    A) describe their response to diabetes diagnosis; B) describe their specific coping mechanisms;  C) identify supportive people and/or other resources that positively support their diabetes self-care and health. Reducing Risks  The participant can describe the preventive measures used by providers to promote health and prevent diabetes complications.      Problem Solving  The participant can   A) identify signs, symptoms & treatment of hypoglycemia;    B) identify signs, symptoms & treatment of hyperglycemia;  C) describe their sick day plan; &  D) identify BG patterns to discuss with their provider. Yes  Yes  No        Yes  No      Yes  Yes, stationary bike 1-2 times a week  Yes  Yes        Yes  Yes        Yes  No  Yes  Yes        Yes, shocked  Yes, stay busy  Yes, providers        No          Yes  Yes  No  Yes     Characteristics to Learning   Barriers to Learning      None     Favorite Ways to Learn   [] Lecture  [] Slides  [] Reading [x] Video-Internet  [] Cassettes/CDs/MP3's  [] Interactive Small Groups [x] Other one on one       Behavioral Assessment  Current self-care practices  Educator assessment (1/5/2023)   Healthy Eating   Current practices    24-hour Dietary Recall:    Breakfast: skips, first meal 3:30 pm - 4 pm    Lunch/Dinner : morataya cheeseburger with french fries, or salad with ham, turkey & cheese or beef brisket, broccoli, greens, water    Snacks: pork rinds, nuts, pies, cake, sugar free sherbert    Beverages: water, apple juice, sugar free drinks  Alcohol: 5-6 beers a month, occasional cocktail       Would benefit from DSMES related to Healthy Eating: Yes    Eats a carbohydrate controlled diet: No    Stage of change: Action      Being Active  Current practices  How many days during the past week have you performed physical activity where your heart beats faster and your breathing is harder than normal for 30 minutes or more? 1-2 day(s)    How many days in a typical week do you perform activity such as this? 1- 2 day(s)     Would benefit from DSMES related to Being Active: Yes}      Exercises 150 minutes/week: No      Stage of change: Action     Monitoring  Current practices  Do you monitor your blood sugar? Yes    How often do you monitor? 1x/day    What are the range of readings? Checks FBS 90 mg/dL - 220 mg/dL    Do you know your last A1c measurement? Yes    Do you know the meaning of the A1c?  Yes     Would benefit from [facility] related to Monitoring: Yes      Uses BG readings to establish trends and understand BG patterns: Yes      Stage of change: Action       Taking Medication  Current practices  Do you understand what your diabetes medications do? No    How often do you miss doses of your diabetes medications? Misses evening meds fairly often- Levemir, Metformin, & Victoza    Can you afford your diabetes medications? Yes   Would benefit from [facility] related to Taking Medication: Yes      Takes medications consistently to receive full benefit: No      Stage of change: Preparation       Healthy Coping   Current state  Diabetes Skills, Confidence and Preparedness Index:   Total score: 5.3  Skills: 5.1  Confidence: 5.1  Preparedness: 5.7       Would benefit from DSMES related to Healthy Coping: Yes      Identifies specific people, organizations,etc, that actively support their diabetes self-care efforts: Yes      Stage of change: Action     Reducing Risks  Current state  Vaccines:  Influenza:   Immunization History   Administered Date(s) Administered    Influenza, FLUARIX, FLULAVAL, FLUZONE (age 10 mo+) AND AFLURIA, (age 1 y+), PF, 0.5mL 09/11/2017, 09/11/2018, 09/20/2019, 10/29/2020, 11/18/2021, 12/21/2022       Pneumococcal:   Immunization History   Administered Date(s) Administered    Pneumococcal Conjugate PCV20, PF (Prevnar 20) 05/19/2022    Pneumococcal Polysaccharide (PPSV-23) 09/11/2017        Hepatitis: No    Examinations:  Diabetic Foot and Eye Exam HM Status   Topic Date Due    Eye Exam   Due    Diabetic Foot Care  11/18/2022        Dental exam: last appointment was: November 2022    Heart Protection:  BP Readings from Last 2 Encounters:   12/21/22 137/86   07/27/22 128/80        Lab Results   Component Value Date/Time    LDL, calculated 48 08/24/2022 08:31 AM    LDL, calculated 141 (H) 05/06/2010 12:00 AM        Kidney Protection:  Lab Results   Component Value Date/Time    Microalb/Creat ratio (ug/mg creat.) 12.1 03/05/2014 02:03 PM Microalbumin urine (POC) 30 01/28/2013 11:23 AM        Would benefit from [facility] related to Reducing Risks: Yes      Actively participates in decision-making with provider regarding secondary prevention:  Yes      Stage of change: Action   Problem Solving  Current state  Hypoglycemia Management:  What are signs and symptoms of hypoglycemia that you experience: Shaking/trembling    How do you prevent hypoglycemia: consistent meals/snack time    How do you treat hypoglycemia:  drink juice, eat hard candy    Hyperglycemia Management:  What are signs and symptoms of hyperglycemia that you experience: Extreme thirst    How can you prevent hyperglycemia: take medications as instructed and focus on carbohydrate counting/meal planning    Sick Day Management:  What do you do differently on sick days:  Pt reported being unaware of self-management on sick days    Pattern Management:  Do you notice blood glucose patterns when you look at the readings in your meter or logbook? Yes    How do you use the blood glucose readings from your meter or logbook? understand how body responds to meals     Would benefit from [facility] related to Problem Solving: Yes      Articulates appropriate strategies to address hypoglycemia, hyperglycemia, sick day care and BG pattern: No      Stage of change: Action       Note: Content derived from the American Association of Diabetes Educators' Diabetes Education Curriculum: A Guide to Successful Self-Management (3rd edition)      Kerline Arguello RN on 1/5/2023 at 10:40 AM    I have personally reviewed the health record, including provider notes, laboratory data and current medications before making these care and education recommendations. The time spent in this effort is included in the total time.   Total minutes: 30    Overall SCPI score: 5.3 Skills Score: 5.1  Low: Taking Medication(Q2),Reducing Risks(Q5) Confidence Score: 5.1  Low: Healthy DVSTNW(W9) Preparedness Score: 5.7  Low: Taking Medication(Q5)  Healthy Eating Score: 4.8  Low: Confidence(Q1) Taking Medication Score: 3.0  Low: Skills(Q2) Blood Sugar Monitoring Score: 5.8  Low: Confidence(Q6) Reducing Risks Score: 5.0  Low: Skills(Q5)  Problem Solving Score: 6.0  Low: Skills(Q6),Confidence(Q7),Preparedness(Q7) Healthy Coping Score: 6.0  Low: Skills(Q7),Confidence(Q2),Preparedness(Q3) Being Active Score: 6.0  Low: Confidence(Q5),Preparedness(Q2)    Skills/Knowledge Questions  1. I know how to plan meals that have the best balance between carbohydrates, proteins and vegetables. 6  2. I know how my diabetes medications (pills, injectables and/or insulin) work in my body. 2  3. I know when to check my blood sugar if I want to see how my body responded to a meal. 6  4. I know when to check my blood sugars to determine if my medication or insulin doses are correct. 6  5. I know what to do to prevent a low blood sugar when I exercise (either before, during, or after). 2  6. When I am sick, I know what to do differently with my diabetes management. 6  7. I know how stress can affect my diabetes management. 6  8. When I look at my blood sugars over a given week, I can explain what my blood sugar pattern is. 6  9. I know what my target levels are for A1c, blood pressure and cholesterol. 6  Confidence Questions  1. I am confident that I can plan balanced meals and snacks. 2  2. I am confident that I can manage my stress. 6  3. I am confident that I can prevent a low blood sugar during or after exercise. 6  4. I am confident that the next time I eat out, I will be able to choose foods that best keep my blood sugars in target. 5  5. I am confident I can include exercise into my schedule. 6  6. I am confident that I can use my daily blood sugars to adjust my diet, my activity, and/or my insulin. 5  7. When something out of my normal routine happens, I am confident that I can problem-solve and keep my diabetes on track. 6  Preparedness Questions  1.  Within the next month, I will begin to eat more balanced meals and snacks. 6  2. Within the next month, I will choose an exercise activity and I will start fitting it into my schedule. 6  3. Within the next month, I will make a list of stress management options that work for me. 6  4. Within the next month, I will consistently plan ahead to prevent low blood sugars. 6  5. Within the next month, I will start adjusting my insulin doses on my own. 4  6. Within the next month, I will begin making changes to my diabetes management based on my daily blood sugars (eg - eating, activity and/or insulin). 6  7. Within the next month, I will begin making changes to my diabetes management to meet my overall goals (eg - eating, activity and/or insulin).  6

## 2023-02-03 NOTE — PROGRESS NOTES
Chief Complaint   Patient presents with    Follow-up    Cough     x 2-3 weeks, pt states cough is starting to go away pt states he has taken nyquil and mucinex, negative covid test     Letter for School/Work     RTW       1. Have you been to the ER, urgent care clinic since your last visit? Hospitalized since your last visit? No    2. Have you seen or consulted any other health care providers outside of the [address] [place] [address] since your last visit? Include any pap smears or colon screening.  No 5

## 2023-02-06 ENCOUNTER — CLINICAL SUPPORT (OUTPATIENT)
Dept: DIABETES SERVICES | Age: 53
End: 2023-02-06
Payer: COMMERCIAL

## 2023-02-06 DIAGNOSIS — Z79.4 TYPE 2 DIABETES MELLITUS WITH HYPERGLYCEMIA, WITH LONG-TERM CURRENT USE OF INSULIN (HCC): Primary | ICD-10-CM

## 2023-02-06 DIAGNOSIS — E11.65 TYPE 2 DIABETES MELLITUS WITH HYPERGLYCEMIA, WITH LONG-TERM CURRENT USE OF INSULIN (HCC): Primary | ICD-10-CM

## 2023-02-06 PROCEDURE — G0108 DIAB MANAGE TRN  PER INDIV: HCPCS

## 2023-03-06 ENCOUNTER — CLINICAL SUPPORT (OUTPATIENT)
Dept: DIABETES SERVICES | Age: 53
End: 2023-03-06
Payer: COMMERCIAL

## 2023-03-06 DIAGNOSIS — Z79.4 TYPE 2 DIABETES MELLITUS WITH HYPERGLYCEMIA, WITH LONG-TERM CURRENT USE OF INSULIN (HCC): Primary | ICD-10-CM

## 2023-03-06 DIAGNOSIS — E11.65 TYPE 2 DIABETES MELLITUS WITH HYPERGLYCEMIA, WITH LONG-TERM CURRENT USE OF INSULIN (HCC): Primary | ICD-10-CM

## 2023-03-06 PROCEDURE — G0108 DIAB MANAGE TRN  PER INDIV: HCPCS

## 2023-03-06 NOTE — PROGRESS NOTES
[employer] [facility]  Diabetes Self-Management Education & Support Program  Encounter Note    SUMMARY  Diabetes self-care management training was completed related to reducing risks and healthy eating was started. The participant will return on March 20 to continue DSMES related to healthy eating and monitoring. The participant did identify SMART Goal(s) and will practice knowledge and skills related to reducing risks and healthy eating to improve diabetes self-management. EVALUATION:  Mr Clif Frias expressed understanding of the relationship between DM & heart, nerve, sleep, kidney, & eye health. He had a dilated eye exam and needs to see a retina specialist. Shukri Ellison has sleep apnea and uses CPAP every night. He needs a podiatry appointment & is looking for a podiatrist near his home. Shukri Ellison expressed understanding of using healthy plate to build balanced meals, manage portions & reading nutrition facts labels to make more informed food choices. RECOMMENDATIONS:  Schedule appointment with retina specialist. Focus on SMART goal: Use healthy plate to build balanced, CHO controlled meals 3 times a week. TOPICS DISCUSSED TODAY:  HOW DO I STAY HEALTHY? 30  WHAT CAN I EAT? 30      Next provider visit is scheduled for March 29, 2023       SMART GOAL(S)   Use healthy plate for 3 meals a week  ( goal set 3/6/23)  ACHIEVEMENT OF GOAL(S) : 0-24%       DATE DSMES TOPIC EVALUATION     3/6/2023 HOW DO I STAY HEALTHY? Prevention   Vaccinations   Preconception care (if applicable)  Examinations   Eye    Foot   Diabetic complications' prevention   Dental health   Heart health   Kidney Health   Nerve health   Sleep health      The participant has a personal diabetes care record to keep abreast of diabetes health No     The participant needs to address keeping track of DM health using a personal care record. DATE DSMES TOPIC EVALUATION     3/6/2023 WHAT CAN I EAT?    General principles Determining a healthy weight   Nutritional terms & tools   Healthy Plate method   Carbohydrate Counting   Reading food labels   Free apps   Pregnancy recommendations      The participant   Uses Healthy Plate principles in constructing meals: Yes  Reads food labels in choosing acceptable foods: Yes    The participant needs to address practice CHO counting. Marian Mccoy RN on 3/6/2023 at 3:18 PM    I have personally reviewed the health record, including provider notes, laboratory data and current medications before making these care and education recommendations. The time spent in this effort is included in the total time.   Total minutes: 60

## 2023-03-16 DIAGNOSIS — E11.65 TYPE 2 DIABETES MELLITUS WITH HYPERGLYCEMIA, WITH LONG-TERM CURRENT USE OF INSULIN (HCC): ICD-10-CM

## 2023-03-16 DIAGNOSIS — Z79.4 TYPE 2 DIABETES MELLITUS WITH HYPERGLYCEMIA, WITH LONG-TERM CURRENT USE OF INSULIN (HCC): ICD-10-CM

## 2023-03-16 DIAGNOSIS — M54.16 LUMBAR RADICULOPATHY: ICD-10-CM

## 2023-03-17 RX ORDER — IBUPROFEN 800 MG/1
TABLET ORAL
Qty: 90 TABLET | Refills: 0 | Status: SHIPPED | OUTPATIENT
Start: 2023-03-17

## 2023-03-17 RX ORDER — PEN NEEDLE, DIABETIC 30 GX3/16"
NEEDLE, DISPOSABLE MISCELLANEOUS
Qty: 100 EACH | Refills: 0 | Status: SHIPPED | OUTPATIENT
Start: 2023-03-17

## 2023-03-29 ENCOUNTER — OFFICE VISIT (OUTPATIENT)
Dept: INTERNAL MEDICINE CLINIC | Age: 53
End: 2023-03-29
Payer: COMMERCIAL

## 2023-03-29 VITALS
HEIGHT: 75 IN | BODY MASS INDEX: 39.17 KG/M2 | WEIGHT: 315 LBS | HEART RATE: 87 BPM | TEMPERATURE: 97.6 F | DIASTOLIC BLOOD PRESSURE: 85 MMHG | SYSTOLIC BLOOD PRESSURE: 130 MMHG | RESPIRATION RATE: 18 BRPM | OXYGEN SATURATION: 100 %

## 2023-03-29 DIAGNOSIS — Z91.018 MULTIPLE FOOD ALLERGIES: ICD-10-CM

## 2023-03-29 DIAGNOSIS — I10 ESSENTIAL HYPERTENSION: ICD-10-CM

## 2023-03-29 DIAGNOSIS — Z79.4 TYPE 2 DIABETES MELLITUS WITH HYPERGLYCEMIA, WITH LONG-TERM CURRENT USE OF INSULIN (HCC): Primary | ICD-10-CM

## 2023-03-29 DIAGNOSIS — M26.621 ARTHRALGIA OF RIGHT TEMPOROMANDIBULAR JOINT: ICD-10-CM

## 2023-03-29 DIAGNOSIS — E11.65 TYPE 2 DIABETES MELLITUS WITH HYPERGLYCEMIA, WITH LONG-TERM CURRENT USE OF INSULIN (HCC): Primary | ICD-10-CM

## 2023-03-29 DIAGNOSIS — H93.11 TINNITUS OF RIGHT EAR: ICD-10-CM

## 2023-03-29 LAB
GLUCOSE POC: 126 MG/DL
HBA1C MFR BLD HPLC: 7.6 %

## 2023-03-29 PROCEDURE — 99214 OFFICE O/P EST MOD 30 MIN: CPT | Performed by: NURSE PRACTITIONER

## 2023-03-29 PROCEDURE — 82962 GLUCOSE BLOOD TEST: CPT | Performed by: NURSE PRACTITIONER

## 2023-03-29 PROCEDURE — 83036 HEMOGLOBIN GLYCOSYLATED A1C: CPT | Performed by: NURSE PRACTITIONER

## 2023-03-29 RX ORDER — CELECOXIB 200 MG/1
CAPSULE ORAL
COMMUNITY
Start: 2023-03-25 | End: 2023-03-29

## 2023-03-29 RX ORDER — FLUTICASONE PROPIONATE 50 MCG
2 SPRAY, SUSPENSION (ML) NASAL DAILY
Qty: 1 EACH | Refills: 0 | Status: SHIPPED | OUTPATIENT
Start: 2023-03-29

## 2023-03-29 RX ORDER — EPINEPHRINE 0.3 MG/.3ML
0.3 INJECTION SUBCUTANEOUS
Qty: 2 EACH | Refills: 0 | Status: SHIPPED | OUTPATIENT
Start: 2023-03-29 | End: 2023-03-29

## 2023-03-29 NOTE — PROGRESS NOTES
Pt is here for   Chief Complaint   Patient presents with    Follow-up     3 month follow up     Ringing in Ear     Of the right ear      1. \"Have you been to the ER, urgent care clinic since your last visit? Hospitalized since your last visit? \" No    2. \"Have you seen or consulted any other health care providers outside of the [address] [place] [address] since your last visit? \" No     3. For patients aged 39-70: Has the patient had a colonoscopy / FIT/ Cologuard? Yes - Care Gap present. Most recent result on file      If the patient is female:    4. For patients aged 41-77: Has the patient had a mammogram within the past 2 years? NA - based on age or sex      11. For patients aged 21-65: Has the patient had a pap smear?  NA - based on age or sex

## 2023-03-29 NOTE — PROGRESS NOTES
Subjective: (As above and below)     Chief Complaint   Patient presents with    Follow-up     3 month follow up     Ringing in Ear     Of the right ear      Randee Jimenez is a 46y.o. year old male who presents for     Diabetic Review of Systems - medication compliance: compliant all of the time, diabetic diet compliance: compliant most of the time, home glucose monitoring: is performed regularly. Has been going to DM education and is finding it helpful    Lab Results   Component Value Date/Time    Hemoglobin A1c 8.3 (H) 08/24/2022 08:31 AM        Hypertension ROS:  taking medications as instructed, no medication side effects noted, no TIAs, no chest pain on exertion, no dyspnea on exertion, no swelling of ankles    Hyperlipidemia tolerating statin    Weight  Wt Readings from Last 3 Encounters:   03/29/23 344 lb (156 kg)   12/21/22 347 lb (157.4 kg)   07/27/22 341 lb 7.9 oz (154.9 kg)       ED: tolerating viagra w/o a/e    Food allergies: shrimp, maybe peanuts- seems that more foods are causing him problems- rashes, throat itching    R ear ringing: intermittent also notes a warm, slightly painful sensation some of the time when he chews, pain to R jaw, ear, temple  No swelling      Reviewed PmHx, RxHx, FmHx, SocHx, AllgHx and updated in chart.   Family History   Problem Relation Age of Onset    Diabetes Mother         cancer, liver? hep c    Hypertension Mother     Hypertension Father     Diabetes Brother     Hypertension Brother     Hypertension Maternal Uncle     Diabetes Maternal Grandmother     Diabetes Paternal Grandmother     Hypertension Maternal Uncle     Hypertension Maternal Uncle     Diabetes Paternal Uncle        Past Medical History:   Diagnosis Date    Asthma     Diabetes ([facility] [address]     HTN (hypertension) 3/30/2010    Hypertension     Other and unspecified hyperlipidemia 3/30/2010    Sleep apnea     Type II or unspecified type diabetes mellitus without mention of complication, uncontrolled 3/30/2010 Social History     Socioeconomic History    Marital status: SINGLE   Tobacco Use    Smoking status: Some Days     Packs/day: 0.50     Years: 22.00     Pack years: 11.00     Types: Cigars, Cigarettes     Passive exposure: Current    Smokeless tobacco: Never    Tobacco comments:     black and mild   Vaping Use    Vaping Use: Never used   Substance and Sexual Activity    Alcohol use: Yes     Alcohol/week: 10.0 standard drinks     Types: 10 Standard drinks or equivalent per week     Comment: Socially     Drug use: No    Sexual activity: Yes     Partners: Female     Birth control/protection: Condom   Social History Narrative    6/6/17: works at [employer] Strain: Low Risk     Difficulty of Paying Living Expenses: Not very hard   Food Insecurity: No Food Insecurity    Worried About [address] [place] [address] in the Last Year: Never true    Ran Out of Food in the Last Year: Never true          Current Outpatient Medications   Medication Sig    fluticasone propionate (FLONASE) 50 mcg/actuation nasal spray 2 Sprays by Both Nostrils route daily. ibuprofen (MOTRIN) 800 mg tablet TAKE 1 TABLET BY MOUTH EVERY 8 HOURS WITH FOOD AS NEEDED FOR PAIN    pregabalin (LYRICA) 200 mg capsule TAKE 1 CAPSULE BY MOUTH TWICE DAILY. MAX DAILY AMOUNT: 400 MG    sildenafil citrate (Viagra) 100 mg tablet Take 1 Tablet by mouth daily as needed for Erectile Dysfunction.     metFORMIN (GLUCOPHAGE) 1,000 mg tablet TAKE 1 TABLET TWICE A DAY WITH MEALS FOR DIABETES    rosuvastatin (CRESTOR) 40 mg tablet TAKE 1 TABLET NIGHTLY    albuterol (PROVENTIL HFA, VENTOLIN HFA, PROAIR HFA) 90 mcg/actuation inhaler USE 1 INHALATION EVERY 6 HOURS AS NEEDED FOR WHEEZING    enalapril-hydroCHLOROthiazide (VASERETIC) 10-25 mg tablet TAKE 1 TABLET DAILY    liraglutide (Victoza 3-Demar) 0.6 mg/0.1 mL (18 mg/3 mL) pnij INJECT 1.8 MG UNDER THE SKIN DAILY    insulin detemir U-100 (Levemir FlexTouch U-100 Insuln) 100 unit/mL (3 mL) inpn INJECT 50 UNITS UNDER THE SKIN NIGHTLY    empagliflozin (Jardiance) 10 mg tablet TAKE 1 TABLET DAILY    Insulin Needles, Disposable, (BD Ultra-Fine Short Pen Needle) 31 gauge x 5/16\" ndle USE NEEDLE WITH LEVEMIR AND VICTOZA UNDER THE SKIN TWICE DAILY    aspirin delayed-release 81 mg tablet TAKE 1 TABLET DAILY    glucose blood VI test strips strip E11.65 check sugar daily    glucose blood VI test strips (OneTouch Verio test strips) strip Check blood sugar three times daily    OneTouch Verio Flex meter misc CHECK BLOOD SUGAR THREE TIMES DAILY    OneTouch Delica Plus Lancet 33 gauge misc CHECK BLOOD SUGAR THREE TIMES DAILY    Blood-Glucose Meter monitoring kit Check sugars TID. E11.65. Once touch    lancets misc Use as directed. Dx: e11.65 check sugars TID    Insulin Needles, Disposable, 30 gauge x 1/3\" USE TO INJECT LEVEMIR AND VICTOZA TWICE DAILY     No current facility-administered medications for this visit. Review of Systems:   Constitutional:    Negative for fever and chills, negative diaphoresis. HEENT:              Negative for neck pain and stiffness. Eyes:                  Negative for visual disturbance, itching, redness or discharge. Respiratory:        Negative for cough and shortness of breath. Cardiovascular:  Negative for chest pain and palpitations. Gastrointestinal: Negative for nausea, vomiting, abdominal pain, diarrhea or constipation. Genitourinary:     Negative for dysuria and frequency. Musculoskeletal: Negative for falls, tenderness and swelling. Skin:                    Negative for rash, masses or lesions. Neurological:       Negative for dizzyness, seizure, loss of consciousness, weakness and numbness.      Objective:     Vitals:    03/29/23 0907   BP: 130/85   Pulse: 87   Resp: 18   Temp: 97.6 °F (36.4 °C)   TempSrc: Temporal   SpO2: 100%   Weight: 344 lb (156 kg)   Height: 6' 3\" (1.905 m)       Results for orders placed or performed in visit on 03/29/23   AMB POC GLUCOSE BLOOD, BY GLUCOSE MONITORING DEVICE   Result Value Ref Range    Glucose  MG/DL   AMB POC HEMOGLOBIN A1C   Result Value Ref Range    Hemoglobin A1c (POC) 7.6 %         Gen: Oriented to person, place and time and well-developed, well-nourished and in no distress. HEENT:    Head: normocephalic and atraumatic. R ear: clear fluid bulge behind drum  Eyes:  EOM are normal. Pupils equal and round. Neck:  Normal range of motion. Neck supple. Cardiovascular: normal rate, regular rhythm and normal heart sounds. Pulmonary/Chest:  Effort normal and breath sounds normal.  No respiratory distress. No wheezes, no rales. Abdominal: soft, normal  bowel sounds. Musculoskeletal:  No edema, no tenderness. No calf tenderness or edema. Neurological:  Alert, oriented to person, place and time. Skin: skin is warm and dry. Assessment/ Plan:     1. Type 2 diabetes mellitus with hyperglycemia, with long-term current use of insulin (HCC)  Improving!  - AMB POC GLUCOSE BLOOD, BY GLUCOSE MONITORING DEVICE  - AMB POC HEMOGLOBIN A1C    2. Essential hypertension    - AMB POC GLUCOSE BLOOD, BY GLUCOSE MONITORING DEVICE  - AMB POC HEMOGLOBIN A1C    3. Multiple food allergies    - REFERRAL TO ALLERGY  - EPINEPHrine (EPIPEN) 0.3 mg/0.3 mL injection; 0.3 mL by IntraMUSCular route once as needed for Allergic Response or Anaphylaxis for up to 1 dose. Dispense: 2 Each; Refill: 0    4. Arthralgia of right temporomandibular joint  ? TMJ, discussed avoiding gum, hard foods, try ibup  Fu INI    5. Tinnitus of right ear  Recc flonase x 2 weeks          I have discussed the diagnosis with the patient and the intended plan as seen in the above orders. The patient has received an after-visit summary and questions were answered concerning future plans. Pt conveyed understanding of plan.       Medication Side Effects and Warnings were discussed with patient: yes  Patient Labs were reviewed: yes  Patient Past Records were reviewed:  yes    Cesario Ellis.  Columba Dasilva, NP

## 2023-04-27 RX ORDER — FLUTICASONE PROPIONATE 50 MCG
SPRAY, SUSPENSION (ML) NASAL
Qty: 16 G | Refills: 0 | Status: SHIPPED | OUTPATIENT
Start: 2023-04-27

## 2023-05-12 RX ORDER — PEN NEEDLE, DIABETIC 31 GX5/16"
NEEDLE, DISPOSABLE MISCELLANEOUS
Qty: 100 EACH | Refills: 1 | Status: SHIPPED | OUTPATIENT
Start: 2023-05-12

## 2023-07-07 RX ORDER — EMPAGLIFLOZIN 10 MG/1
TABLET, FILM COATED ORAL
Qty: 90 TABLET | Refills: 3 | Status: SHIPPED | OUTPATIENT
Start: 2023-07-07

## 2023-07-07 RX ORDER — ENALAPRIL MALEATE AND HYDROCHLOROTHIAZIDE 10; 25 MG/1; MG/1
TABLET ORAL
Qty: 90 TABLET | Refills: 3 | Status: SHIPPED | OUTPATIENT
Start: 2023-07-07

## 2023-07-12 NOTE — PROGRESS NOTES
Ramiro RUDOLPHO.B.:  1951    Acct Number: [de-identified]   MRN:  [id number]                             [facility] HEALTHY MIND-SET WORKSHOP             Date: 2023        Session #________    Sharyn Leblanc class covered:    [x]  New Thoughts New Behaviors Workshop:  Patient will learn and practice techniques for developing effective health and lifestyle goals. Patient will be able to effectively apply the goal setting process learned to develop at least one new personal goal.     []  Managing Moods & Relationships Workshop:  Patient will learn how emotional and chronic stress factors can impact their hearts. They will learn healthy ways to handle stress and utilize positive coping mechanisms. In addition, [facility] patient will learn ways to improve communication skills. []  Healthy Sleep for a healthy Heart:  Patients will learn the importance of sleep to overall health, well-being, and quality of life. They will understand the symptoms of, and treatments for, common sleep disorders. Patients will also be able to identify daytime and nighttime behaviors which impact sleep, and they will be able to apply these tools to help manage sleep-related challenges. []  Recognizing and Reducing Stress:  Patients will learn about stress and how to recognize stress. Patients will gain insight into the toll that chronic stress takes on their health, both emotionally and physically. Patients will learn and practice a variety of stress management techniques. Patients will be able to effectively apply coping mechanisms in perceived stressful situations. Patient actively participated and verbalized understanding. Total time in the Healthy Mind-Set class was 60 minutes.     Electronically signed by DHEERAJ Vigil on 2023 at 4:15 PM [facility]  Diabetes Self-Management Education & Support Program  Encounter Note    SUMMARY  Diabetes self-care management training was completed related to What is Diabetes? And  reducing risks was started. The participant will return on March 6 to continue DSMES related to reducing risks and healthy eating. The participant did not identify SMART Goal(s) and will practice knowledge and skills related to reducing risks to improve diabetes self-management. EVALUATION:  Mr Deena Kaur expressed understanding of T 2 DM disease process & the ADA targets for BG & A1c. He expressed understanding of the role of vaccinations, eye, dental & foot exams in preventing DM complications. Vadim Chaudhari shared is checking FBS ranging 120 mg/dL - 180 mg/dL. He is up to date on Covid, Flu & Pneumonia vaccines, needs to schedule a dilated eye exam.    RECOMMENDATIONS:  Track self care practices to include BG, meals & exercise. TOPICS DISCUSSED TODAY:  WHAT IS DIABETES? Minutes: Erin? 30      Next provider visit is scheduled for March 29, 2023       DATE DSMES TOPIC EVALUATION     2/6/2023 WHAT IS DIABETES? Role of the normal pancreas in energy balance and blood glucose control   The defect seen in diabetes   Signs & symptoms of diabetes   Diagnosis of diabetes   Types of diabetes   Blood glucose targets in non-pregnant & non-pregnant adults       The participant knows  Their type of diabetes: Yes  The basic physiologic defect: Yes  Blood glucose targets: Yes     DATE DSMES TOPIC EVALUATION     2/6/2023 HOW DO I STAY HEALTHY?    Prevention   Vaccinations   Preconception care (if applicable)  Examinations   Eye    Foot   Diabetic complications' prevention   Dental health   Heart health   Kidney Health   Nerve health   Sleep health      The participant has a personal diabetes care record to keep abreast of diabetes health Yes                Jeevan Cortes RN on 2/6/2023 at 12:48 PM    I have personally reviewed the health record, including provider notes, laboratory data and current medications before making these care and education recommendations. The time spent in this effort is included in the total time.   Total minutes: 60

## 2023-09-21 ENCOUNTER — HOSPITAL ENCOUNTER (EMERGENCY)
Facility: HOSPITAL | Age: 53
Discharge: HOME | End: 2023-09-21
Attending: EMERGENCY MEDICINE
Payer: COMMERCIAL

## 2023-09-21 VITALS
TEMPERATURE: 98.1 F | OXYGEN SATURATION: 95 % | RESPIRATION RATE: 20 BRPM | HEIGHT: 75 IN | WEIGHT: 315 LBS | BODY MASS INDEX: 39.17 KG/M2 | SYSTOLIC BLOOD PRESSURE: 154 MMHG | DIASTOLIC BLOOD PRESSURE: 94 MMHG | HEART RATE: 92 BPM

## 2023-09-21 DIAGNOSIS — S39.012A LUMBOSACRAL STRAIN, INITIAL ENCOUNTER: ICD-10-CM

## 2023-09-21 DIAGNOSIS — M54.50 ACUTE LEFT-SIDED LOW BACK PAIN WITHOUT SCIATICA: Primary | ICD-10-CM

## 2023-09-21 PROCEDURE — 99283 EMERGENCY DEPT VISIT LOW MDM: CPT

## 2023-09-21 PROCEDURE — 6370000000 HC RX 637 (ALT 250 FOR IP): Performed by: EMERGENCY MEDICINE

## 2023-09-21 RX ORDER — IBUPROFEN 800 MG/1
800 TABLET ORAL 2 TIMES DAILY PRN
Qty: 30 TABLET | Refills: 0 | Status: SHIPPED | OUTPATIENT
Start: 2023-09-21

## 2023-09-21 RX ORDER — PREDNISONE 20 MG/1
60 TABLET ORAL
Status: COMPLETED | OUTPATIENT
Start: 2023-09-21 | End: 2023-09-21

## 2023-09-21 RX ORDER — LIDOCAINE 4 G/G
1 PATCH TOPICAL
Status: DISCONTINUED | OUTPATIENT
Start: 2023-09-21 | End: 2023-09-21 | Stop reason: HOSPADM

## 2023-09-21 RX ORDER — IBUPROFEN 400 MG/1
800 TABLET ORAL
Status: COMPLETED | OUTPATIENT
Start: 2023-09-21 | End: 2023-09-21

## 2023-09-21 RX ORDER — ACETAMINOPHEN 500 MG
1000 TABLET ORAL
Status: COMPLETED | OUTPATIENT
Start: 2023-09-21 | End: 2023-09-21

## 2023-09-21 RX ORDER — LIDOCAINE 50 MG/G
1 PATCH TOPICAL DAILY
Qty: 30 PATCH | Refills: 0 | Status: SHIPPED | OUTPATIENT
Start: 2023-09-21 | End: 2023-10-21

## 2023-09-21 RX ORDER — PREDNISONE 50 MG/1
50 TABLET ORAL DAILY
Qty: 5 TABLET | Refills: 0 | Status: SHIPPED | OUTPATIENT
Start: 2023-09-21 | End: 2023-09-26

## 2023-09-21 RX ORDER — CYCLOBENZAPRINE HCL 10 MG
10 TABLET ORAL 3 TIMES DAILY PRN
Qty: 21 TABLET | Refills: 0 | Status: SHIPPED | OUTPATIENT
Start: 2023-09-21 | End: 2023-10-01

## 2023-09-21 RX ADMIN — PREDNISONE 60 MG: 20 TABLET ORAL at 02:05

## 2023-09-21 RX ADMIN — ACETAMINOPHEN 1000 MG: 500 TABLET ORAL at 02:05

## 2023-09-21 RX ADMIN — IBUPROFEN 800 MG: 400 TABLET, FILM COATED ORAL at 02:05

## 2023-09-21 ASSESSMENT — ENCOUNTER SYMPTOMS
SHORTNESS OF BREATH: 0
NAUSEA: 0
COUGH: 0
BACK PAIN: 1
VOMITING: 0
COLOR CHANGE: 0
ABDOMINAL PAIN: 0

## 2023-09-21 ASSESSMENT — PAIN DESCRIPTION - DESCRIPTORS: DESCRIPTORS: SHARP;OTHER (COMMENT)

## 2023-09-21 ASSESSMENT — PAIN DESCRIPTION - ONSET: ONSET: PROGRESSIVE

## 2023-09-21 ASSESSMENT — PAIN DESCRIPTION - PAIN TYPE: TYPE: ACUTE PAIN

## 2023-09-21 ASSESSMENT — PAIN DESCRIPTION - DIRECTION: RADIATING_TOWARDS: INNER THIGHS

## 2023-09-21 ASSESSMENT — PAIN DESCRIPTION - FREQUENCY: FREQUENCY: CONTINUOUS

## 2023-09-21 ASSESSMENT — PAIN DESCRIPTION - ORIENTATION: ORIENTATION: LOWER

## 2023-09-21 ASSESSMENT — PAIN SCALES - GENERAL: PAINLEVEL_OUTOF10: 10

## 2023-09-21 ASSESSMENT — PAIN DESCRIPTION - LOCATION: LOCATION: BACK

## 2023-09-21 NOTE — DISCHARGE INSTRUCTIONS
It was a pleasure taking care of you in our Emergency Department today. We know that when you come to [facility], you are entrusting us with your health, comfort, and safety. Our physicians and nurses honor that trust, and truly appreciate the opportunity to care for you and your loved ones. We also value your feedback. If you receive a survey about your Emergency Department experience today, please fill it out. We care about our patients' feedback, and we listen to what you have to say.   Thank you!       --- Dr. Jaclyn Wood MD

## 2023-09-21 NOTE — ED PROVIDER NOTES
[facility] EMERGENCY DEPT  EMERGENCY DEPARTMENT ENCOUNTER         Pt Name: Pino Velez  MRN: [id number]  [address] [facility] 1970  Date of evaluation: 9/21/2023  Provider: Devan Almaraz MD   PCP: PARVIN Shaikh NP  Note Started: 1:55 AM 9/21/23     CHIEF COMPLAINT       Chief Complaint   Patient presents with    Back Pain     Lower back pain, states there is closing of his spinal canal noted in 2021. Pain started on Monday and has increased since. Previous occurrence resolved but symptoms have not improved since onset. Denies fall or other injury. HISTORY OF PRESENT ILLNESS: 1 or more elements      History From: Patient  HPI Limitations: None     Pino Velez is a 48 y.o. male who presents with a dull but worsening left sided low back pain since Saturday. Feels like he may have slept wrong because the pain gradually started one morning but also continued to worsen since. No heavy lifting, trauma, falls. History of spinal stenosis and lumbar radiculopathy. Tried ibuprofen without relief  Pain radiates to left buttock, not down either extremity. No weakness or numbness in either leg. Normal gait  Please see more comprehensive history below under MDM  Nursing Notes were all reviewed in real time as they are made available. Any disagreements addressed in the HPI/MDM. REVIEW OF SYSTEMS      Review of Systems   Constitutional:  Negative for fever. Eyes:  Negative for visual disturbance. Respiratory:  Negative for cough and shortness of breath. Cardiovascular:  Negative for chest pain. Gastrointestinal:  Negative for abdominal pain, nausea and vomiting. Genitourinary:  Negative for dysuria, flank pain and hematuria. Musculoskeletal:  Positive for back pain. Negative for arthralgias and gait problem. Skin:  Negative for color change. Neurological:  Negative for syncope. Psychiatric/Behavioral:  Negative for confusion. Positives and Pertinent negatives as per HPI and MDM.     PAST

## 2023-09-29 RX ORDER — PEN NEEDLE, DIABETIC 31 GX5/16"
NEEDLE, DISPOSABLE MISCELLANEOUS
Qty: 100 EACH | Refills: 1 | Status: SHIPPED | OUTPATIENT
Start: 2023-09-29

## 2023-10-12 ENCOUNTER — OFFICE VISIT (OUTPATIENT)
Facility: CLINIC | Age: 53
End: 2023-10-12
Payer: COMMERCIAL

## 2023-10-12 VITALS
RESPIRATION RATE: 17 BRPM | HEART RATE: 96 BPM | TEMPERATURE: 98.3 F | BODY MASS INDEX: 39.17 KG/M2 | HEIGHT: 75 IN | DIASTOLIC BLOOD PRESSURE: 81 MMHG | SYSTOLIC BLOOD PRESSURE: 115 MMHG | WEIGHT: 315 LBS | OXYGEN SATURATION: 94 %

## 2023-10-12 DIAGNOSIS — R35.1 NOCTURIA: ICD-10-CM

## 2023-10-12 DIAGNOSIS — E11.65 TYPE 2 DIABETES MELLITUS WITH HYPERGLYCEMIA, WITH LONG-TERM CURRENT USE OF INSULIN (HCC): ICD-10-CM

## 2023-10-12 DIAGNOSIS — E66.01 OBESITY, CLASS III, BMI 40-49.9 (MORBID OBESITY) (HCC): ICD-10-CM

## 2023-10-12 DIAGNOSIS — M54.16 LUMBAR RADICULOPATHY: ICD-10-CM

## 2023-10-12 DIAGNOSIS — Z23 IMMUNIZATION DUE: ICD-10-CM

## 2023-10-12 DIAGNOSIS — Z79.4 TYPE 2 DIABETES MELLITUS WITH HYPERGLYCEMIA, WITH LONG-TERM CURRENT USE OF INSULIN (HCC): ICD-10-CM

## 2023-10-12 DIAGNOSIS — Z79.4 TYPE 2 DIABETES MELLITUS WITH HYPERGLYCEMIA, WITH LONG-TERM CURRENT USE OF INSULIN (HCC): Primary | ICD-10-CM

## 2023-10-12 DIAGNOSIS — E11.65 TYPE 2 DIABETES MELLITUS WITH HYPERGLYCEMIA, WITH LONG-TERM CURRENT USE OF INSULIN (HCC): Primary | ICD-10-CM

## 2023-10-12 LAB — HBA1C MFR BLD: 8.9 %

## 2023-10-12 PROCEDURE — 90674 CCIIV4 VAC NO PRSV 0.5 ML IM: CPT | Performed by: NURSE PRACTITIONER

## 2023-10-12 PROCEDURE — 99214 OFFICE O/P EST MOD 30 MIN: CPT | Performed by: NURSE PRACTITIONER

## 2023-10-12 PROCEDURE — 90471 IMMUNIZATION ADMIN: CPT | Performed by: NURSE PRACTITIONER

## 2023-10-12 PROCEDURE — 3078F DIAST BP <80 MM HG: CPT | Performed by: NURSE PRACTITIONER

## 2023-10-12 PROCEDURE — 3074F SYST BP LT 130 MM HG: CPT | Performed by: NURSE PRACTITIONER

## 2023-10-12 PROCEDURE — 83036 HEMOGLOBIN GLYCOSYLATED A1C: CPT | Performed by: NURSE PRACTITIONER

## 2023-10-12 SDOH — ECONOMIC STABILITY: HOUSING INSECURITY
IN THE LAST 12 MONTHS, WAS THERE A TIME WHEN YOU DID NOT HAVE A STEADY PLACE TO SLEEP OR SLEPT IN A SHELTER (INCLUDING NOW)?: NO

## 2023-10-12 SDOH — ECONOMIC STABILITY: FOOD INSECURITY: WITHIN THE PAST 12 MONTHS, THE FOOD YOU BOUGHT JUST DIDN'T LAST AND YOU DIDN'T HAVE MONEY TO GET MORE.: NEVER TRUE

## 2023-10-12 SDOH — ECONOMIC STABILITY: INCOME INSECURITY: HOW HARD IS IT FOR YOU TO PAY FOR THE VERY BASICS LIKE FOOD, HOUSING, MEDICAL CARE, AND HEATING?: NOT HARD AT ALL

## 2023-10-12 SDOH — ECONOMIC STABILITY: FOOD INSECURITY: WITHIN THE PAST 12 MONTHS, YOU WORRIED THAT YOUR FOOD WOULD RUN OUT BEFORE YOU GOT MONEY TO BUY MORE.: NEVER TRUE

## 2023-10-12 ASSESSMENT — PATIENT HEALTH QUESTIONNAIRE - PHQ9
2. FEELING DOWN, DEPRESSED OR HOPELESS: 0
SUM OF ALL RESPONSES TO PHQ QUESTIONS 1-9: 0
1. LITTLE INTEREST OR PLEASURE IN DOING THINGS: 0
SUM OF ALL RESPONSES TO PHQ QUESTIONS 1-9: 0
SUM OF ALL RESPONSES TO PHQ9 QUESTIONS 1 & 2: 0

## 2023-10-12 NOTE — PROGRESS NOTES
Subjective: (As above and below)     Chief Complaint   Patient presents with    Annual Exam    Back Pain     Pt steroid injections in back 10/9/23; pt states back complications has caused issues with his right leg, \"I can't use my right leg\"; Erica Garcia is a 48y.o. year old male who presents for       Diabetic Review of Systems - medication compliance: compliant all of the time, diabetic diet compliance: compliant most of the time, home glucose monitoring: is performed sporadically. Eye exam       Hemoglobin A1C   Date Value Ref Range Status   08/24/2022 8.3 (H) 4.8 - 5.6 % Final     Comment:              Prediabetes: 5.7 - 6.4           Diabetes: >6.4           Glycemic control for adults with diabetes: <7.0        Hypertension ROS:  taking medications as instructed, no medication side effects noted, no TIAs, no chest pain on exertion, no dyspnea on exertion, no swelling of ankles    BP Readings from Last 3 Encounters:   10/12/23 115/81   09/21/23 (!) 154/94   03/29/23 130/85       Lumbar radiculopathy: sees ortho, had injection on 10/9 which has not seemed to help yet  He has fu on Monday    He has been losing weight! Diet improvements    Wt Readings from Last 3 Encounters:   10/12/23 (!) 322 lb 3.2 oz (146.1 kg)   09/21/23 (!) 338 lb 6.5 oz (153.5 kg)   03/29/23 (!) 344 lb (156 kg)         Reviewed PmHx, RxHx, FmHx, SocHx, AllgHx and updated in chart.   Family History   Problem Relation Age of Onset    Diabetes Paternal Uncle     Hypertension Maternal Uncle     Diabetes Maternal Grandmother     Diabetes Paternal Grandmother     Hypertension Maternal Uncle     Hypertension Maternal Uncle     Hypertension Brother     Diabetes Brother     Hypertension Father     Hypertension Mother     Diabetes Mother         cancer, liver? hep c       Past Medical History:   Diagnosis Date    Asthma     Diabetes ([address])     HTN (hypertension) 3/30/2010    Hypertension     Other and unspecified hyperlipidemia 3/30/2010

## 2023-10-13 LAB
ALBUMIN SERPL-MCNC: 4.6 G/DL (ref 3.8–4.9)
ALBUMIN/GLOB SERPL: 2.3 {RATIO} (ref 1.2–2.2)
ALP SERPL-CCNC: 99 IU/L (ref 44–121)
ALT SERPL-CCNC: 16 IU/L (ref 0–44)
AST SERPL-CCNC: 17 IU/L (ref 0–40)
BASOPHILS # BLD AUTO: 0.1 X10E3/UL (ref 0–0.2)
BASOPHILS NFR BLD AUTO: 0 %
BILIRUB SERPL-MCNC: 0.5 MG/DL (ref 0–1.2)
BUN SERPL-MCNC: 19 MG/DL (ref 6–24)
BUN/CREAT SERPL: 19 (ref 9–20)
CALCIUM SERPL-MCNC: 10.3 MG/DL (ref 8.7–10.2)
CHLORIDE SERPL-SCNC: 98 MMOL/L (ref 96–106)
CHOLEST SERPL-MCNC: 127 MG/DL (ref 100–199)
CO2 SERPL-SCNC: 23 MMOL/L (ref 20–29)
CREAT SERPL-MCNC: 1.01 MG/DL (ref 0.76–1.27)
EGFRCR SERPLBLD CKD-EPI 2021: 89 ML/MIN/1.73
EOSINOPHIL # BLD AUTO: 0.1 X10E3/UL (ref 0–0.4)
EOSINOPHIL NFR BLD AUTO: 1 %
ERYTHROCYTE [DISTWIDTH] IN BLOOD BY AUTOMATED COUNT: 15.4 % (ref 11.6–15.4)
GLOBULIN SER CALC-MCNC: 2 G/DL (ref 1.5–4.5)
GLUCOSE SERPL-MCNC: 215 MG/DL (ref 70–99)
HCT VFR BLD AUTO: 50.1 % (ref 37.5–51)
HDLC SERPL-MCNC: 64 MG/DL
HGB BLD-MCNC: 16.4 G/DL (ref 13–17.7)
IMM GRANULOCYTES # BLD AUTO: 0.1 X10E3/UL (ref 0–0.1)
IMM GRANULOCYTES NFR BLD AUTO: 1 %
IMP & REVIEW OF LAB RESULTS: NORMAL
LDLC SERPL CALC-MCNC: 42 MG/DL (ref 0–99)
LYMPHOCYTES # BLD AUTO: 3 X10E3/UL (ref 0.7–3.1)
LYMPHOCYTES NFR BLD AUTO: 20 %
MCH RBC QN AUTO: 25 PG (ref 26.6–33)
MCHC RBC AUTO-ENTMCNC: 32.7 G/DL (ref 31.5–35.7)
MCV RBC AUTO: 76 FL (ref 79–97)
MONOCYTES # BLD AUTO: 0.9 X10E3/UL (ref 0.1–0.9)
MONOCYTES NFR BLD AUTO: 6 %
NEUTROPHILS # BLD AUTO: 10.9 X10E3/UL (ref 1.4–7)
NEUTROPHILS NFR BLD AUTO: 72 %
PLATELET # BLD AUTO: 220 X10E3/UL (ref 150–450)
POTASSIUM SERPL-SCNC: 4.6 MMOL/L (ref 3.5–5.2)
PROT SERPL-MCNC: 6.6 G/DL (ref 6–8.5)
PSA SERPL-MCNC: 0.6 NG/ML (ref 0–4)
RBC # BLD AUTO: 6.57 X10E6/UL (ref 4.14–5.8)
REFLEX CRITERIA: NORMAL
SODIUM SERPL-SCNC: 139 MMOL/L (ref 134–144)
TRIGL SERPL-MCNC: 120 MG/DL (ref 0–149)
VLDLC SERPL CALC-MCNC: 21 MG/DL (ref 5–40)
WBC # BLD AUTO: 15 X10E3/UL (ref 3.4–10.8)

## 2023-10-18 RX ORDER — ROSUVASTATIN CALCIUM 40 MG/1
TABLET, COATED ORAL
Qty: 90 TABLET | Refills: 3 | Status: SHIPPED | OUTPATIENT
Start: 2023-10-18

## 2023-11-07 ENCOUNTER — HOSPITAL ENCOUNTER (OUTPATIENT)
Age: 53
Discharge: HOME | End: 2023-11-10
Payer: COMMERCIAL

## 2023-11-07 DIAGNOSIS — M51.36 DDD (DEGENERATIVE DISC DISEASE), LUMBAR: ICD-10-CM

## 2023-11-07 DIAGNOSIS — M51.36 DISCOGENIC LOW BACK PAIN: ICD-10-CM

## 2023-11-07 DIAGNOSIS — M48.062 SPINAL STENOSIS OF LUMBAR REGION WITH NEUROGENIC CLAUDICATION: ICD-10-CM

## 2023-11-07 DIAGNOSIS — M54.16 RADICULOPATHY, LUMBAR REGION: ICD-10-CM

## 2023-11-07 DIAGNOSIS — M51.16 INTERVERTEBRAL DISC DISORDER WITH RADICULOPATHY OF LUMBAR REGION: ICD-10-CM

## 2023-11-07 DIAGNOSIS — M54.31 RIGHT SIDED SCIATICA: ICD-10-CM

## 2023-11-07 PROCEDURE — 72148 MRI LUMBAR SPINE W/O DYE: CPT

## 2023-11-09 RX ORDER — LIRAGLUTIDE 6 MG/ML
INJECTION SUBCUTANEOUS
Qty: 45 ML | Refills: 3 | Status: SHIPPED | OUTPATIENT
Start: 2023-11-09

## 2023-11-09 RX ORDER — INSULIN DETEMIR 100 [IU]/ML
INJECTION, SOLUTION SUBCUTANEOUS
Qty: 45 ML | Refills: 3 | Status: SHIPPED | OUTPATIENT
Start: 2023-11-09

## 2023-11-09 RX ORDER — ALBUTEROL SULFATE 90 UG/1
AEROSOL, METERED RESPIRATORY (INHALATION)
Qty: 8.5 G | Refills: 6 | Status: SHIPPED | OUTPATIENT
Start: 2023-11-09

## 2024-01-08 RX ORDER — ASPIRIN 81 MG/1
81 TABLET, COATED ORAL DAILY
Qty: 90 TABLET | Refills: 3 | OUTPATIENT
Start: 2024-01-08

## 2024-01-08 RX ORDER — ASPIRIN 81 MG/1
81 TABLET ORAL DAILY
Qty: 90 TABLET | Refills: 2 | Status: SHIPPED | OUTPATIENT
Start: 2024-01-08

## 2024-01-18 ENCOUNTER — OFFICE VISIT (OUTPATIENT)
Facility: CLINIC | Age: 54
End: 2024-01-18
Payer: COMMERCIAL

## 2024-01-18 VITALS
RESPIRATION RATE: 18 BRPM | WEIGHT: 315 LBS | DIASTOLIC BLOOD PRESSURE: 90 MMHG | BODY MASS INDEX: 39.17 KG/M2 | HEART RATE: 84 BPM | TEMPERATURE: 98.1 F | SYSTOLIC BLOOD PRESSURE: 154 MMHG | HEIGHT: 75 IN | OXYGEN SATURATION: 94 %

## 2024-01-18 DIAGNOSIS — I10 PRIMARY HYPERTENSION: ICD-10-CM

## 2024-01-18 DIAGNOSIS — M54.16 LUMBAR RADICULOPATHY: ICD-10-CM

## 2024-01-18 DIAGNOSIS — Z12.11 COLON CANCER SCREENING: ICD-10-CM

## 2024-01-18 DIAGNOSIS — G47.33 OBSTRUCTIVE SLEEP APNEA SYNDROME: ICD-10-CM

## 2024-01-18 DIAGNOSIS — E11.65 TYPE 2 DIABETES MELLITUS WITH HYPERGLYCEMIA, WITH LONG-TERM CURRENT USE OF INSULIN (HCC): Primary | ICD-10-CM

## 2024-01-18 DIAGNOSIS — Z79.4 TYPE 2 DIABETES MELLITUS WITH HYPERGLYCEMIA, WITH LONG-TERM CURRENT USE OF INSULIN (HCC): Primary | ICD-10-CM

## 2024-01-18 DIAGNOSIS — E66.01 OBESITY, CLASS III, BMI 40-49.9 (MORBID OBESITY) (HCC): ICD-10-CM

## 2024-01-18 LAB
HBA1C MFR BLD: 9 %
MICROALBUMIN URINE, POC: 10 MG/L

## 2024-01-18 PROCEDURE — 3080F DIAST BP >= 90 MM HG: CPT | Performed by: NURSE PRACTITIONER

## 2024-01-18 PROCEDURE — 82044 UR ALBUMIN SEMIQUANTITATIVE: CPT | Performed by: NURSE PRACTITIONER

## 2024-01-18 PROCEDURE — 83036 HEMOGLOBIN GLYCOSYLATED A1C: CPT | Performed by: NURSE PRACTITIONER

## 2024-01-18 PROCEDURE — 3077F SYST BP >= 140 MM HG: CPT | Performed by: NURSE PRACTITIONER

## 2024-01-18 PROCEDURE — 99214 OFFICE O/P EST MOD 30 MIN: CPT | Performed by: NURSE PRACTITIONER

## 2024-01-18 RX ORDER — BLOOD SUGAR DIAGNOSTIC
1 STRIP MISCELLANEOUS DAILY
COMMUNITY
Start: 2023-10-25

## 2024-01-18 RX ORDER — CELECOXIB 200 MG/1
200 CAPSULE ORAL DAILY
COMMUNITY
Start: 2023-12-21

## 2024-01-18 SDOH — ECONOMIC STABILITY: INCOME INSECURITY: HOW HARD IS IT FOR YOU TO PAY FOR THE VERY BASICS LIKE FOOD, HOUSING, MEDICAL CARE, AND HEATING?: NOT HARD AT ALL

## 2024-01-18 SDOH — ECONOMIC STABILITY: FOOD INSECURITY: WITHIN THE PAST 12 MONTHS, THE FOOD YOU BOUGHT JUST DIDN'T LAST AND YOU DIDN'T HAVE MONEY TO GET MORE.: NEVER TRUE

## 2024-01-18 SDOH — ECONOMIC STABILITY: FOOD INSECURITY: WITHIN THE PAST 12 MONTHS, YOU WORRIED THAT YOUR FOOD WOULD RUN OUT BEFORE YOU GOT MONEY TO BUY MORE.: NEVER TRUE

## 2024-01-18 ASSESSMENT — PATIENT HEALTH QUESTIONNAIRE - PHQ9
SUM OF ALL RESPONSES TO PHQ QUESTIONS 1-9: 0
1. LITTLE INTEREST OR PLEASURE IN DOING THINGS: 0
SUM OF ALL RESPONSES TO PHQ QUESTIONS 1-9: 0
2. FEELING DOWN, DEPRESSED OR HOPELESS: 0
SUM OF ALL RESPONSES TO PHQ QUESTIONS 1-9: 0
SUM OF ALL RESPONSES TO PHQ9 QUESTIONS 1 & 2: 0
SUM OF ALL RESPONSES TO PHQ QUESTIONS 1-9: 0

## 2024-01-18 NOTE — PROGRESS NOTES
Chief Complaint   Patient presents with    3 Month Follow-Up    Diabetes        BP (!) 154/90 (Site: Left Upper Arm)   Pulse 84   Temp 98.1 °F (36.7 °C)   Resp 18   Ht 1.905 m (6' 3\")   Wt (!) 155.1 kg (341 lb 14.4 oz)   SpO2 94%   BMI 42.73 kg/m²      1. Have you been to the ER, urgent care clinic since your last visit?  Hospitalized since your last visit? no    2. Have you seen or consulted any other health care providers outside of the [facility] since your last visit?  Include any pap smears or colon screening.  no    Health Maintenance Due   Topic Date Due    Hepatitis B vaccine (1 of 3 - 3-dose series) Never done    Hepatitis C screen  Never done    DTaP/Tdap/Td vaccine (1 - Tdap) Never done    Diabetic retinal exam  01/24/2020    Diabetic foot exam  11/18/2022    COVID-19 Vaccine (4 - 2023-24 season) 09/01/2023    Colorectal Cancer Screen  11/04/2023    Diabetic Alb to Cr ratio (uACR) test  12/21/2023             No data to display                 \"Have you been to the ER, urgent care clinic since your last visit?  Hospitalized since your last visit?\"    no    “Have you seen or consulted any other health care providers outside of [facility] since your last visit?”     no    “Have you had a colorectal cancer screening such as a colonoscopy/FIT/Cologuard?     no

## 2024-01-18 NOTE — PROGRESS NOTES
Subjective: (As above and below)     Chief Complaint   Patient presents with    3 Month Follow-Up    Diabetes     Leandra Dumont is a 53 y.o. year old male who presents for     Diabetic Review of Systems - medication compliance: compliant all of the time, diabetic diet compliance: compliant most of the time, home glucose monitoring: is performed sporadically.   Eye exam due  He has 1 mo w/o meds recently    Hypertension ROS:  taking medications as instructed, no medication side effects noted, no TIAs, no chest pain on exertion, no dyspnea on exertion, no swelling of ankles      Lumbar radiculopathy: in PT now  S/s not better        Wt Readings from Last 3 Encounters:   01/18/24 (!) 155.1 kg (341 lb 14.4 oz)   11/07/23 (!) 146.1 kg (322 lb)   10/12/23 (!) 146.1 kg (322 lb 3.2 oz)       BP Readings from Last 3 Encounters:   01/18/24 (!) 154/90   10/12/23 115/81   09/21/23 (!) 154/94       Colonoscopy: due  No Gi s/s  Did cologuard before      Reviewed PmHx, RxHx, FmHx, SocHx, AllgHx and updated in chart.  Family History   Problem Relation Age of Onset    Diabetes Paternal Uncle     Hypertension Maternal Uncle     Diabetes Maternal Grandmother     Diabetes Paternal Grandmother     Hypertension Maternal Uncle     Hypertension Maternal Uncle     Hypertension Brother     Diabetes Brother     Hypertension Father     Hypertension Mother     Diabetes Mother         cancer, liver? hep c       Past Medical History:   Diagnosis Date    Asthma     Diabetes (HCC)     HTN (hypertension) 3/30/2010    Hypertension     Other and unspecified hyperlipidemia 3/30/2010    Sleep apnea     Type II or unspecified type diabetes mellitus without mention of complication, uncontrolled 3/30/2010      Social History     Socioeconomic History    Marital status: Single     Spouse name: None    Number of children: None    Years of education: None    Highest education level: None   Tobacco Use    Smoking status: Some Days     Current packs/day: 0.50

## 2024-01-30 LAB — NONINV COLON CA DNA+OCC BLD SCRN STL QL: NEGATIVE

## 2024-02-13 RX ORDER — ENALAPRIL MALEATE AND HYDROCHLOROTHIAZIDE 10; 25 MG/1; MG/1
1 TABLET ORAL DAILY
Qty: 90 TABLET | Refills: 3 | Status: SHIPPED | OUTPATIENT
Start: 2024-02-13

## 2024-02-26 RX ORDER — PEN NEEDLE, DIABETIC 31 GX5/16"
NEEDLE, DISPOSABLE MISCELLANEOUS
Qty: 100 EACH | Refills: 1 | Status: SHIPPED | OUTPATIENT
Start: 2024-02-26

## 2024-02-26 RX ORDER — ROSUVASTATIN CALCIUM 40 MG/1
40 TABLET, COATED ORAL NIGHTLY
Qty: 90 TABLET | Refills: 3 | Status: SHIPPED | OUTPATIENT
Start: 2024-02-26

## 2024-02-26 RX ORDER — SILDENAFIL 100 MG/1
100 TABLET, FILM COATED ORAL DAILY PRN
Qty: 20 TABLET | Refills: 5 | Status: SHIPPED | OUTPATIENT
Start: 2024-02-26

## 2024-02-26 RX ORDER — ALBUTEROL SULFATE 90 UG/1
AEROSOL, METERED RESPIRATORY (INHALATION)
Qty: 8.5 G | Refills: 6 | Status: SHIPPED | OUTPATIENT
Start: 2024-02-26

## 2024-02-27 RX ORDER — LISINOPRIL AND HYDROCHLOROTHIAZIDE 12.5; 1 MG/1; MG/1
2 TABLET ORAL DAILY
Qty: 270 TABLET | Refills: 0 | Status: SHIPPED | OUTPATIENT
Start: 2024-02-27 | End: 2024-02-29 | Stop reason: SDUPTHER

## 2024-02-28 RX ORDER — BLOOD SUGAR DIAGNOSTIC
STRIP MISCELLANEOUS
Qty: 100 STRIP | Refills: 0 | Status: SHIPPED | OUTPATIENT
Start: 2024-02-28

## 2024-02-29 ENCOUNTER — TELEPHONE (OUTPATIENT)
Facility: CLINIC | Age: 54
End: 2024-02-29

## 2024-02-29 RX ORDER — LISINOPRIL AND HYDROCHLOROTHIAZIDE 12.5; 1 MG/1; MG/1
2 TABLET ORAL DAILY
Qty: 270 TABLET | Refills: 0 | Status: SHIPPED | OUTPATIENT
Start: 2024-02-29

## 2024-02-29 NOTE — TELEPHONE ENCOUNTER
[facility] states rx is out of stock and pt is completely out of meds. Asks if you can send them this alternative script    lisinopril-hydroCHLOROthiazide (PRINZIDE;ZESTORETIC) 10-12.5 MG per tablet

## 2024-03-06 ENCOUNTER — TELEPHONE (OUTPATIENT)
Facility: CLINIC | Age: 54
End: 2024-03-06

## 2024-03-06 RX ORDER — INSULIN GLARGINE 100 [IU]/ML
50 INJECTION, SOLUTION SUBCUTANEOUS NIGHTLY
Qty: 5 ADJUSTABLE DOSE PRE-FILLED PEN SYRINGE | Refills: 2 | Status: SHIPPED | OUTPATIENT
Start: 2024-03-06

## 2024-03-06 NOTE — TELEPHONE ENCOUNTER
Patient insurance is not covering the LEVEMIR FLEXPEN 100 UNIT/ML injection pen , wants to know if Basaglar Quick Pen can be called in

## 2024-05-16 NOTE — PROGRESS NOTES
ACTIVITY/SAFETY  Because of the aftereffect of the sedation you received, we advise you to refrain from the following activities for 24 hours.  1. Do not drive or operate machinery.  2. Do not operate appliances if alone. (stove, iron, lawnmower)  3. Do not sign legal papers or make important decisions.    Have standby assistance on stairways.  It is advised that you have someone stay with you for at least 8 hours after procedure    Comfort:  If you develop a sore throat gargle with warm salt water (1/2 tsp.of salt in 8oz of warm water) or use throat lozenges.     Some of the sedatives you received today may make you nauseated.  Begin with clear liquids and then progress to solid foods as tolerated.    Avoid eating for 1 hour after procedure due to numbing of throat before procedure.    CALL THE DOCTOR FOR:  SEVERE THROAT PAIN / DIFFICULTY SWALLOWING                                                  SEVERE ABDOMINAL OR CHEST PAIN                                                  VOMITING BLOOD.                  No additional comment

## 2024-07-01 RX ORDER — EMPAGLIFLOZIN 10 MG/1
TABLET, FILM COATED ORAL
Qty: 90 TABLET | Refills: 3 | Status: SHIPPED | OUTPATIENT
Start: 2024-07-01

## 2024-07-18 ENCOUNTER — OFFICE VISIT (OUTPATIENT)
Facility: CLINIC | Age: 54
End: 2024-07-18
Payer: COMMERCIAL

## 2024-07-18 VITALS
WEIGHT: 315 LBS | DIASTOLIC BLOOD PRESSURE: 85 MMHG | RESPIRATION RATE: 18 BRPM | HEART RATE: 80 BPM | SYSTOLIC BLOOD PRESSURE: 151 MMHG | TEMPERATURE: 96.7 F | HEIGHT: 75 IN | BODY MASS INDEX: 39.17 KG/M2 | OXYGEN SATURATION: 95 %

## 2024-07-18 DIAGNOSIS — Z79.4 TYPE 2 DIABETES MELLITUS WITHOUT COMPLICATION, WITH LONG-TERM CURRENT USE OF INSULIN (HCC): Primary | ICD-10-CM

## 2024-07-18 DIAGNOSIS — M54.16 LUMBAR RADICULOPATHY: ICD-10-CM

## 2024-07-18 DIAGNOSIS — I10 PRIMARY HYPERTENSION: ICD-10-CM

## 2024-07-18 DIAGNOSIS — E11.9 TYPE 2 DIABETES MELLITUS WITHOUT COMPLICATION, WITH LONG-TERM CURRENT USE OF INSULIN (HCC): Primary | ICD-10-CM

## 2024-07-18 DIAGNOSIS — G47.33 OBSTRUCTIVE SLEEP APNEA SYNDROME: ICD-10-CM

## 2024-07-18 LAB
GLUCOSE, POC: 135 MG/DL
HBA1C MFR BLD: 8.9 %

## 2024-07-18 PROCEDURE — 99214 OFFICE O/P EST MOD 30 MIN: CPT | Performed by: NURSE PRACTITIONER

## 2024-07-18 PROCEDURE — 3077F SYST BP >= 140 MM HG: CPT | Performed by: NURSE PRACTITIONER

## 2024-07-18 PROCEDURE — 3079F DIAST BP 80-89 MM HG: CPT | Performed by: NURSE PRACTITIONER

## 2024-07-18 PROCEDURE — 83036 HEMOGLOBIN GLYCOSYLATED A1C: CPT | Performed by: NURSE PRACTITIONER

## 2024-07-18 PROCEDURE — 82962 GLUCOSE BLOOD TEST: CPT | Performed by: NURSE PRACTITIONER

## 2024-07-18 RX ORDER — PREGABALIN 300 MG/1
300 CAPSULE ORAL 2 TIMES DAILY
COMMUNITY
Start: 2024-06-09 | End: 2024-07-18 | Stop reason: SDUPTHER

## 2024-07-18 RX ORDER — DULAGLUTIDE 0.75 MG/.5ML
0.75 INJECTION, SOLUTION SUBCUTANEOUS WEEKLY
Qty: 5 ADJUSTABLE DOSE PRE-FILLED PEN SYRINGE | Refills: 0 | Status: SHIPPED | OUTPATIENT
Start: 2024-07-18

## 2024-07-18 RX ORDER — PREGABALIN 300 MG/1
300 CAPSULE ORAL 2 TIMES DAILY
Qty: 180 CAPSULE | Refills: 0 | Status: SHIPPED | OUTPATIENT
Start: 2024-07-18 | End: 2025-07-18

## 2024-07-18 ASSESSMENT — PATIENT HEALTH QUESTIONNAIRE - PHQ9
SUM OF ALL RESPONSES TO PHQ QUESTIONS 1-9: 0
SUM OF ALL RESPONSES TO PHQ9 QUESTIONS 1 & 2: 0
SUM OF ALL RESPONSES TO PHQ QUESTIONS 1-9: 0
1. LITTLE INTEREST OR PLEASURE IN DOING THINGS: NOT AT ALL
2. FEELING DOWN, DEPRESSED OR HOPELESS: NOT AT ALL

## 2024-07-18 NOTE — PROGRESS NOTES
\"Have you been to the ER, urgent care clinic since your last visit?  Hospitalized since your last visit?\"    NO    “Have you seen or consulted any other health care providers outside of [facility] since your last visit?”    NO      Click Here for Release of Records Request    Chief Complaint   Patient presents with    3 Month Follow-Up    Diabetes     BP (!) 151/85 (Site: Right Upper Arm, Position: Sitting, Cuff Size: Large Adult)   Pulse 80   Temp (!) 96.7 °F (35.9 °C) (Oral)   Resp 18   Ht 1.905 m (6' 3\")   Wt (!) 153.5 kg (338 lb 6.4 oz)   SpO2 95%   BMI 42.30 kg/m²     
WITH MEALS FOR DIABETES    Lancets MISC Use as directed. Dx: e11.65 check sugars TID    ibuprofen (ADVIL;MOTRIN) 800 MG tablet Take by mouth every 8 hours as needed     No current facility-administered medications for this visit.       Review of Systems:   Constitutional:    Negative for fever and chills, negative diaphoresis.   HEENT:              Negative for neck pain and stiffness.  Eyes:                  Negative for visual disturbance, itching, redness or discharge.   Respiratory:        Negative for cough and shortness of breath.   Cardiovascular:  Negative for chest pain and palpitations.   Gastrointestinal: Negative for nausea, vomiting, abdominal pain, diarrhea or constipation.  Genitourinary:     Negative for dysuria and frequency.   Musculoskeletal: Negative for falls, tenderness and swelling.  Skin:                    Negative for rash, masses or lesions.   Neurological:       Negative for dizzyness, seizure, loss of consciousness, weakness and numbness.     Objective:     Vitals:    07/18/24 0913 07/18/24 0921   BP: (!) 156/88 (!) 151/85   Site: Left Upper Arm Right Upper Arm   Position: Sitting Sitting   Cuff Size: Large Adult Large Adult   Pulse: 80 80   Resp: 18    Temp: (!) 96.7 °F (35.9 °C)    TempSrc: Oral    SpO2: 100% 95%   Weight: (!) 153.5 kg (338 lb 6.4 oz)    Height: 1.905 m (6' 3\")          Gen: Oriented to person, place and time and well-developed, well-nourished and in no distress.   HEENT:    Head: normocephalic and atraumatic.    Eyes:  EOM are normal. Pupils equal and round.    Neck:  Normal range of motion.  Neck supple.    Cardiovascular: normal rate, regular rhythm and normal heart sounds.   Pulmonary/Chest:  Effort normal and breath sounds normal.  No respiratory distress.  No wheezes, no rales.   Abdominal: soft, normal  bowel sounds.   Musculoskeletal:  No edema, no tenderness.  No calf tenderness or edema.   Neurological:  Alert, oriented to person, place and time.    Skin:

## 2024-08-02 ENCOUNTER — OFFICE VISIT (OUTPATIENT)
Age: 54
End: 2024-08-02
Payer: COMMERCIAL

## 2024-08-02 VITALS
BODY MASS INDEX: 39.17 KG/M2 | HEIGHT: 75 IN | DIASTOLIC BLOOD PRESSURE: 86 MMHG | WEIGHT: 315 LBS | OXYGEN SATURATION: 96 % | HEART RATE: 92 BPM | SYSTOLIC BLOOD PRESSURE: 138 MMHG

## 2024-08-02 DIAGNOSIS — M54.16 LUMBAR RADICULOPATHY: Primary | ICD-10-CM

## 2024-08-02 DIAGNOSIS — R20.0 RIGHT LEG NUMBNESS: ICD-10-CM

## 2024-08-02 PROCEDURE — 99204 OFFICE O/P NEW MOD 45 MIN: CPT | Performed by: PSYCHIATRY & NEUROLOGY

## 2024-08-02 PROCEDURE — 3079F DIAST BP 80-89 MM HG: CPT | Performed by: PSYCHIATRY & NEUROLOGY

## 2024-08-02 PROCEDURE — 3075F SYST BP GE 130 - 139MM HG: CPT | Performed by: PSYCHIATRY & NEUROLOGY

## 2024-08-02 RX ORDER — INSULIN GLARGINE 100 [IU]/ML
INJECTION, SOLUTION SUBCUTANEOUS
Qty: 15 ML | Refills: 5 | Status: SHIPPED | OUTPATIENT
Start: 2024-08-02

## 2024-08-02 NOTE — PROGRESS NOTES
Chief Complaint   Patient presents with    Neurologic Problem     Lumbar radiculopathy       HISTORY OF PRESENT ILLNESS  Leandra Dumont is a 54 y.o. male who came in for neurological consultation requested by his PCP.  He has had pain in his right leg for the past couple years and back in 2021 and his leg was giving out/feeling weak.  He did some physical therapy which helped with the strength but exacerbated his back pain and this occurs every time he went for therapy.  Over the past year or so he has been noticing numbness that was initially on the anterior and lateral side of his right thigh but now has spread over to the inner side as well.  Does not have much back pain anymore.  He has been seen by Ortho/spine, had MRI of the lumbar spine which showed advanced degenerative changes at multiple levels with moderate to severe spinal canal stenosis and moderate to severe foraminal narrowing between L3 and L5.  Surgery was not recommended due to high BMI.  He does report losing about 50 to 60 pounds over the past 1 year.       Past Medical History:   Diagnosis Date    Asthma     Diabetes (HCC)     HTN (hypertension) 3/30/2010    Hypertension     Other and unspecified hyperlipidemia 3/30/2010    Sleep apnea     Type II or unspecified type diabetes mellitus without mention of complication, uncontrolled 3/30/2010     Current Outpatient Medications   Medication Sig    pregabalin (LYRICA) 300 MG capsule Take 1 capsule by mouth 2 times daily. Max Daily Amount: 600 mg    dulaglutide (TRULICITY) 0.75 MG/0.5ML SOPN SC injection Inject 0.5 mLs into the skin once a week    JARDIANCE 10 MG tablet TAKE 1 TABLET DAILY    lisinopril-hydroCHLOROthiazide (PRINZIDE;ZESTORETIC) 10-12.5 MG per tablet Take 2 tablets by mouth daily    ONETOUCH VERIO strip CHECK BLOOD SUGAR ONCE DAILY    sildenafil (VIAGRA) 100 MG tablet TAKE 1 TABLET BY MOUTH DAILY AS NEEDED FOR ERECTILE DYSFUNCTION    B-D ULTRAFINE III SHORT PEN 31G X 8 MM MISC USE 1

## 2024-08-02 NOTE — PROGRESS NOTES
Chief Complaint   Patient presents with    Neurologic Problem     Lumbar radiculopathy     Vitals:    08/02/24 0910   BP: 138/86   Pulse: 92   SpO2: 96%

## 2024-08-06 ENCOUNTER — TELEPHONE (OUTPATIENT)
Age: 54
End: 2024-08-06

## 2024-08-22 ENCOUNTER — PATIENT MESSAGE (OUTPATIENT)
Facility: CLINIC | Age: 54
End: 2024-08-22

## 2024-08-22 DIAGNOSIS — Z79.4 TYPE 2 DIABETES MELLITUS WITHOUT COMPLICATION, WITH LONG-TERM CURRENT USE OF INSULIN (HCC): ICD-10-CM

## 2024-08-22 DIAGNOSIS — E11.9 TYPE 2 DIABETES MELLITUS WITHOUT COMPLICATION, WITH LONG-TERM CURRENT USE OF INSULIN (HCC): ICD-10-CM

## 2024-08-23 DIAGNOSIS — E11.9 TYPE 2 DIABETES MELLITUS WITHOUT COMPLICATION, WITH LONG-TERM CURRENT USE OF INSULIN (HCC): ICD-10-CM

## 2024-08-23 DIAGNOSIS — Z79.4 TYPE 2 DIABETES MELLITUS WITHOUT COMPLICATION, WITH LONG-TERM CURRENT USE OF INSULIN (HCC): ICD-10-CM

## 2024-08-23 RX ORDER — DULAGLUTIDE 0.75 MG/.5ML
0.75 INJECTION, SOLUTION SUBCUTANEOUS WEEKLY
Qty: 5 ADJUSTABLE DOSE PRE-FILLED PEN SYRINGE | Refills: 0 | Status: SHIPPED | OUTPATIENT
Start: 2024-08-23

## 2024-08-23 RX ORDER — DULAGLUTIDE 0.75 MG/.5ML
0.75 INJECTION, SOLUTION SUBCUTANEOUS WEEKLY
Qty: 2 ML | OUTPATIENT
Start: 2024-08-23

## 2024-10-12 DIAGNOSIS — M54.16 LUMBAR RADICULOPATHY: ICD-10-CM

## 2024-10-12 DIAGNOSIS — E11.9 TYPE 2 DIABETES MELLITUS WITHOUT COMPLICATION, WITH LONG-TERM CURRENT USE OF INSULIN (HCC): ICD-10-CM

## 2024-10-12 DIAGNOSIS — Z79.4 TYPE 2 DIABETES MELLITUS WITHOUT COMPLICATION, WITH LONG-TERM CURRENT USE OF INSULIN (HCC): ICD-10-CM

## 2024-10-14 RX ORDER — DULAGLUTIDE 0.75 MG/.5ML
INJECTION, SOLUTION SUBCUTANEOUS
Qty: 2 ML | Refills: 1 | Status: SHIPPED | OUTPATIENT
Start: 2024-10-14

## 2024-10-14 RX ORDER — ROSUVASTATIN CALCIUM 40 MG/1
40 TABLET, COATED ORAL NIGHTLY
Qty: 90 TABLET | Refills: 3 | Status: SHIPPED | OUTPATIENT
Start: 2024-10-14

## 2024-10-14 RX ORDER — PREGABALIN 300 MG/1
CAPSULE ORAL
Qty: 180 CAPSULE | Refills: 1 | Status: SHIPPED | OUTPATIENT
Start: 2024-10-14 | End: 2025-10-14

## 2024-10-22 ENCOUNTER — OFFICE VISIT (OUTPATIENT)
Facility: CLINIC | Age: 54
End: 2024-10-22
Payer: COMMERCIAL

## 2024-10-22 VITALS
TEMPERATURE: 98 F | BODY MASS INDEX: 39.17 KG/M2 | SYSTOLIC BLOOD PRESSURE: 130 MMHG | HEART RATE: 83 BPM | DIASTOLIC BLOOD PRESSURE: 76 MMHG | HEIGHT: 75 IN | WEIGHT: 315 LBS | RESPIRATION RATE: 18 BRPM

## 2024-10-22 DIAGNOSIS — G47.33 OBSTRUCTIVE SLEEP APNEA SYNDROME: ICD-10-CM

## 2024-10-22 DIAGNOSIS — E78.2 MIXED HYPERLIPIDEMIA: ICD-10-CM

## 2024-10-22 DIAGNOSIS — Z79.4 TYPE 2 DIABETES MELLITUS WITHOUT COMPLICATION, WITH LONG-TERM CURRENT USE OF INSULIN (HCC): ICD-10-CM

## 2024-10-22 DIAGNOSIS — M48.061 FORAMINAL STENOSIS OF LUMBAR REGION: ICD-10-CM

## 2024-10-22 DIAGNOSIS — I10 PRIMARY HYPERTENSION: ICD-10-CM

## 2024-10-22 DIAGNOSIS — E11.9 TYPE 2 DIABETES MELLITUS WITHOUT COMPLICATION, WITH LONG-TERM CURRENT USE OF INSULIN (HCC): ICD-10-CM

## 2024-10-22 DIAGNOSIS — I10 PRIMARY HYPERTENSION: Primary | ICD-10-CM

## 2024-10-22 DIAGNOSIS — M54.16 LUMBAR RADICULOPATHY: ICD-10-CM

## 2024-10-22 LAB
GLUCOSE, POC: 166 MG/DL
HBA1C MFR BLD: 8.2 %

## 2024-10-22 PROCEDURE — 3075F SYST BP GE 130 - 139MM HG: CPT | Performed by: NURSE PRACTITIONER

## 2024-10-22 PROCEDURE — 82962 GLUCOSE BLOOD TEST: CPT | Performed by: NURSE PRACTITIONER

## 2024-10-22 PROCEDURE — 3078F DIAST BP <80 MM HG: CPT | Performed by: NURSE PRACTITIONER

## 2024-10-22 PROCEDURE — 90471 IMMUNIZATION ADMIN: CPT | Performed by: NURSE PRACTITIONER

## 2024-10-22 PROCEDURE — 99214 OFFICE O/P EST MOD 30 MIN: CPT | Performed by: NURSE PRACTITIONER

## 2024-10-22 PROCEDURE — 90661 CCIIV3 VAC ABX FR 0.5 ML IM: CPT | Performed by: NURSE PRACTITIONER

## 2024-10-22 PROCEDURE — 83036 HEMOGLOBIN GLYCOSYLATED A1C: CPT | Performed by: NURSE PRACTITIONER

## 2024-10-22 RX ORDER — PREGABALIN 300 MG/1
300 CAPSULE ORAL 2 TIMES DAILY
Qty: 180 CAPSULE | Refills: 1 | Status: SHIPPED | OUTPATIENT
Start: 2024-10-22 | End: 2025-10-22

## 2024-10-22 NOTE — PROGRESS NOTES
Subjective: (As above and below)     Chief Complaint   Patient presents with    3 Month Follow-Up    Diabetes    Leg Pain     Right leg pain     Leandra Dumont is a 54 y.o. year old male who presents for     Hypertension ROS:  taking medications as instructed, no medication side effects noted, no TIAs, no chest pain on exertion, no dyspnea on exertion, no swelling of ankles      BP Readings from Last 3 Encounters:   10/22/24 130/76   08/02/24 138/86   07/18/24 (!) 151/85       Diabetic Review of Systems - medication compliance: compliant all of the time, diabetic diet compliance: compliant most of the time, home glucose monitoring: is performed sporadically.   Has been struggling to get trulicity  Eye exam utd    Hyperlipidemia; tolerating statin    R leg pain: hx of lumbar radiculopathy; lyrica \"milds it\", feel like it is going to L leg  Is seeing ortho  Was hoping for surgery but was to lose weight which he states he did at one point but was still deferred, now weight is back  Seeking alternate ortho team  Also sees neuro for neuropathy  He is struggling at work      Wt Readings from Last 3 Encounters:   10/22/24 (!) 152.1 kg (335 lb 4.8 oz)   08/02/24 (!) 156.9 kg (346 lb)   07/18/24 (!) 153.5 kg (338 lb 6.4 oz)       Flu shot utd    Reviewed PmHx, RxHx, FmHx, SocHx, AllgHx and updated in chart.  Family History   Problem Relation Age of Onset    Diabetes Paternal Uncle     Hypertension Maternal Uncle     Diabetes Maternal Grandmother     Diabetes Paternal Grandmother     Hypertension Maternal Uncle     Hypertension Maternal Uncle     Hypertension Brother     Diabetes Brother     Hypertension Father     Hypertension Mother     Diabetes Mother         cancer, liver? hep c       Past Medical History:   Diagnosis Date    Asthma     Diabetes (HCC)     HTN (hypertension) 3/30/2010    Hypertension     Other and unspecified hyperlipidemia 3/30/2010    Sleep apnea     Type II or unspecified type diabetes mellitus without

## 2024-10-22 NOTE — PROGRESS NOTES
\"Have you been to the ER, urgent care clinic since your last visit?  Hospitalized since your last visit?\"    NO    “Have you seen or consulted any other health care providers outside our system since your last visit?”    NO    “Have you had a diabetic eye exam?”    YES Jan 2024 Sima pineda    Date of last diabetic eye exam: 1/24/2019          /76 (Site: Left Upper Arm, Position: Sitting, Cuff Size: Large Adult)   Pulse 83   Temp 98 °F (36.7 °C) (Temporal)   Resp 18   Ht 1.905 m (6' 3\")   Wt (!) 152.1 kg (335 lb 4.8 oz)   BMI 41.91 kg/m²

## 2024-12-03 RX ORDER — LISINOPRIL AND HYDROCHLOROTHIAZIDE 10; 12.5 MG/1; MG/1
2 TABLET ORAL DAILY
Qty: 180 TABLET | Refills: 0 | Status: SHIPPED | OUTPATIENT
Start: 2024-12-03

## 2024-12-03 NOTE — TELEPHONE ENCOUNTER
[facility] is requesting a new prescription. Previous prescription was sent to [facility].     Last appointment: 10/22/2024 COURTNEY Olvera   Next appointment: 01/23/2025 COURTNEY Olvera   Previous refill encounter(s):   02/29/2024 [id number]    For Pharmacy Admin Tracking Only    Program: Medication Refill  Intervention Detail: New Rx: 1, reason: Patient Preference  Time Spent (min): 5    Requested Prescriptions     Pending Prescriptions Disp Refills    lisinopril-hydroCHLOROthiazide (PRINZIDE;ZESTORETIC) 10-12.5 MG per tablet [Pharmacy Med Name: LISINOPRIL-HCTZ 10/12.5MG TABLETS] 180 tablet 0     Sig: TAKE 2 TABLETS BY MOUTH DAILY

## 2024-12-16 RX ORDER — ALBUTEROL SULFATE 90 UG/1
INHALANT RESPIRATORY (INHALATION)
Qty: 8.5 G | Refills: 6 | Status: SHIPPED | OUTPATIENT
Start: 2024-12-16

## 2025-01-20 SDOH — ECONOMIC STABILITY: FOOD INSECURITY: WITHIN THE PAST 12 MONTHS, THE FOOD YOU BOUGHT JUST DIDN'T LAST AND YOU DIDN'T HAVE MONEY TO GET MORE.: NEVER TRUE

## 2025-01-20 SDOH — ECONOMIC STABILITY: INCOME INSECURITY: IN THE LAST 12 MONTHS, WAS THERE A TIME WHEN YOU WERE NOT ABLE TO PAY THE MORTGAGE OR RENT ON TIME?: YES

## 2025-01-20 SDOH — ECONOMIC STABILITY: TRANSPORTATION INSECURITY
IN THE PAST 12 MONTHS, HAS THE LACK OF TRANSPORTATION KEPT YOU FROM MEDICAL APPOINTMENTS OR FROM GETTING MEDICATIONS?: NO

## 2025-01-20 SDOH — ECONOMIC STABILITY: FOOD INSECURITY: WITHIN THE PAST 12 MONTHS, YOU WORRIED THAT YOUR FOOD WOULD RUN OUT BEFORE YOU GOT MONEY TO BUY MORE.: NEVER TRUE

## 2025-01-20 SDOH — ECONOMIC STABILITY: TRANSPORTATION INSECURITY
IN THE PAST 12 MONTHS, HAS LACK OF TRANSPORTATION KEPT YOU FROM MEETINGS, WORK, OR FROM GETTING THINGS NEEDED FOR DAILY LIVING?: NO

## 2025-01-23 ENCOUNTER — OFFICE VISIT (OUTPATIENT)
Facility: CLINIC | Age: 55
End: 2025-01-23
Payer: COMMERCIAL

## 2025-01-23 VITALS
OXYGEN SATURATION: 98 % | SYSTOLIC BLOOD PRESSURE: 131 MMHG | DIASTOLIC BLOOD PRESSURE: 80 MMHG | RESPIRATION RATE: 18 BRPM | BODY MASS INDEX: 39.17 KG/M2 | TEMPERATURE: 98.2 F | HEIGHT: 75 IN | HEART RATE: 80 BPM | WEIGHT: 315 LBS

## 2025-01-23 DIAGNOSIS — J01.90 ACUTE NON-RECURRENT SINUSITIS, UNSPECIFIED LOCATION: ICD-10-CM

## 2025-01-23 DIAGNOSIS — M54.16 LUMBAR RADICULOPATHY: ICD-10-CM

## 2025-01-23 DIAGNOSIS — G47.33 OBSTRUCTIVE SLEEP APNEA SYNDROME: ICD-10-CM

## 2025-01-23 DIAGNOSIS — Z79.4 TYPE 2 DIABETES MELLITUS WITHOUT COMPLICATION, WITH LONG-TERM CURRENT USE OF INSULIN (HCC): ICD-10-CM

## 2025-01-23 DIAGNOSIS — F17.200 SMOKER: ICD-10-CM

## 2025-01-23 DIAGNOSIS — E11.9 TYPE 2 DIABETES MELLITUS WITHOUT COMPLICATION, WITH LONG-TERM CURRENT USE OF INSULIN (HCC): ICD-10-CM

## 2025-01-23 DIAGNOSIS — I10 PRIMARY HYPERTENSION: Primary | ICD-10-CM

## 2025-01-23 DIAGNOSIS — N52.9 ERECTILE DYSFUNCTION, UNSPECIFIED ERECTILE DYSFUNCTION TYPE: ICD-10-CM

## 2025-01-23 LAB
ALB/CREAT RATIO, POC: <30 MG/G
ALBUMIN SERPL-MCNC: 3.9 G/DL (ref 3.5–5)
ALBUMIN, URINE, POC: 30 MG/L
ALBUMIN/GLOB SERPL: 1.3 (ref 1.1–2.2)
ALP SERPL-CCNC: 83 U/L (ref 45–117)
ALT SERPL-CCNC: 27 U/L (ref 12–78)
ANION GAP SERPL CALC-SCNC: 5 MMOL/L (ref 2–12)
AST SERPL-CCNC: 25 U/L (ref 15–37)
BILIRUB SERPL-MCNC: 0.5 MG/DL (ref 0.2–1)
BUN SERPL-MCNC: 16 MG/DL (ref 6–20)
BUN/CREAT SERPL: 15 (ref 12–20)
CALCIUM SERPL-MCNC: 9.8 MG/DL (ref 8.5–10.1)
CHLORIDE SERPL-SCNC: 106 MMOL/L (ref 97–108)
CHOLEST SERPL-MCNC: 108 MG/DL
CO2 SERPL-SCNC: 29 MMOL/L (ref 21–32)
CREAT SERPL-MCNC: 1.09 MG/DL (ref 0.7–1.3)
CREATININE, URINE, POC: 200 MG/DL
GLOBULIN SER CALC-MCNC: 3 G/DL (ref 2–4)
GLUCOSE SERPL-MCNC: 131 MG/DL (ref 65–100)
GLUCOSE, POC: 155 MG/DL
HBA1C MFR BLD: 8 %
HDLC SERPL-MCNC: 51 MG/DL
HDLC SERPL: 2.1 (ref 0–5)
LDLC SERPL CALC-MCNC: 21.2 MG/DL (ref 0–100)
POTASSIUM SERPL-SCNC: 4.1 MMOL/L (ref 3.5–5.1)
PROT SERPL-MCNC: 6.9 G/DL (ref 6.4–8.2)
SODIUM SERPL-SCNC: 140 MMOL/L (ref 136–145)
TRIGL SERPL-MCNC: 179 MG/DL
VLDLC SERPL CALC-MCNC: 35.8 MG/DL

## 2025-01-23 PROCEDURE — 3079F DIAST BP 80-89 MM HG: CPT | Performed by: NURSE PRACTITIONER

## 2025-01-23 PROCEDURE — 83036 HEMOGLOBIN GLYCOSYLATED A1C: CPT | Performed by: NURSE PRACTITIONER

## 2025-01-23 PROCEDURE — 82044 UR ALBUMIN SEMIQUANTITATIVE: CPT | Performed by: NURSE PRACTITIONER

## 2025-01-23 PROCEDURE — 82962 GLUCOSE BLOOD TEST: CPT | Performed by: NURSE PRACTITIONER

## 2025-01-23 PROCEDURE — 99214 OFFICE O/P EST MOD 30 MIN: CPT | Performed by: NURSE PRACTITIONER

## 2025-01-23 PROCEDURE — 3075F SYST BP GE 130 - 139MM HG: CPT | Performed by: NURSE PRACTITIONER

## 2025-01-23 RX ORDER — AZITHROMYCIN 250 MG/1
TABLET, FILM COATED ORAL
Qty: 6 TABLET | Refills: 0 | Status: SHIPPED | OUTPATIENT
Start: 2025-01-23 | End: 2025-02-02

## 2025-01-23 SDOH — ECONOMIC STABILITY: FOOD INSECURITY: WITHIN THE PAST 12 MONTHS, THE FOOD YOU BOUGHT JUST DIDN'T LAST AND YOU DIDN'T HAVE MONEY TO GET MORE.: NEVER TRUE

## 2025-01-23 SDOH — ECONOMIC STABILITY: FOOD INSECURITY: WITHIN THE PAST 12 MONTHS, YOU WORRIED THAT YOUR FOOD WOULD RUN OUT BEFORE YOU GOT MONEY TO BUY MORE.: NEVER TRUE

## 2025-01-23 ASSESSMENT — PATIENT HEALTH QUESTIONNAIRE - PHQ9
SUM OF ALL RESPONSES TO PHQ QUESTIONS 1-9: 0
SUM OF ALL RESPONSES TO PHQ QUESTIONS 1-9: 0
SUM OF ALL RESPONSES TO PHQ9 QUESTIONS 1 & 2: 0
SUM OF ALL RESPONSES TO PHQ QUESTIONS 1-9: 0
1. LITTLE INTEREST OR PLEASURE IN DOING THINGS: NOT AT ALL
2. FEELING DOWN, DEPRESSED OR HOPELESS: NOT AT ALL
SUM OF ALL RESPONSES TO PHQ QUESTIONS 1-9: 0

## 2025-01-23 NOTE — PROGRESS NOTES
Subjective: (As above and below)     Chief Complaint   Patient presents with    3 Month Follow-Up    Diabetes     Leandra Dumont is a 54 y.o. year old male who presents for     Diabetic Review of Systems - medication compliance: compliant all of the time, diabetic diet compliance: compliant most of the time, home glucose monitoring: is performed regularly.   Has been out of trulicity     Hypertension ROS:  taking medications as instructed, no medication side effects noted, no TIAs, no chest pain on exertion, no dyspnea on exertion, no swelling of ankles    Hyperlipidemia; tolerating statin    BP Readings from Last 3 Encounters:   01/23/25 131/80   10/22/24 130/76   08/02/24 138/86       Wt Readings from Last 3 Encounters:   01/23/25 (!) 154.2 kg (339 lb 14.4 oz)   10/22/24 (!) 152.1 kg (335 lb 4.8 oz)   08/02/24 (!) 156.9 kg (346 lb)     ED: tolerating sildenafil w/o a/e    Prostatic s/s; voids about nightly     Lumbar radiculopathy' stable    Reviewed PmHx, RxHx, FmHx, SocHx, AllgHx and updated in chart.  Family History   Problem Relation Age of Onset    Diabetes Paternal Uncle     Hypertension Maternal Uncle     Diabetes Maternal Grandmother     Diabetes Paternal Grandmother     Hypertension Maternal Uncle     Hypertension Maternal Uncle     Hypertension Brother     Diabetes Brother     Hypertension Father     Hypertension Mother     Diabetes Mother         cancer, liver? hep c       Past Medical History:   Diagnosis Date    Asthma     Diabetes (HCC)     HTN (hypertension) 3/30/2010    Hypertension     Other and unspecified hyperlipidemia 3/30/2010    Sleep apnea     Type II or unspecified type diabetes mellitus without mention of complication, uncontrolled 3/30/2010      Social History     Socioeconomic History    Marital status: Single     Spouse name: None    Number of children: None    Years of education: None    Highest education level: None   Tobacco Use    Smoking status: Some Days     Current packs/day: 0.50

## 2025-01-23 NOTE — PROGRESS NOTES
\"Have you been to the ER, urgent care clinic since your last visit?  Hospitalized since your last visit?\"    NO    “Have you seen or consulted any other health care providers outside our system since your last visit?”    NO    “Have you had a diabetic eye exam?”    NO     Date of last diabetic eye exam: 1/24/2019          Chief Complaint   Patient presents with    3 Month Follow-Up    Diabetes     /80 (Site: Left Upper Arm, Position: Sitting, Cuff Size: Large Adult)   Pulse 80   Temp 98.2 °F (36.8 °C) (Infrared)   Resp 18   Ht 1.905 m (6' 3\")   Wt (!) 154.2 kg (339 lb 14.4 oz)   SpO2 98%   BMI 42.48 kg/m²

## 2025-01-28 RX ORDER — ASPIRIN 81 MG/1
81 TABLET, COATED ORAL DAILY
Qty: 90 TABLET | Refills: 2 | Status: SHIPPED | OUTPATIENT
Start: 2025-01-28

## 2025-02-03 RX ORDER — PEN NEEDLE, DIABETIC 31 GX5/16"
NEEDLE, DISPOSABLE MISCELLANEOUS
Qty: 100 EACH | Refills: 1 | Status: SHIPPED | OUTPATIENT
Start: 2025-02-03

## 2025-02-24 RX ORDER — LISINOPRIL AND HYDROCHLOROTHIAZIDE 10; 12.5 MG/1; MG/1
2 TABLET ORAL DAILY
Qty: 180 TABLET | Refills: 0 | Status: SHIPPED | OUTPATIENT
Start: 2025-02-24

## 2025-03-06 RX ORDER — SILDENAFIL 100 MG/1
100 TABLET, FILM COATED ORAL DAILY PRN
Qty: 20 TABLET | Refills: 5 | Status: SHIPPED | OUTPATIENT
Start: 2025-03-06

## 2025-04-28 ENCOUNTER — OFFICE VISIT (OUTPATIENT)
Facility: CLINIC | Age: 55
End: 2025-04-28
Payer: COMMERCIAL

## 2025-04-28 VITALS
TEMPERATURE: 96.6 F | HEART RATE: 73 BPM | RESPIRATION RATE: 18 BRPM | SYSTOLIC BLOOD PRESSURE: 134 MMHG | OXYGEN SATURATION: 100 % | DIASTOLIC BLOOD PRESSURE: 82 MMHG | HEIGHT: 75 IN | WEIGHT: 315 LBS | BODY MASS INDEX: 39.17 KG/M2

## 2025-04-28 DIAGNOSIS — E78.2 MIXED HYPERLIPIDEMIA: ICD-10-CM

## 2025-04-28 DIAGNOSIS — M54.16 LUMBAR RADICULOPATHY: ICD-10-CM

## 2025-04-28 DIAGNOSIS — I10 PRIMARY HYPERTENSION: ICD-10-CM

## 2025-04-28 DIAGNOSIS — N52.9 ERECTILE DYSFUNCTION, UNSPECIFIED ERECTILE DYSFUNCTION TYPE: ICD-10-CM

## 2025-04-28 DIAGNOSIS — E11.9 TYPE 2 DIABETES MELLITUS WITHOUT COMPLICATION, WITH LONG-TERM CURRENT USE OF INSULIN (HCC): Primary | ICD-10-CM

## 2025-04-28 DIAGNOSIS — F17.200 SMOKER: ICD-10-CM

## 2025-04-28 DIAGNOSIS — Z79.4 TYPE 2 DIABETES MELLITUS WITHOUT COMPLICATION, WITH LONG-TERM CURRENT USE OF INSULIN (HCC): Primary | ICD-10-CM

## 2025-04-28 DIAGNOSIS — G47.33 OBSTRUCTIVE SLEEP APNEA SYNDROME: ICD-10-CM

## 2025-04-28 LAB
GLUCOSE, POC: 134 MG/DL
HBA1C MFR BLD: 7.6 %

## 2025-04-28 PROCEDURE — 3051F HG A1C>EQUAL 7.0%<8.0%: CPT | Performed by: NURSE PRACTITIONER

## 2025-04-28 PROCEDURE — 3079F DIAST BP 80-89 MM HG: CPT | Performed by: NURSE PRACTITIONER

## 2025-04-28 PROCEDURE — 3075F SYST BP GE 130 - 139MM HG: CPT | Performed by: NURSE PRACTITIONER

## 2025-04-28 PROCEDURE — 82962 GLUCOSE BLOOD TEST: CPT | Performed by: NURSE PRACTITIONER

## 2025-04-28 PROCEDURE — 83036 HEMOGLOBIN GLYCOSYLATED A1C: CPT | Performed by: NURSE PRACTITIONER

## 2025-04-28 PROCEDURE — 99214 OFFICE O/P EST MOD 30 MIN: CPT | Performed by: NURSE PRACTITIONER

## 2025-04-28 NOTE — PROGRESS NOTES
Have you been to the ER, urgent care clinic since your last visit?  Hospitalized since your last visit?   NO    Have you seen or consulted any other health care providers outside our system since your last visit?   NO    “Have you had a diabetic eye exam?”    NO     Date of last diabetic eye exam: 1/24/2019          Chief Complaint   Patient presents with    3 Month Follow-Up    Diabetes    Hypertension    Back Pain     Pt states back pain level is a 7     /82 (BP Site: Right Upper Arm, Patient Position: Sitting, BP Cuff Size: Medium Adult)   Pulse 73   Temp (!) 96.6 °F (35.9 °C) (Oral)   Resp 18   Ht 1.905 m (6' 3\")   Wt (!) 152.5 kg (336 lb 1.6 oz)   SpO2 100%   BMI 42.01 kg/m²

## 2025-04-28 NOTE — PROGRESS NOTES
Subjective: (As above and below)     Chief Complaint   Patient presents with    3 Month Follow-Up    Diabetes    Hypertension    Back Pain     Pt states back pain level is a 7     Leandra Dumont is a 54 y.o. year old male who presents for     Hypertension ROS:  taking medications as instructed, no medication side effects noted, no TIAs, no chest pain on exertion, no dyspnea on exertion, no swelling of ankles    Diabetic Review of Systems - medication compliance: compliant all of the time, diabetic diet compliance: compliant most of the time, home glucose monitoring: is performed sporadically.   Eye exam due    Lumbar DJD: follows w ortho, surgery is likely next step      Wt Readings from Last 3 Encounters:   04/28/25 (!) 152.5 kg (336 lb 1.6 oz)   01/23/25 (!) 154.2 kg (339 lb 14.4 oz)   10/22/24 (!) 152.1 kg (335 lb 4.8 oz)     BP Readings from Last 3 Encounters:   04/28/25 134/82   01/23/25 131/80   10/22/24 130/76     AMOR: cpap adherent      Reviewed PmHx, RxHx, FmHx, SocHx, AllgHx and updated in chart.  Family History   Problem Relation Age of Onset    Diabetes Paternal Uncle     Hypertension Maternal Uncle     Diabetes Maternal Grandmother     Diabetes Paternal Grandmother     Hypertension Maternal Uncle     Hypertension Maternal Uncle     Hypertension Brother     Diabetes Brother     Hypertension Father     Hypertension Mother     Diabetes Mother         cancer, liver? hep c       Past Medical History:   Diagnosis Date    Asthma     Diabetes (HCC)     HTN (hypertension) 3/30/2010    Hypertension     Other and unspecified hyperlipidemia 3/30/2010    Sleep apnea     Type II or unspecified type diabetes mellitus without mention of complication, uncontrolled 3/30/2010      Social History     Socioeconomic History    Marital status: Single     Spouse name: None    Number of children: None    Years of education: None    Highest education level: None   Tobacco Use    Smoking status: Some Days     Current packs/day: 0.50

## 2025-04-30 LAB
ALBUMIN/CREAT UR: 18 MG/G CREAT (ref 0–29)
CREAT UR-MCNC: 131.9 MG/DL
MICROALBUMIN UR-MCNC: 23.3 UG/ML

## 2025-05-02 DIAGNOSIS — M54.16 LUMBAR RADICULOPATHY: ICD-10-CM

## 2025-05-05 RX ORDER — PREGABALIN 300 MG/1
CAPSULE ORAL
Qty: 180 CAPSULE | Refills: 1 | Status: SHIPPED | OUTPATIENT
Start: 2025-05-05 | End: 2026-05-05

## 2025-05-13 DIAGNOSIS — E11.9 TYPE 2 DIABETES MELLITUS WITHOUT COMPLICATION, WITH LONG-TERM CURRENT USE OF INSULIN (HCC): ICD-10-CM

## 2025-05-13 DIAGNOSIS — Z79.4 TYPE 2 DIABETES MELLITUS WITHOUT COMPLICATION, WITH LONG-TERM CURRENT USE OF INSULIN (HCC): ICD-10-CM

## 2025-05-17 RX ORDER — DULAGLUTIDE 1.5 MG/.5ML
INJECTION, SOLUTION SUBCUTANEOUS
Qty: 2 ML | Refills: 1 | Status: SHIPPED | OUTPATIENT
Start: 2025-05-17

## 2025-05-20 ENCOUNTER — CLINICAL DOCUMENTATION (OUTPATIENT)
Facility: CLINIC | Age: 55
End: 2025-05-20

## 2025-05-20 RX ORDER — EMPAGLIFLOZIN 10 MG/1
10 TABLET, FILM COATED ORAL DAILY
Qty: 90 TABLET | Refills: 0 | Status: SHIPPED | OUTPATIENT
Start: 2025-05-20

## 2025-05-20 NOTE — PROGRESS NOTES
Close reason: Prior Authorization not required for patient/medication  Payer: [employer] Patient's Payer Case ID: [id number]    [phone]  Note from payer: [facility] has predicted that this prior auth will not be required.

## 2025-06-02 DIAGNOSIS — E11.9 TYPE 2 DIABETES MELLITUS WITHOUT COMPLICATION, WITH LONG-TERM CURRENT USE OF INSULIN (HCC): ICD-10-CM

## 2025-06-02 DIAGNOSIS — Z79.4 TYPE 2 DIABETES MELLITUS WITHOUT COMPLICATION, WITH LONG-TERM CURRENT USE OF INSULIN (HCC): ICD-10-CM

## 2025-06-03 RX ORDER — DULAGLUTIDE 1.5 MG/.5ML
1.5 INJECTION, SOLUTION SUBCUTANEOUS WEEKLY
Qty: 2 ML | Refills: 2 | Status: SHIPPED | OUTPATIENT
Start: 2025-06-03

## 2025-06-20 ENCOUNTER — TELEPHONE (OUTPATIENT)
Facility: CLINIC | Age: 55
End: 2025-06-20

## 2025-06-23 RX ORDER — LISINOPRIL AND HYDROCHLOROTHIAZIDE 10; 12.5 MG/1; MG/1
2 TABLET ORAL DAILY
Qty: 180 TABLET | Refills: 0 | Status: SHIPPED | OUTPATIENT
Start: 2025-06-23

## 2025-07-28 ENCOUNTER — OFFICE VISIT (OUTPATIENT)
Facility: CLINIC | Age: 55
End: 2025-07-28
Payer: COMMERCIAL

## 2025-07-28 VITALS
OXYGEN SATURATION: 98 % | SYSTOLIC BLOOD PRESSURE: 137 MMHG | TEMPERATURE: 98.3 F | HEIGHT: 75 IN | RESPIRATION RATE: 18 BRPM | HEART RATE: 90 BPM | DIASTOLIC BLOOD PRESSURE: 80 MMHG | BODY MASS INDEX: 39.17 KG/M2 | WEIGHT: 315 LBS

## 2025-07-28 DIAGNOSIS — F17.200 SMOKER: ICD-10-CM

## 2025-07-28 DIAGNOSIS — Z79.4 TYPE 2 DIABETES MELLITUS WITHOUT COMPLICATION, WITH LONG-TERM CURRENT USE OF INSULIN (HCC): ICD-10-CM

## 2025-07-28 DIAGNOSIS — G47.33 OBSTRUCTIVE SLEEP APNEA SYNDROME: ICD-10-CM

## 2025-07-28 DIAGNOSIS — E66.813 OBESITY, CLASS III, BMI 40-49.9 (MORBID OBESITY) (HCC): ICD-10-CM

## 2025-07-28 DIAGNOSIS — I10 PRIMARY HYPERTENSION: Primary | ICD-10-CM

## 2025-07-28 DIAGNOSIS — H10.33 ACUTE CONJUNCTIVITIS OF BOTH EYES, UNSPECIFIED ACUTE CONJUNCTIVITIS TYPE: ICD-10-CM

## 2025-07-28 DIAGNOSIS — N52.9 ERECTILE DYSFUNCTION, UNSPECIFIED ERECTILE DYSFUNCTION TYPE: ICD-10-CM

## 2025-07-28 DIAGNOSIS — E11.9 TYPE 2 DIABETES MELLITUS WITHOUT COMPLICATION, WITH LONG-TERM CURRENT USE OF INSULIN (HCC): ICD-10-CM

## 2025-07-28 DIAGNOSIS — E78.2 MIXED HYPERLIPIDEMIA: ICD-10-CM

## 2025-07-28 DIAGNOSIS — M54.16 LUMBAR RADICULOPATHY: ICD-10-CM

## 2025-07-28 LAB
GLUCOSE, POC: 159 MG/DL
HBA1C MFR BLD: 8.5 %

## 2025-07-28 PROCEDURE — 99214 OFFICE O/P EST MOD 30 MIN: CPT | Performed by: NURSE PRACTITIONER

## 2025-07-28 PROCEDURE — 3052F HG A1C>EQUAL 8.0%<EQUAL 9.0%: CPT | Performed by: NURSE PRACTITIONER

## 2025-07-28 PROCEDURE — 83036 HEMOGLOBIN GLYCOSYLATED A1C: CPT | Performed by: NURSE PRACTITIONER

## 2025-07-28 PROCEDURE — 82962 GLUCOSE BLOOD TEST: CPT | Performed by: NURSE PRACTITIONER

## 2025-07-28 PROCEDURE — 3079F DIAST BP 80-89 MM HG: CPT | Performed by: NURSE PRACTITIONER

## 2025-07-28 PROCEDURE — 3075F SYST BP GE 130 - 139MM HG: CPT | Performed by: NURSE PRACTITIONER

## 2025-07-28 RX ORDER — CLOTRIMAZOLE AND BETAMETHASONE DIPROPIONATE 10; .64 MG/G; MG/G
CREAM TOPICAL 2 TIMES DAILY
Qty: 45 G | Refills: 0 | Status: SHIPPED | OUTPATIENT
Start: 2025-07-28

## 2025-07-28 RX ORDER — POLYMYXIN B SULFATE AND TRIMETHOPRIM 1; 10000 MG/ML; [USP'U]/ML
1 SOLUTION OPHTHALMIC EVERY 6 HOURS
Qty: 2 ML | Refills: 0 | Status: SHIPPED | OUTPATIENT
Start: 2025-07-28 | End: 2025-08-04

## 2025-07-28 NOTE — PROGRESS NOTES
Leandra Dumont is a 55 y.o. male  Have you been to the ER, urgent care clinic since your last visit?  Hospitalized since your last visit?   NO    Have you seen or consulted any other health care providers outside our system since your last visit?   NO    “Have you had a diabetic eye exam?”    NO     Date of last diabetic eye exam: 1/24/2019          Chief Complaint   Patient presents with    Diabetes    Follow-up    Hypertension     /80 (BP Site: Right Upper Arm, Patient Position: Sitting, BP Cuff Size: Large Adult)   Pulse 90   Temp 98.3 °F (36.8 °C) (Oral)   Resp 18   Ht 1.905 m (6' 3\")   Wt (!) 157.5 kg (347 lb 4.8 oz)   SpO2 98%   BMI 43.41 kg/m²     
Erectile dysfunction, unspecified erectile dysfunction type        7. Mixed hyperlipidemia        8. Obesity, Class III, BMI 40-49.9 (morbid obesity) (HCC)  Dulaglutide 3 MG/0.5ML SOAJ      9. Acute conjunctivitis of both eyes, unspecified acute conjunctivitis type  trimethoprim-polymyxin b (POLYTRIM) 25452-4.1 UNIT/ML-% ophthalmic solution                I have discussed the diagnosis with the patient and the intended plan as seen in the above orders.  The patient has received an after-visit summary and questions were answered concerning future plans.  Pt conveyed understanding of plan.      Medication Side Effects and Warnings were discussed with patient: Yes  Patient Labs were reviewed: Yes  Patient Past Records were reviewed:  Yes    PARVIN Perdue NP